# Patient Record
Sex: MALE | Race: WHITE | Employment: OTHER | URBAN - METROPOLITAN AREA
[De-identification: names, ages, dates, MRNs, and addresses within clinical notes are randomized per-mention and may not be internally consistent; named-entity substitution may affect disease eponyms.]

---

## 2017-04-04 ENCOUNTER — OFFICE VISIT (OUTPATIENT)
Dept: UROLOGY | Age: 67
End: 2017-04-04

## 2017-04-04 VITALS
WEIGHT: 295 LBS | HEIGHT: 71 IN | HEART RATE: 87 BPM | BODY MASS INDEX: 41.3 KG/M2 | SYSTOLIC BLOOD PRESSURE: 130 MMHG | DIASTOLIC BLOOD PRESSURE: 82 MMHG

## 2017-04-04 DIAGNOSIS — R35.0 FREQUENCY OF MICTURITION: ICD-10-CM

## 2017-04-04 DIAGNOSIS — R32 INCONTINENCE: Primary | ICD-10-CM

## 2017-04-04 LAB
BILIRUBIN, POC: NORMAL
BLOOD URINE, POC: NORMAL
CLARITY, POC: CLEAR
COLOR, POC: YELLOW
GLUCOSE URINE, POC: 1000
KETONES, POC: NORMAL
LEUKOCYTE EST, POC: NORMAL
NITRITE, POC: NORMAL
PH, POC: 6
PROTEIN, POC: 100
SPECIFIC GRAVITY, POC: 1.02
UROBILINOGEN, POC: 0.2

## 2017-04-04 PROCEDURE — 99214 OFFICE O/P EST MOD 30 MIN: CPT | Performed by: UROLOGY

## 2017-04-04 PROCEDURE — 81003 URINALYSIS AUTO W/O SCOPE: CPT | Performed by: UROLOGY

## 2017-04-04 ASSESSMENT — ENCOUNTER SYMPTOMS: SHORTNESS OF BREATH: 0

## 2017-04-17 ENCOUNTER — TELEPHONE (OUTPATIENT)
Dept: UROLOGY | Age: 67
End: 2017-04-17

## 2017-04-19 ENCOUNTER — TELEPHONE (OUTPATIENT)
Dept: UROLOGY | Age: 67
End: 2017-04-19

## 2017-04-19 DIAGNOSIS — N40.0 BENIGN NON-NODULAR PROSTATIC HYPERPLASIA WITHOUT LOWER URINARY TRACT SYMPTOMS: Primary | ICD-10-CM

## 2017-05-22 DIAGNOSIS — N40.0 BENIGN NON-NODULAR PROSTATIC HYPERPLASIA WITHOUT LOWER URINARY TRACT SYMPTOMS: ICD-10-CM

## 2017-05-22 LAB — PROSTATE SPECIFIC ANTIGEN: 3.01 NG/ML (ref 0–5.4)

## 2017-06-01 ENCOUNTER — OFFICE VISIT (OUTPATIENT)
Dept: UROLOGY | Age: 67
End: 2017-06-01

## 2017-06-01 VITALS
SYSTOLIC BLOOD PRESSURE: 134 MMHG | HEART RATE: 76 BPM | BODY MASS INDEX: 42 KG/M2 | WEIGHT: 300 LBS | HEIGHT: 71 IN | DIASTOLIC BLOOD PRESSURE: 82 MMHG

## 2017-06-01 DIAGNOSIS — N40.1 BPH WITH OBSTRUCTION/LOWER URINARY TRACT SYMPTOMS: Primary | ICD-10-CM

## 2017-06-01 DIAGNOSIS — N13.8 BPH WITH OBSTRUCTION/LOWER URINARY TRACT SYMPTOMS: Primary | ICD-10-CM

## 2017-06-01 PROCEDURE — 51798 US URINE CAPACITY MEASURE: CPT | Performed by: UROLOGY

## 2017-06-01 PROCEDURE — 99214 OFFICE O/P EST MOD 30 MIN: CPT | Performed by: UROLOGY

## 2017-06-01 RX ORDER — FINASTERIDE 5 MG/1
5 TABLET, FILM COATED ORAL DAILY
Qty: 90 TABLET | Refills: 3 | Status: SHIPPED | OUTPATIENT
Start: 2017-06-01

## 2017-06-01 ASSESSMENT — ENCOUNTER SYMPTOMS
ABDOMINAL PAIN: 0
SHORTNESS OF BREATH: 0

## 2019-08-12 ENCOUNTER — HOSPITAL ENCOUNTER (OUTPATIENT)
Dept: WOUND CARE | Age: 69
Discharge: HOME | End: 2019-08-12
Payer: MEDICARE

## 2019-08-12 VITALS
HEART RATE: 68 BPM | DIASTOLIC BLOOD PRESSURE: 72 MMHG | SYSTOLIC BLOOD PRESSURE: 148 MMHG | RESPIRATION RATE: 18 BRPM | TEMPERATURE: 98.3 F

## 2019-08-12 DIAGNOSIS — I83.019 VENOUS STASIS ULCER OF RIGHT LOWER LEG WITH EDEMA OF RIGHT LOWER LEG (HCC): Chronic | ICD-10-CM

## 2019-08-12 DIAGNOSIS — E66.01 MORBID OBESITY (HCC): Chronic | ICD-10-CM

## 2019-08-12 DIAGNOSIS — L97.919 VENOUS STASIS ULCER OF RIGHT LOWER LEG WITH EDEMA OF RIGHT LOWER LEG (HCC): Chronic | ICD-10-CM

## 2019-08-12 DIAGNOSIS — N18.30 STAGE 3 CHRONIC KIDNEY DISEASE (HCC): Chronic | ICD-10-CM

## 2019-08-12 DIAGNOSIS — R60.9 VENOUS STASIS ULCER OF RIGHT LOWER LEG WITH EDEMA OF RIGHT LOWER LEG (HCC): Chronic | ICD-10-CM

## 2019-08-12 DIAGNOSIS — I83.891 VENOUS STASIS ULCER OF RIGHT LOWER LEG WITH EDEMA OF RIGHT LOWER LEG (HCC): Chronic | ICD-10-CM

## 2019-08-12 PROBLEM — N18.9 CHRONIC KIDNEY DISEASE (CKD): Chronic | Status: ACTIVE | Noted: 2019-08-12

## 2019-08-12 PROBLEM — R60.0 VENOUS STASIS ULCER OF RIGHT LOWER LEG WITH EDEMA OF RIGHT LOWER LEG (HCC): Chronic | Status: ACTIVE | Noted: 2019-08-12

## 2019-08-12 PROCEDURE — 87205 SMEAR GRAM STAIN: CPT

## 2019-08-12 PROCEDURE — 87186 SC STD MICRODIL/AGAR DIL: CPT

## 2019-08-12 PROCEDURE — 87077 CULTURE AEROBIC IDENTIFY: CPT

## 2019-08-12 PROCEDURE — 87070 CULTURE OTHR SPECIMN AEROBIC: CPT

## 2019-08-12 PROCEDURE — 99213 OFFICE O/P EST LOW 20 MIN: CPT

## 2019-08-12 RX ORDER — CARVEDILOL 25 MG/1
25 TABLET ORAL 2 TIMES DAILY WITH MEALS
COMMUNITY
End: 2019-12-23

## 2019-08-12 RX ORDER — CHLORTHALIDONE 50 MG/1
50 TABLET ORAL DAILY
COMMUNITY

## 2019-08-15 LAB
GRAM STAIN RESULT: ABNORMAL
ORGANISM: ABNORMAL
ORGANISM: ABNORMAL
WOUND/ABSCESS: ABNORMAL
WOUND/ABSCESS: ABNORMAL

## 2019-08-15 RX ORDER — CLINDAMYCIN PHOSPHATE 10 MG/G
GEL TOPICAL
Qty: 30 G | Refills: 1 | Status: SHIPPED | OUTPATIENT
Start: 2019-08-15 | End: 2019-08-22

## 2019-08-19 ENCOUNTER — HOSPITAL ENCOUNTER (OUTPATIENT)
Dept: WOUND CARE | Age: 69
Discharge: HOME | End: 2019-08-19
Payer: MEDICARE

## 2019-08-19 VITALS
HEART RATE: 61 BPM | DIASTOLIC BLOOD PRESSURE: 59 MMHG | TEMPERATURE: 98.6 F | RESPIRATION RATE: 18 BRPM | SYSTOLIC BLOOD PRESSURE: 123 MMHG

## 2019-08-19 DIAGNOSIS — L97.212 NON-PRESSURE CHRONIC ULCER OF RIGHT CALF WITH FAT LAYER EXPOSED (HCC): ICD-10-CM

## 2019-08-19 PROCEDURE — 11042 DBRDMT SUBQ TIS 1ST 20SQCM/<: CPT

## 2019-09-05 ENCOUNTER — TELEPHONE (OUTPATIENT)
Dept: ADMISSION | Age: 69
End: 2019-09-05

## 2019-09-06 ENCOUNTER — TELEPHONE (OUTPATIENT)
Dept: WOUND CARE | Age: 69
End: 2019-09-06

## 2019-12-23 ENCOUNTER — HOSPITAL ENCOUNTER (OUTPATIENT)
Dept: WOUND CARE | Age: 69
Discharge: HOME | End: 2019-12-23
Payer: MEDICARE

## 2019-12-23 VITALS
TEMPERATURE: 98.8 F | SYSTOLIC BLOOD PRESSURE: 142 MMHG | DIASTOLIC BLOOD PRESSURE: 67 MMHG | RESPIRATION RATE: 18 BRPM | HEART RATE: 82 BPM

## 2019-12-23 PROCEDURE — 11042 DBRDMT SUBQ TIS 1ST 20SQCM/<: CPT

## 2019-12-23 PROCEDURE — 99213 OFFICE O/P EST LOW 20 MIN: CPT

## 2019-12-23 RX ORDER — OXYBUTYNIN CHLORIDE 15 MG/1
15 TABLET, EXTENDED RELEASE ORAL DAILY
COMMUNITY

## 2020-01-01 ENCOUNTER — HOSPITAL ENCOUNTER (OUTPATIENT)
Dept: ULTRASOUND IMAGING | Age: 70
Discharge: HOME | End: 2020-06-17
Payer: MEDICARE

## 2020-01-01 ENCOUNTER — HOSPITAL ENCOUNTER (OUTPATIENT)
Dept: CT IMAGING | Age: 70
Discharge: HOME | End: 2020-09-05
Payer: MEDICARE

## 2020-01-01 ENCOUNTER — OFFICE VISIT (OUTPATIENT)
Dept: CARDIOLOGY CLINIC | Age: 70
End: 2020-01-01
Payer: MEDICARE

## 2020-01-01 ENCOUNTER — INITIAL CONSULT (OUTPATIENT)
Dept: INTERVENTIONAL RADIOLOGY/VASCULAR | Age: 70
End: 2020-01-01
Payer: MEDICARE

## 2020-01-01 ENCOUNTER — OFFICE VISIT (OUTPATIENT)
Dept: INTERVENTIONAL RADIOLOGY/VASCULAR | Age: 70
End: 2020-01-01
Payer: MEDICARE

## 2020-01-01 ENCOUNTER — TELEPHONE (OUTPATIENT)
Dept: INTERVENTIONAL RADIOLOGY/VASCULAR | Age: 70
End: 2020-01-01

## 2020-01-01 ENCOUNTER — HOSPITAL ENCOUNTER (OUTPATIENT)
Dept: ULTRASOUND IMAGING | Age: 70
Discharge: HOME | End: 2020-11-05
Payer: MEDICARE

## 2020-01-01 VITALS
SYSTOLIC BLOOD PRESSURE: 172 MMHG | HEART RATE: 77 BPM | WEIGHT: 315 LBS | DIASTOLIC BLOOD PRESSURE: 87 MMHG | BODY MASS INDEX: 45.75 KG/M2

## 2020-01-01 VITALS
WEIGHT: 315 LBS | OXYGEN SATURATION: 96 % | SYSTOLIC BLOOD PRESSURE: 152 MMHG | BODY MASS INDEX: 44.1 KG/M2 | HEART RATE: 71 BPM | DIASTOLIC BLOOD PRESSURE: 77 MMHG | HEIGHT: 71 IN | RESPIRATION RATE: 18 BRPM

## 2020-01-01 DIAGNOSIS — E78.5 DYSLIPIDEMIA: ICD-10-CM

## 2020-01-01 DIAGNOSIS — L97.902: ICD-10-CM

## 2020-01-01 DIAGNOSIS — I10 ESSENTIAL HYPERTENSION: ICD-10-CM

## 2020-01-01 LAB
ALBUMIN SERPL-MCNC: 3.7 G/DL (ref 3.5–4.6)
ALP BLD-CCNC: 68 U/L (ref 35–104)
ALT SERPL-CCNC: 22 U/L (ref 0–41)
ANION GAP SERPL CALCULATED.3IONS-SCNC: 9 MEQ/L (ref 9–15)
AST SERPL-CCNC: 21 U/L (ref 0–40)
BILIRUB SERPL-MCNC: 0.3 MG/DL (ref 0.2–0.7)
BUN BLDV-MCNC: 25 MG/DL (ref 8–23)
CALCIUM SERPL-MCNC: 9.1 MG/DL (ref 8.5–9.9)
CHLORIDE BLD-SCNC: 99 MEQ/L (ref 95–107)
CHOLESTEROL, FASTING: 117 MG/DL (ref 0–199)
CO2: 32 MEQ/L (ref 20–31)
CREAT SERPL-MCNC: 1.32 MG/DL (ref 0.7–1.2)
GFR AFRICAN AMERICAN: >60
GFR NON-AFRICAN AMERICAN: 53.3
GLOBULIN: 2.8 G/DL (ref 2.3–3.5)
GLUCOSE BLD-MCNC: 140 MG/DL (ref 70–99)
GRAM STAIN RESULT: ABNORMAL
GRAM STAIN RESULT: ABNORMAL
HCT VFR BLD CALC: 42.1 % (ref 42–52)
HDLC SERPL-MCNC: 27 MG/DL (ref 40–59)
HEMOGLOBIN: 13.4 G/DL (ref 14–18)
LDL CHOLESTEROL CALCULATED: 55 MG/DL (ref 0–129)
MAGNESIUM: 1.7 MG/DL (ref 1.7–2.4)
MCH RBC QN AUTO: 26.7 PG (ref 27–31.3)
MCHC RBC AUTO-ENTMCNC: 31.8 % (ref 33–37)
MCV RBC AUTO: 84 FL (ref 80–100)
ORGANISM: ABNORMAL
PDW BLD-RTO: 16.3 % (ref 11.5–14.5)
PLATELET # BLD: 291 K/UL (ref 130–400)
POTASSIUM SERPL-SCNC: 4.1 MEQ/L (ref 3.4–4.9)
RBC # BLD: 5.02 M/UL (ref 4.7–6.1)
SODIUM BLD-SCNC: 140 MEQ/L (ref 135–144)
TOTAL PROTEIN: 6.5 G/DL (ref 6.3–8)
TRIGLYCERIDE, FASTING: 177 MG/DL (ref 0–150)
TSH SERPL DL<=0.05 MIU/L-ACNC: 3.42 UIU/ML (ref 0.44–3.86)
WBC # BLD: 10.1 K/UL (ref 4.8–10.8)
WOUND/ABSCESS: ABNORMAL

## 2020-01-01 PROCEDURE — 99215 OFFICE O/P EST HI 40 MIN: CPT | Performed by: NURSE PRACTITIONER

## 2020-01-01 PROCEDURE — 76642 ULTRASOUND BREAST LIMITED: CPT

## 2020-01-01 PROCEDURE — 93000 ELECTROCARDIOGRAM COMPLETE: CPT | Performed by: INTERNAL MEDICINE

## 2020-01-01 PROCEDURE — 71250 CT THORAX DX C-: CPT

## 2020-01-01 PROCEDURE — 99205 OFFICE O/P NEW HI 60 MIN: CPT | Performed by: NURSE PRACTITIONER

## 2020-01-01 PROCEDURE — 93971 EXTREMITY STUDY: CPT

## 2020-01-01 PROCEDURE — 99205 OFFICE O/P NEW HI 60 MIN: CPT | Performed by: INTERNAL MEDICINE

## 2020-01-01 PROCEDURE — 74176 CT ABD & PELVIS W/O CONTRAST: CPT

## 2020-01-01 PROCEDURE — 93971 EXTREMITY STUDY: CPT | Performed by: RADIOLOGY

## 2020-01-01 RX ORDER — ROSUVASTATIN CALCIUM 10 MG/1
10 TABLET, COATED ORAL DAILY
COMMUNITY

## 2020-01-01 RX ORDER — VALSARTAN 320 MG/1
320 TABLET ORAL DAILY
COMMUNITY

## 2020-01-01 RX ORDER — CARVEDILOL 25 MG/1
25 TABLET ORAL 2 TIMES DAILY WITH MEALS
COMMUNITY

## 2020-01-01 RX ORDER — AMLODIPINE BESYLATE 5 MG/1
5 TABLET ORAL DAILY
COMMUNITY

## 2020-01-01 RX ORDER — CEPHALEXIN 500 MG/1
500 CAPSULE ORAL 4 TIMES DAILY
Qty: 40 CAPSULE | Refills: 0 | Status: SHIPPED | OUTPATIENT
Start: 2020-01-01 | End: 2020-01-01

## 2020-01-01 ASSESSMENT — ENCOUNTER SYMPTOMS
WHEEZING: 0
CONSTIPATION: 0
EYE DISCHARGE: 0
NAUSEA: 0
COUGH: 1
EYE ITCHING: 0
RESPIRATORY NEGATIVE: 1
EYE REDNESS: 0
SINUS PRESSURE: 0
CONSTIPATION: 0
TROUBLE SWALLOWING: 0
SINUS PAIN: 0
ABDOMINAL DISTENTION: 0
SORE THROAT: 0
VISUAL CHANGE: 0
COUGH: 0
ABDOMINAL PAIN: 0
STRIDOR: 0
ABDOMINAL DISTENTION: 0
WHEEZING: 0
NAUSEA: 0
EYE DISCHARGE: 0
SHORTNESS OF BREATH: 1
BACK PAIN: 0
EYE ITCHING: 0
EYE PAIN: 0
DIARRHEA: 0
EYE PAIN: 0
BACK PAIN: 0
EYE REDNESS: 0
SORE THROAT: 0
SINUS PAIN: 0
CHEST TIGHTNESS: 0
ABDOMINAL PAIN: 0
COLOR CHANGE: 1
TROUBLE SWALLOWING: 0
BLOOD IN STOOL: 0
GASTROINTESTINAL NEGATIVE: 1
WHEEZING: 0
COLOR CHANGE: 1
SHORTNESS OF BREATH: 1
NAUSEA: 0
VOMITING: 0
SHORTNESS OF BREATH: 1
SINUS PRESSURE: 0
COUGH: 1
DIARRHEA: 0
EYES NEGATIVE: 1
VOMITING: 0
COLOR CHANGE: 1

## 2020-05-27 ENCOUNTER — TELEPHONE (OUTPATIENT)
Dept: WOUND CARE | Age: 70
End: 2020-05-27

## 2020-06-11 ENCOUNTER — OFFICE VISIT (OUTPATIENT)
Dept: ENDOCRINOLOGY | Age: 70
End: 2020-06-11
Payer: MEDICARE

## 2020-06-11 VITALS
BODY MASS INDEX: 44.1 KG/M2 | SYSTOLIC BLOOD PRESSURE: 149 MMHG | DIASTOLIC BLOOD PRESSURE: 86 MMHG | HEIGHT: 71 IN | OXYGEN SATURATION: 94 % | HEART RATE: 74 BPM | WEIGHT: 315 LBS

## 2020-06-11 PROCEDURE — 99203 OFFICE O/P NEW LOW 30 MIN: CPT | Performed by: INTERNAL MEDICINE

## 2020-06-11 RX ORDER — FLASH GLUCOSE SENSOR
KIT MISCELLANEOUS
Qty: 2 EACH | Refills: 6 | Status: SHIPPED | OUTPATIENT
Start: 2020-06-11

## 2020-06-11 RX ORDER — INSULIN GLARGINE 100 [IU]/ML
INJECTION, SOLUTION SUBCUTANEOUS
Qty: 5 PEN | Refills: 3
Start: 2020-06-11

## 2020-06-11 RX ORDER — FLASH GLUCOSE SCANNING READER
EACH MISCELLANEOUS
Qty: 1 DEVICE | Refills: 0 | Status: SHIPPED | OUTPATIENT
Start: 2020-06-11

## 2020-06-11 RX ORDER — INSULIN ASPART 100 [IU]/ML
INJECTION, SOLUTION INTRAVENOUS; SUBCUTANEOUS
Qty: 15 PEN | Refills: 3
Start: 2020-06-11

## 2020-06-11 NOTE — PROGRESS NOTES
Subjective:      Patient ID: Blanquita Starr is a 68 y.o. male. Patient   self referred here for type 2 diabetes currently is on metformin Lantus and NovoLog blood sugars have been in the 9-704 range complications diabetes include chronic kidney disease patient has been mostly being followed up at the [facility] medical facility was to take her insulin pens most current hbaic 8.9 has ckd stage 3   Diabetes   He presents for his initial diabetic visit. He has type 2 diabetes mellitus. There are no hypoglycemic associated symptoms. Associated symptoms include fatigue. Pertinent negatives for diabetes include no chest pain, no polydipsia, no polyuria, no visual change and no weight loss. Symptoms are worsening. Diabetic complications include nephropathy. Current diabetic treatment includes insulin injections. He is currently taking insulin pre-breakfast, pre-lunch, pre-dinner and at bedtime. His overall blood glucose range is >200 mg/dl.  (Glucose in 200 plus range )     Patient Active Problem List   Diagnosis    Venous stasis ulcer of right lower leg with edema of right lower leg ([address]    Uncontrolled diabetes mellitus with complication, without long-term current use of insulin (HCC)    Morbid obesity (HCC)    Chronic kidney disease (CKD)    Non-pressure 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 layer exposed ([address]     Social History     Socioeconomic History    Marital status:      Spouse name: Not on file    Number of children: Not on file    Years of education: Not on file    Highest education level: Not on file   Occupational History    Not on file   Social Needs    Financial resource strain: Not on file    Food insecurity     Worry: Not on file     Inability: Not on file    Transportation needs     Medical: Not on file     Non-medical: Not on file   Tobacco Use    Smoking status: Former Smoker    Smokeless tobacco: Never Used   Substance and Sexual Activity    Alcohol use: Not Currently    Drug use: 100 UNIT/ML SUSANNAH Morales MD

## 2020-11-03 PROBLEM — Z91.148 NONCOMPLIANCE WITH MEDICATION REGIMEN: Status: ACTIVE | Noted: 2020-01-01

## 2020-11-03 PROBLEM — I89.0 LYMPHEDEMA OF BOTH LOWER EXTREMITIES: Status: ACTIVE | Noted: 2020-01-01

## 2020-11-03 PROBLEM — Z91.14 NONCOMPLIANCE WITH MEDICATION REGIMEN: Status: ACTIVE | Noted: 2020-01-01

## 2020-11-03 NOTE — PROGRESS NOTES
Freddy Zurita, a male of 68 y.o. came to the office 11/3/2020. Chief Complaint   Patient presents with    Leg Swelling     pt referred from dr Coco Rogers for bilateral lymphedema       11/3/2020 HPI: Freddy Zurita presents to clinic for consultation at the request of his endocrinologist Dr. Brianne Berrios for evaluation of bilateral LE Lymphedema. Patient was referred back in June 2020 however states he has not presented to clinic until today due to cyclic depression. States lower extremity symptoms have worsened since June. Patient presents with symptoms of: bilateral legs with chronic edema, H/O non healing bilateral lower leg ulcers, aching, pain, burning sensation, sharp stabbing pain in right lower leg, fatigue, heaviness. Symptoms are constant and become worse at times. Causes him to have adequate nights sleep. States all pain is in right lower leg. Denies Left leg pain. Considerable right lower leg edema and severe tenderness with palpation to right lower leg. States fell a few weeks ago and now has non healing right tibial non healing wound. Chronic non healing wound to right posterior distal calf x 1 month. Chronic ulcer to left anteriomedial distal calf scabbed over and slow to heal with occurrence x 1 month. H/O wound care to RLE ulcer. NSAIDS: Tolerates pain without medication. Leg elevation: does not elevate due to causing discomfort. Stocking use and dates: Has never worn compression stockings for management of edema. PAD risk factors: HTN, HLD, obesity, former smoker, IDDM. Intermittent claudication  Shortness with any exertion. Does not follow with cardiology. Is mostly sedentary lifestyle however does attempt to incorporate some type of daily activity. H/O chronic dry cough. Diabetic neuropathy  Uncontrolled diabetes as patient admits to noncompliance with diabetic diet.       Family History   Problem Relation Age of Onset    Prostate Cancer Father     Cancer Sister        Past Surgical History:   Procedure Laterality Date    CYSTOSCOPY  04/19/2017    FLEXIBLE W/COMPLEX CYSTOMETROGRAMEMG ANAL-COMPLEX UROFLOW-US PROSTATE    SHOULDER SURGERY      WRIST FUSION          Past Medical History:   Diagnosis Date    Hyperlipidemia     Type II or unspecified type diabetes mellitus without mention of complication, not stated as uncontrolled        Social History     Socioeconomic History    Marital status:      Spouse name: Not on file    Number of children: Not on file    Years of education: Not on file    Highest education level: Not on file   Occupational History    Not on file   Social Needs    Financial resource strain: Not on file    Food insecurity     Worry: Not on file     Inability: Not on file   [employer] needs     Medical: Not on file     Non-medical: Not on file   Tobacco Use    Smoking status: Former Smoker    Smokeless tobacco: Never Used   Substance and Sexual Activity    Alcohol use: Not Currently    Drug use: Never    Sexual activity: Not Currently   Lifestyle    Physical activity     Days per week: Not on file     Minutes per session: Not on file    Stress: Not on file   Relationships    Social connections     Talks on phone: Not on file     Gets together: Not on file     Attends Jainism service: Not on file     Active member of club or organization: Not on file     Attends meetings of clubs or organizations: Not on file     Relationship status: Not on file    Intimate partner violence     Fear of current or ex partner: Not on file     Emotionally abused: Not on file     Physically abused: Not on file     Forced sexual activity: Not on file   Other Topics Concern    Not on file   Social History Narrative    Not on file       Allergies   Allergen Reactions    Atorvastatin     Losartan     Pravastatin     Simvastatin        Current Outpatient Medications on File Prior to Visit   Medication Sig Dispense Refill    insulin glargine (LANTUS SOLOSTAR) 100 UNIT/ML injection pen 60 units twice a day 5 pen 3    insulin aspart (NOVOLOG FLEXPEN) 100 UNIT/ML injection pen 40 units am 35 units lunch and 50 units 15 pen 3    Continuous Blood Gluc  (FREESTYLE MARYANN 14 DAY READER) NIRU As directed 1 Device 0    Continuous Blood Gluc Sensor (FREESTYLE MARYANN 14 DAY SENSOR) [facility] Every 2 weeks 2 each 06    oxybutynin (DITROPAN XL) 15 MG extended release tablet Take 15 mg by mouth daily      chlorthalidone (HYGROTEN) 50 MG tablet Take 50 mg by mouth daily      finasteride (PROSCAR) 5 MG tablet Take 1 tablet by mouth daily 90 tablet 3    alfuzosin (UROXATRAL) 10 MG SR tablet Take 1 tablet by mouth daily. 90 tablet 3    finasteride (PROSCAR) 5 MG tablet Take 1 tablet by mouth daily. 90 tablet 3    omeprazole (PRILOSEC) 20 MG capsule Take 20 mg by mouth daily.  rosuvastatin (CRESTOR) 20 MG tablet Take 20 mg by mouth daily.  aspirin 81 MG tablet Take 81 mg by mouth daily.  citalopram (CELEXA) 20 MG tablet Take 20 mg by mouth daily.  Aliskiren-Valsartan (VALTURNA) 300-320 MG TABS Take  by mouth.  amlodipine-benazepril (LOTREL) 5-20 MG per capsule Take 1 capsule by mouth daily. No current facility-administered medications on file prior to visit. Review of Systems   Constitutional: Negative for appetite change, chills, fatigue and fever. HENT: Negative for congestion, sinus pressure, sinus pain, sore throat and trouble swallowing. Eyes: Negative for pain, discharge, redness and itching. Respiratory: Positive for cough (chronic dry cough) and shortness of breath (chronic with exertion. ). Negative for wheezing. Cardiovascular: Positive for leg swelling (chronic bilateral lower leg edema wiht RLE .> LLE). Negative for chest pain and palpitations. Intermittent lower extremity claudication   Gastrointestinal: Negative for abdominal distention, abdominal pain, constipation, diarrhea, nausea and vomiting. Endocrine: Negative. Genitourinary: Negative for dysuria, frequency, hematuria and urgency. Musculoskeletal: Negative for back pain, neck pain and neck stiffness. Skin: Positive for color change (skin darkening to bilateral lower legs. ) and wound (chronic lower legs with RT > Lt). Neurological: Positive for numbness (feet and lower legs). Negative for dizziness, light-headedness and headaches. Hematological: Negative. Psychiatric/Behavioral: Negative. OBJECTIVE:  BP (!) 172/87   Pulse 77   Wt (!) 328 lb (148.8 kg)   BMI 45.75 kg/m²     Physical Exam  Constitutional:       General: He is not in acute distress. Appearance: Normal appearance. He is morbidly obese. He is not ill-appearing. HENT:      Head: Normocephalic and atraumatic. Nose: Nose normal. No congestion. Neck:      Musculoskeletal: Normal range of motion. Cardiovascular:      Rate and Rhythm: Normal rate and regular rhythm. Pulses:           Dorsalis pedis pulses are detected w/ Doppler on the right side and detected w/ Doppler on the left side. Posterior tibial pulses are detected w/ Doppler on the right side and detected w/ Doppler on the left side. Heart sounds: Normal heart sounds. Comments: Faint Doppled pulses to right lower leg. Left leg Doppler pulses more audible than RLE  Pulmonary:      Effort: Pulmonary effort is normal.      Breath sounds: Examination of the right-lower field reveals decreased breath sounds. Examination of the left-lower field reveals decreased breath sounds. Decreased breath sounds present. Abdominal:      General: Bowel sounds are normal. There is no distension. Musculoskeletal:         General: Tenderness (Tenderness to right lower leg lesion areas) and signs of injury (Right lateral lower leg now with ulcer formation) present. No swelling. Right lower le+ Pitting Edema present. Left lower le+ Pitting Edema present.    Skin: cultures of RLE ulcers today in office and future vascular testing, and education of disease processes. Thank You Dr. Pipe Bryson for referral of your patient to our practice for care.      WENDY Murphy - CNP

## 2020-11-12 PROBLEM — R06.02 SHORTNESS OF BREATH: Status: ACTIVE | Noted: 2020-01-01

## 2020-11-12 PROBLEM — E78.5 DYSLIPIDEMIA: Status: ACTIVE | Noted: 2020-01-01

## 2020-11-12 PROBLEM — R09.89 BILATERAL CAROTID BRUITS: Status: ACTIVE | Noted: 2020-01-01

## 2020-11-12 PROBLEM — I70.213 ATHEROSCLEROSIS OF NATIVE ARTERY OF BOTH LOWER EXTREMITIES WITH INTERMITTENT CLAUDICATION (HCC): Status: ACTIVE | Noted: 2020-01-01

## 2020-11-12 PROBLEM — I87.2 EDEMA OF RIGHT LOWER LEG DUE TO PERIPHERAL VENOUS INSUFFICIENCY: Status: ACTIVE | Noted: 2020-01-01

## 2020-11-12 PROBLEM — R60.0 EDEMA OF RIGHT LOWER LEG DUE TO PERIPHERAL VENOUS INSUFFICIENCY: Status: ACTIVE | Noted: 2020-01-01

## 2020-11-12 PROBLEM — I70.213 ATHEROSCLEROSIS OF NATIVE ARTERY OF BOTH LOWER EXTREMITIES WITH INTERMITTENT CLAUDICATION (HCC): Status: RESOLVED | Noted: 2020-01-01 | Resolved: 2020-01-01

## 2020-11-12 NOTE — PROGRESS NOTES
Td Quiroz, a male of 68 y.o. came to the office 11/12/2020. No chief complaint on file. SUBJECTIVE:     11/12/2020: Td Quiroz returns for LE arterial US and to complete RLE venous scan as this was not completed previously due to open draining infected wounds. No change to LE symtpoms. Has 5 days left of 10 day ATB  RLE wounds improving and healing with less noted edema to lower leg and no pain. No erythema. He is going to [facility] wound center in [facility] with second follow up appt next week. States he also sees a podiatrist at [facility] he thinks for possibly same treatment. He wants to stay with [facility] care for insurance purposes however he is not always pleased with the care provided so he also goes for health care outside of [facility] on occasion. This causes a breach in his continuity of care. Had consultation with [facility] Dr. Margo Morales with cardiac diagnostic testing ordered. Follows with [facility] Dr. Josias Ballard for IDDM. Has no follow up appt. 11/3/2020 HPI: Td Quiroz presents to clinic for consultation at the request of his endocrinologist Dr. Josias Ballard for evaluation of bilateral LE Lymphedema. Patient was referred back in June 2020 however states he has not presented to clinic until today due to cyclic depression. States lower extremity symptoms have worsened since June. Patient presents with symptoms of: bilateral legs with chronic edema, H/O non healing bilateral lower leg ulcers, aching, pain, burning sensation, sharp stabbing pain in right lower leg, fatigue, heaviness. Symptoms are constant and become worse at times. Causes him to have adequate nights sleep. States all pain is in right lower leg. Denies Left leg pain. Considerable right lower leg edema and severe tenderness with palpation to right lower leg. States fell a few weeks ago and now has non healing right tibial non healing wound. Chronic non healing wound to right posterior distal calf x 1 month.  Chronic ulcer to left anteriomedial distal calf scabbed over and slow to heal with occurrence x 1 month. H/O wound care to RLE ulcer. NSAIDS: Tolerates pain without medication. Leg elevation: does not elevate due to causing discomfort. Stocking use and dates: Has never worn compression stockings for management of edema. PAD risk factors: HTN, HLD, obesity, former smoker, IDDM. Intermittent claudication  Shortness with any exertion. Does not follow with cardiology. Is mostly sedentary lifestyle however does attempt to incorporate some type of daily activity. H/O chronic dry cough. Diabetic neuropathy  Uncontrolled diabetes as patient admits to noncompliance with diabetic diet.       Family History   Problem Relation Age of Onset    Prostate Cancer Father     Cancer Sister        Past Surgical History:   Procedure Laterality Date    CYSTOSCOPY  04/19/2017    FLEXIBLE W/COMPLEX CYSTOMETROGRAMEMG ANAL-COMPLEX UROFLOW-US PROSTATE    SHOULDER SURGERY      WRIST FUSION          Past Medical History:   Diagnosis Date    Hyperlipidemia     Type II or unspecified type diabetes mellitus without mention of complication, not stated as uncontrolled        Social History     Socioeconomic History    Marital status:      Spouse name: Not on file    Number of children: Not on file    Years of education: Not on file    Highest education level: Not on file   Occupational History    Not on file   Social Needs    Financial resource strain: Not on file    Food insecurity     Worry: Not on file     Inability: Not on file   [employer] needs     Medical: Not on file     Non-medical: Not on file   Tobacco Use    Smoking status: Former Smoker    Smokeless tobacco: Never Used   Substance and Sexual Activity    Alcohol use: Not Currently    Drug use: Never    Sexual activity: Not Currently   Lifestyle    Physical activity     Days per week: Not on file     Minutes per session: Not on file    Stress: Not on file   Relationships    Social connections     Talks on phone: Not on file     Gets together: Not on file     Attends Amish service: Not on file     Active member of club or organization: Not on file     Attends meetings of clubs or organizations: Not on file     Relationship status: Not on file    Intimate partner violence     Fear of current or ex partner: Not on file     Emotionally abused: Not on file     Physically abused: Not on file     Forced sexual activity: Not on file   Other Topics Concern    Not on file   Social History Narrative    Not on file       Allergies   Allergen Reactions    Atorvastatin     Losartan     Pravastatin     Simvastatin        Current Outpatient Medications on File Prior to Visit   Medication Sig Dispense Refill    valsartan (DIOVAN) 320 MG tablet Take 320 mg by mouth daily      amLODIPine (NORVASC) 5 MG tablet Take 5 mg by mouth daily      rosuvastatin (CRESTOR) 10 MG tablet Take 10 mg by mouth daily      Saw Palmetto, Serenoa repens, (SAW PALMETTO PO) Take by mouth      carvedilol (COREG) 25 MG tablet Take 25 mg by mouth 2 times daily (with meals)      vitamin D 25 MCG (1000 UT) CAPS Take by mouth      cephALEXin (KEFLEX) 500 MG capsule Take 1 capsule by mouth 4 times daily for 10 days 40 capsule 0    insulin glargine (LANTUS SOLOSTAR) 100 UNIT/ML injection pen 60 units twice a day 5 pen 3    insulin aspart (NOVOLOG FLEXPEN) 100 UNIT/ML injection pen 40 units am 35 units lunch and 50 units (Patient taking differently: 50 units AM, 40 units lunch, 50 units dinner) 15 pen 3    Continuous Blood Gluc  (FREESTYLE MARYANN 14 DAY READER) NIRU As directed 1 Device 0    Continuous Blood Gluc Sensor (FREESTYLE MARYANN 14 DAY SENSOR) [facility] Every 2 weeks 2 each 06    oxybutynin (DITROPAN XL) 15 MG extended release tablet Take 15 mg by mouth daily      chlorthalidone (HYGROTEN) 50 MG tablet Take 50 mg by mouth daily      finasteride (PROSCAR) 5 MG tablet Take 1 tablet by mouth daily 90 tablet 3    finasteride (PROSCAR) 5 MG tablet Take 1 tablet by mouth daily. 90 tablet 3    omeprazole (PRILOSEC) 20 MG capsule Take 20 mg by mouth daily.  aspirin 81 MG tablet Take 81 mg by mouth daily. No current facility-administered medications on file prior to visit. Review of Systems   Constitutional: Negative for appetite change, chills, fatigue and fever. HENT: Negative for congestion, sinus pressure, sinus pain, sore throat and trouble swallowing. Eyes: Negative for pain, discharge, redness and itching. Respiratory: Positive for cough (chronic dry cough) and shortness of breath (chronic with exertion. ). Negative for wheezing. Cardiovascular: Positive for leg swelling (chronic bilateral lower leg edema wiht RLE .> LLE). Negative for chest pain and palpitations. Gastrointestinal: Negative for abdominal distention, abdominal pain, constipation, diarrhea, nausea and vomiting. Endocrine: Negative. Genitourinary: Negative for dysuria, frequency, hematuria and urgency. Musculoskeletal: Negative for back pain, neck pain and neck stiffness. Skin: Positive for color change (skin darkening to bilateral lower legs. ) and wound (chronic lower legs with RT > Lt). Neurological: Positive for numbness (feet and lower legs). Negative for dizziness, light-headedness and headaches. Hematological: Negative. Psychiatric/Behavioral: Negative. OBJECTIVE:  There were no vitals taken for this visit. Physical Exam  Constitutional:       General: He is not in acute distress. Appearance: Normal appearance. He is morbidly obese. He is not ill-appearing. HENT:      Head: Normocephalic and atraumatic. Nose: Nose normal. No congestion. Neck:      Musculoskeletal: Normal range of motion. Cardiovascular:      Rate and Rhythm: Normal rate and regular rhythm.       Pulses:           Dorsalis pedis pulses are detected w/ Doppler on the right side and detected w/ Doppler on the left side. Posterior tibial pulses are detected w/ Doppler on the right side and detected w/ Doppler on the left side. Heart sounds: Normal heart sounds. Comments: Faint Doppled pulses to right lower leg. Left leg Doppler pulses more audible than RLE  Pulmonary:      Effort: Pulmonary effort is normal.      Breath sounds: Examination of the right-lower field reveals decreased breath sounds. Examination of the left-lower field reveals decreased breath sounds. Decreased breath sounds present. Abdominal:      General: Bowel sounds are normal. There is no distension. Musculoskeletal:         General: No swelling, tenderness or signs of injury (Right lateral lower leg now with ulcer formation). Right lower le+ Pitting Edema present. Left lower le+ Pitting Edema present. Skin:     Capillary Refill: Capillary refill takes 2 to 3 seconds. Coloration: Skin is not jaundiced or pale. Findings: Lesion (Chronic nonhealing ulcer to right posterior distal calf and right lateral distal calf both draining scan amount of serous fluid.  ) present. No bruising, erythema or rash. Neurological:      Mental Status: He is alert and oriented to person, place, and time. Psychiatric:         Mood and Affect: Mood normal.         Behavior: Behavior normal. Behavior is cooperative. ASSESSMENT AND PLAN:    Assessment:  1. Chronic bilateral lower leg edema, aching, fatigue, heaviness, non healing ulcers. LE Venous US done in office today with insufficiency to right GSV. 2. Morbid obesity  3. Symptoms consistent with Intermittent claudication with PAD risk factors: Arterial US done in office today with trace to mild LE arterial disease to lower legs. 4. Bilateral LE Stage 3 Lymphedema  5. Calf pain. Duplex negative  6. Infected RLE ulcers: placed on ATB and improving/healing   7. Diabetic peripheral Neuropathy.   8. Short of breath with minimal exertion  9. Noncompliance with diabetic regime. Plan:   1. Elevate daily as needed  2.   daily activity as tolerated  3. Rx thigh high compression stockings 20-30 mmhg wear daily during day and off Qhs. Wear as tolerated. Educated in detail venous insufficiency and lymphedema and purpose of compression. 8-week trial of wearing compression to lower extremities for initial treatment of above. Return in 8 weeks to evaluate effectiveness. At that time will discuss further treatment for lower extremity lymphedema and possible venous procedures. Chart reviewed of pertinent information. See media section for lymphedema measurements. 4.  Continue follow[facility] with [facility] cardiology. 5.  [facility]      More than 50% of 40-minute face-to-face office visit done in counseling, coordination of care for a stat RLE duplex today and cultures of RLE ulcers today in office and future vascular testing, and education of disease processes.        Sanket Liang, APRN - CNP

## 2020-11-12 NOTE — PROGRESS NOTES
NEW PATIENT        Patient: Ender Escudero  YOB: 1950  MRN: [id number]    Chief Complaint: ALEXANDER Claudication. DM   Chief Complaint   Patient presents with    Establish Cardiologist     referral Pineda Nguyen CNP    Claudication     BLE    Shortness of Breath     with any exertion    Edema     BLE pain and swelling       CV Data:    Subjective/HPI couple years dealing with RLE posterior wound from an injury when he bumped into a table. Legs hurt with walking. ALEXANDER is getting worse. He has ÓSCAR but does not use CPAP    Long standing DM with A1C >8    Nonsmoker- stopped. No etoh  Retired - outside construction.       EKG: SR 79    Past Medical History:   Diagnosis Date    Hyperlipidemia     Type II or unspecified type diabetes mellitus without mention of complication, not stated as uncontrolled        Past Surgical History:   Procedure Laterality Date    CYSTOSCOPY  04/19/2017    FLEXIBLE W/COMPLEX CYSTOMETROGRAMEMG ANAL-COMPLEX UROFLOW-US PROSTATE    SHOULDER SURGERY      WRIST FUSION         Family History   Problem Relation Age of Onset    Prostate Cancer Father     Cancer Sister        Social History     Socioeconomic History    Marital status:      Spouse name: None    Number of children: None    Years of education: None    Highest education level: None   Occupational History    None   Social Needs    Financial resource strain: None    Food insecurity     Worry: None     Inability: None    Transportation needs     Medical: None     Non-medical: None   Tobacco Use    Smoking status: Former Smoker    Smokeless tobacco: Never Used   Substance and Sexual Activity    Alcohol use: Not Currently    Drug use: Never    Sexual activity: Not Currently   Lifestyle    Physical activity     Days per week: None     Minutes per session: None    Stress: None   Relationships    Social connections     Talks on phone: None     Gets together: None     Attends [employer] service: None Active member of club or organization: None     Attends meetings of clubs or organizations: None     Relationship status: None    Intimate partner violence     Fear of current or ex partner: None     Emotionally abused: None     Physically abused: None     Forced sexual activity: None   Other Topics Concern    None   Social History Narrative    None       Allergies   Allergen Reactions    Atorvastatin     Losartan     Pravastatin     Simvastatin        Current Outpatient Medications   Medication Sig Dispense Refill    valsartan (DIOVAN) 320 MG tablet Take 320 mg by mouth daily      amLODIPine (NORVASC) 5 MG tablet Take 5 mg by mouth daily      rosuvastatin (CRESTOR) 10 MG tablet Take 10 mg by mouth daily      Saw Palmetto, Serenoa repens, (SAW PALMETTO PO) Take by mouth      carvedilol (COREG) 25 MG tablet Take 25 mg by mouth 2 times daily (with meals)      vitamin D 25 MCG (1000 UT) CAPS Take by mouth      cephALEXin (KEFLEX) 500 MG capsule Take 1 capsule by mouth 4 times daily for 10 days 40 capsule 0    insulin glargine (LANTUS SOLOSTAR) 100 UNIT/ML injection pen 60 units twice a day 5 pen 3    insulin aspart (NOVOLOG FLEXPEN) 100 UNIT/ML injection pen 40 units am 35 units lunch and 50 units (Patient taking differently: 50 units AM, 40 units lunch, 50 units dinner) 15 pen 3    Continuous Blood Gluc  (FREESTYLE MARYANN 14 DAY READER) NIRU As directed 1 Device 0    Continuous Blood Gluc Sensor (FREESTYLE MRAYANN 14 DAY SENSOR) [facility] [place] Every 2 weeks 2 each 06    oxybutynin (DITROPAN XL) 15 MG extended release tablet Take 15 mg by mouth daily      chlorthalidone (HYGROTEN) 50 MG tablet Take 50 mg by mouth daily      finasteride (PROSCAR) 5 MG tablet Take 1 tablet by mouth daily 90 tablet 3    finasteride (PROSCAR) 5 MG tablet Take 1 tablet by mouth daily. 90 tablet 3    omeprazole (PRILOSEC) 20 MG capsule Take 20 mg by mouth daily.  aspirin 81 MG tablet Take 81 mg by mouth daily. MPV  Lipids:No results found for: CHOL  No results found for: TRIG  No results found for: HDL  No results found for: LDLCHOLESTEROL, LDLCALC  No results found for: LABVLDL, VLDL  No results found for: CHOLHDLRATIO  CMP:  No results found for: NA, K, CL, CO2, BUN, CREATININE, GFRAA, AGRATIO, LABGLOM, GLUCOSE, PROT, LABALBU, CALCIUM, BILITOT, ALKPHOS, AST, ALT  BMP:  No results found for: NA, K, CL, CO2, BUN, LABALBU, CREATININE, CALCIUM, GFRAA, LABGLOM, GLUCOSE  Magnesium:  No results found for: MG  TSH:No results found for: TSHFT4, TSH          Patient Active Problem List   Diagnosis    Venous stasis ulcer of right lower leg with edema of right lower leg (HCC)    Uncontrolled diabetes mellitus with complication, without long-term current use of insulin [facility]    Morbid obesity ([facility]    Chronic kidney disease (CKD)    Non-pressure 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 layer exposed ([facility]    Lymphedema of both lower extremities    Noncompliance with medication regimen    Atherosclerosis of native artery of both lower extremities with intermittent claudication (HCC)    Shortness of breath    Bilateral carotid bruits    Dyslipidemia       Medications Discontinued During This Encounter   Medication Reason    alfuzosin (UROXATRAL) 10 MG SR tablet LIST CLEANUP    Aliskiren-Valsartan (VALTURNA) 300-320 MG TABS DISCONTINUED BY ANOTHER CLINICIAN    amlodipine-benazepril (LOTREL) 5-20 MG per capsule DISCONTINUED BY ANOTHER CLINICIAN    rosuvastatin (CRESTOR) 20 MG tablet DOSE ADJUSTMENT    citalopram (CELEXA) 20 MG tablet LIST CLEANUP       Modified Medications    No medications on file       No orders of the defined types were placed in this encounter. Assessment/Plan:    1. Atherosclerosis of native artery of both lower extremities with intermittent claudication (HCC)     - VL Arterial PVR Lower w Exercise; Future    2.  Shortness of breath     - EKG 12 Lead  - NM MYOCARDIAL SPECT REST EXERCISE OR RX; Future  - ECHO Complete 2D W Doppler W Color; Future    3. Bilateral carotid bruits     - US CAROTID ARTERY BILATERAL; Future    4. Dyslipidemia     - Lipid, Fasting; Future    5. Essential hypertension     - CBC; Future  - Magnesium; Future  - TSH without Reflex; Future  - Comprehensive Metabolic Panel; Future     6. ÓSCAR- does not use CPAP. Counseling:  Heart Healthy Lifestyle, Improve BMI, Low Salt Diet, Take Precautions to Prevent Falls and Walk Daily    Return for AFTER TESTS.     Electronically signed by Carina Moy MD on 11/12/2020 at 1:19 PM

## 2021-01-01 ENCOUNTER — APPOINTMENT (OUTPATIENT)
Dept: INTERVENTIONAL RADIOLOGY/VASCULAR | Age: 71
DRG: 870 | End: 2021-01-01
Payer: OTHER GOVERNMENT

## 2021-01-01 ENCOUNTER — APPOINTMENT (OUTPATIENT)
Dept: GENERAL RADIOLOGY | Age: 71
DRG: 870 | End: 2021-01-01
Payer: OTHER GOVERNMENT

## 2021-01-01 ENCOUNTER — APPOINTMENT (OUTPATIENT)
Dept: ULTRASOUND IMAGING | Age: 71
DRG: 870 | End: 2021-01-01
Payer: OTHER GOVERNMENT

## 2021-01-01 ENCOUNTER — APPOINTMENT (OUTPATIENT)
Dept: CT IMAGING | Age: 71
DRG: 870 | End: 2021-01-01
Payer: OTHER GOVERNMENT

## 2021-01-01 ENCOUNTER — HOSPITAL ENCOUNTER (INPATIENT)
Age: 71
LOS: 12 days | DRG: 870 | End: 2021-06-14
Attending: EMERGENCY MEDICINE
Payer: OTHER GOVERNMENT

## 2021-01-01 VITALS
WEIGHT: 315 LBS | SYSTOLIC BLOOD PRESSURE: 115 MMHG | BODY MASS INDEX: 41.75 KG/M2 | HEART RATE: 65 BPM | OXYGEN SATURATION: 89 % | DIASTOLIC BLOOD PRESSURE: 56 MMHG | TEMPERATURE: 98.9 F | HEIGHT: 73 IN | RESPIRATION RATE: 18 BRPM

## 2021-01-01 DIAGNOSIS — Z86.69 HISTORY OF SLEEP APNEA: ICD-10-CM

## 2021-01-01 DIAGNOSIS — R06.00 DYSPNEA, UNSPECIFIED TYPE: ICD-10-CM

## 2021-01-01 DIAGNOSIS — J96.02 ACUTE RESPIRATORY FAILURE WITH HYPOXIA AND HYPERCAPNIA (HCC): Primary | ICD-10-CM

## 2021-01-01 DIAGNOSIS — E16.2 HYPOGLYCEMIA: ICD-10-CM

## 2021-01-01 DIAGNOSIS — E66.01 MORBID OBESITY (HCC): ICD-10-CM

## 2021-01-01 DIAGNOSIS — I44.7 LEFT BUNDLE BRANCH BLOCK: ICD-10-CM

## 2021-01-01 DIAGNOSIS — I51.7 CARDIOMEGALY: ICD-10-CM

## 2021-01-01 DIAGNOSIS — J18.9 PNEUMONIA DUE TO ORGANISM: ICD-10-CM

## 2021-01-01 DIAGNOSIS — J96.01 ACUTE RESPIRATORY FAILURE WITH HYPOXIA AND HYPERCAPNIA (HCC): Primary | ICD-10-CM

## 2021-01-01 LAB
ALBUMIN SERPL-MCNC: 4 G/DL (ref 3.5–4.6)
ALP BLD-CCNC: 77 U/L (ref 35–104)
ALT SERPL-CCNC: 27 U/L (ref 0–41)
ANION GAP SERPL CALCULATED.3IONS-SCNC: 11 MEQ/L (ref 9–15)
ANION GAP SERPL CALCULATED.3IONS-SCNC: 12 MEQ/L (ref 9–15)
ANION GAP SERPL CALCULATED.3IONS-SCNC: 12 MEQ/L (ref 9–15)
ANION GAP SERPL CALCULATED.3IONS-SCNC: 14 MEQ/L (ref 9–15)
ANION GAP SERPL CALCULATED.3IONS-SCNC: 15 MEQ/L (ref 9–15)
ANION GAP SERPL CALCULATED.3IONS-SCNC: 16 MEQ/L (ref 9–15)
ANION GAP SERPL CALCULATED.3IONS-SCNC: 17 MEQ/L (ref 9–15)
ANION GAP SERPL CALCULATED.3IONS-SCNC: 9 MEQ/L (ref 9–15)
ANISOCYTOSIS: ABNORMAL
ANTI-XA UNFRAC HEPARIN: 0.58 IU/ML
ANTI-XA UNFRAC HEPARIN: 0.63 IU/ML
ANTI-XA UNFRAC HEPARIN: 0.76 IU/ML
APTT: 30 SEC (ref 24.4–36.8)
AST SERPL-CCNC: 24 U/L (ref 0–40)
BACTERIA: ABNORMAL /HPF
BACTERIA: NEGATIVE /HPF
BANDED NEUTROPHILS RELATIVE PERCENT: 2 %
BASE EXCESS ARTERIAL: 10 (ref -3–3)
BASE EXCESS ARTERIAL: 11 (ref -3–3)
BASE EXCESS ARTERIAL: 12 (ref -3–3)
BASE EXCESS ARTERIAL: 13 (ref -3–3)
BASE EXCESS ARTERIAL: 13 (ref -3–3)
BASE EXCESS ARTERIAL: 14 (ref -3–3)
BASE EXCESS ARTERIAL: 9 (ref -3–3)
BASE EXCESS ARTERIAL: 9 (ref -3–3)
BASOPHILS ABSOLUTE: 0 K/UL (ref 0–0.2)
BASOPHILS ABSOLUTE: 0.1 K/UL (ref 0–0.2)
BASOPHILS ABSOLUTE: 0.2 K/UL (ref 0–0.2)
BASOPHILS RELATIVE PERCENT: 0.5 %
BASOPHILS RELATIVE PERCENT: 0.6 %
BASOPHILS RELATIVE PERCENT: 0.6 %
BASOPHILS RELATIVE PERCENT: 0.7 %
BASOPHILS RELATIVE PERCENT: 0.7 %
BASOPHILS RELATIVE PERCENT: 0.8 %
BASOPHILS RELATIVE PERCENT: 1 %
BASOPHILS RELATIVE PERCENT: 1 %
BILIRUB SERPL-MCNC: 0.3 MG/DL (ref 0.2–0.7)
BILIRUBIN URINE: NEGATIVE
BILIRUBIN URINE: NEGATIVE
BLOOD CULTURE, ROUTINE: NORMAL
BLOOD, URINE: ABNORMAL
BLOOD, URINE: ABNORMAL
BUN BLDV-MCNC: 105 MG/DL (ref 8–23)
BUN BLDV-MCNC: 116 MG/DL (ref 8–23)
BUN BLDV-MCNC: 138 MG/DL (ref 8–23)
BUN BLDV-MCNC: 159 MG/DL (ref 8–23)
BUN BLDV-MCNC: 169 MG/DL (ref 8–23)
BUN BLDV-MCNC: 43 MG/DL (ref 8–23)
BUN BLDV-MCNC: 49 MG/DL (ref 8–23)
BUN BLDV-MCNC: 53 MG/DL (ref 8–23)
BUN BLDV-MCNC: 61 MG/DL (ref 8–23)
BUN BLDV-MCNC: 62 MG/DL (ref 8–23)
BUN BLDV-MCNC: 69 MG/DL (ref 8–23)
BUN BLDV-MCNC: 75 MG/DL (ref 8–23)
BUN BLDV-MCNC: 91 MG/DL (ref 8–23)
CALCIUM IONIZED: 1.01 MMOL/L (ref 1.12–1.32)
CALCIUM IONIZED: 1.02 MMOL/L (ref 1.12–1.32)
CALCIUM IONIZED: 1.04 MMOL/L (ref 1.12–1.32)
CALCIUM IONIZED: 1.05 MMOL/L (ref 1.12–1.32)
CALCIUM IONIZED: 1.06 MMOL/L (ref 1.12–1.32)
CALCIUM IONIZED: 1.08 MMOL/L (ref 1.12–1.32)
CALCIUM IONIZED: 1.11 MMOL/L (ref 1.12–1.32)
CALCIUM IONIZED: 1.13 MMOL/L (ref 1.12–1.32)
CALCIUM IONIZED: 1.21 MMOL/L (ref 1.12–1.32)
CALCIUM SERPL-MCNC: 7.7 MG/DL (ref 8.5–9.9)
CALCIUM SERPL-MCNC: 8.1 MG/DL (ref 8.5–9.9)
CALCIUM SERPL-MCNC: 8.3 MG/DL (ref 8.5–9.9)
CALCIUM SERPL-MCNC: 8.5 MG/DL (ref 8.5–9.9)
CALCIUM SERPL-MCNC: 8.6 MG/DL (ref 8.5–9.9)
CALCIUM SERPL-MCNC: 8.7 MG/DL (ref 8.5–9.9)
CALCIUM SERPL-MCNC: 8.9 MG/DL (ref 8.5–9.9)
CALCIUM SERPL-MCNC: 9 MG/DL (ref 8.5–9.9)
CALCIUM SERPL-MCNC: 9 MG/DL (ref 8.5–9.9)
CALCIUM SERPL-MCNC: 9.2 MG/DL (ref 8.5–9.9)
CALCIUM SERPL-MCNC: 9.3 MG/DL (ref 8.5–9.9)
CALCIUM SERPL-MCNC: 9.5 MG/DL (ref 8.5–9.9)
CALCIUM SERPL-MCNC: 9.6 MG/DL (ref 8.5–9.9)
CHLORIDE BLD-SCNC: 100 MEQ/L (ref 95–107)
CHLORIDE BLD-SCNC: 79 MEQ/L (ref 95–107)
CHLORIDE BLD-SCNC: 79 MEQ/L (ref 95–107)
CHLORIDE BLD-SCNC: 83 MEQ/L (ref 95–107)
CHLORIDE BLD-SCNC: 84 MEQ/L (ref 95–107)
CHLORIDE BLD-SCNC: 84 MEQ/L (ref 95–107)
CHLORIDE BLD-SCNC: 86 MEQ/L (ref 95–107)
CHLORIDE BLD-SCNC: 86 MEQ/L (ref 95–107)
CHLORIDE BLD-SCNC: 89 MEQ/L (ref 95–107)
CHLORIDE BLD-SCNC: 90 MEQ/L (ref 95–107)
CHLORIDE BLD-SCNC: 95 MEQ/L (ref 95–107)
CHLORIDE BLD-SCNC: 96 MEQ/L (ref 95–107)
CHLORIDE BLD-SCNC: 98 MEQ/L (ref 95–107)
CHOLESTEROL, TOTAL: 74 MG/DL (ref 0–199)
CLARITY: ABNORMAL
CLARITY: ABNORMAL
CO2: 32 MEQ/L (ref 20–31)
CO2: 33 MEQ/L (ref 20–31)
CO2: 34 MEQ/L (ref 20–31)
CO2: 35 MEQ/L (ref 20–31)
CO2: 37 MEQ/L (ref 20–31)
CO2: 38 MEQ/L (ref 20–31)
COLOR: ABNORMAL
COLOR: ABNORMAL
CREAT SERPL-MCNC: 1.82 MG/DL (ref 0.7–1.2)
CREAT SERPL-MCNC: 2.24 MG/DL (ref 0.7–1.2)
CREAT SERPL-MCNC: 2.47 MG/DL (ref 0.7–1.2)
CREAT SERPL-MCNC: 3.27 MG/DL (ref 0.7–1.2)
CREAT SERPL-MCNC: 3.27 MG/DL (ref 0.7–1.2)
CREAT SERPL-MCNC: 3.28 MG/DL (ref 0.7–1.2)
CREAT SERPL-MCNC: 3.31 MG/DL (ref 0.7–1.2)
CREAT SERPL-MCNC: 3.33 MG/DL (ref 0.7–1.2)
CREAT SERPL-MCNC: 3.39 MG/DL (ref 0.7–1.2)
CREAT SERPL-MCNC: 3.4 MG/DL (ref 0.7–1.2)
CREAT SERPL-MCNC: 4.33 MG/DL (ref 0.7–1.2)
CREAT SERPL-MCNC: 4.84 MG/DL (ref 0.7–1.2)
CREAT SERPL-MCNC: 4.97 MG/DL (ref 0.7–1.2)
CREATININE URINE: 244.8 MG/DL
CULTURE, BLOOD 2: NORMAL
CULTURE, RESPIRATORY: ABNORMAL
CULTURE, RESPIRATORY: NORMAL
D DIMER: 2.29 MG/L FEU (ref 0–0.5)
EKG ATRIAL RATE: 46 BPM
EKG ATRIAL RATE: 78 BPM
EKG ATRIAL RATE: 78 BPM
EKG P AXIS: 49 DEGREES
EKG P AXIS: 51 DEGREES
EKG P AXIS: 58 DEGREES
EKG P-R INTERVAL: 190 MS
EKG P-R INTERVAL: 194 MS
EKG P-R INTERVAL: 204 MS
EKG Q-T INTERVAL: 414 MS
EKG Q-T INTERVAL: 474 MS
EKG Q-T INTERVAL: 538 MS
EKG QRS DURATION: 142 MS
EKG QRS DURATION: 88 MS
EKG QRS DURATION: 94 MS
EKG QTC CALCULATION (BAZETT): 414 MS
EKG QTC CALCULATION (BAZETT): 433 MS
EKG QTC CALCULATION (BAZETT): 471 MS
EKG R AXIS: -14 DEGREES
EKG R AXIS: -4 DEGREES
EKG R AXIS: 13 DEGREES
EKG T AXIS: 116 DEGREES
EKG T AXIS: 19 DEGREES
EKG T AXIS: 31 DEGREES
EKG VENTRICULAR RATE: 39 BPM
EKG VENTRICULAR RATE: 46 BPM
EKG VENTRICULAR RATE: 78 BPM
EOSINOPHILS ABSOLUTE: 0 K/UL (ref 0–0.7)
EOSINOPHILS ABSOLUTE: 0.1 K/UL (ref 0–0.7)
EOSINOPHILS ABSOLUTE: 0.1 K/UL (ref 0–0.7)
EOSINOPHILS ABSOLUTE: 0.2 K/UL (ref 0–0.7)
EOSINOPHILS ABSOLUTE: 0.2 K/UL (ref 0–0.7)
EOSINOPHILS ABSOLUTE: 0.3 K/UL (ref 0–0.7)
EOSINOPHILS ABSOLUTE: 0.4 K/UL (ref 0–0.7)
EOSINOPHILS RELATIVE PERCENT: 0.8 %
EOSINOPHILS RELATIVE PERCENT: 1.3 %
EOSINOPHILS RELATIVE PERCENT: 1.9 %
EOSINOPHILS RELATIVE PERCENT: 1.9 %
EOSINOPHILS RELATIVE PERCENT: 2.2 %
EOSINOPHILS RELATIVE PERCENT: 2.4 %
EOSINOPHILS RELATIVE PERCENT: 2.4 %
EOSINOPHILS RELATIVE PERCENT: 2.5 %
EOSINOPHILS RELATIVE PERCENT: 2.6 %
EOSINOPHILS RELATIVE PERCENT: 3.5 %
EOSINOPHILS RELATIVE PERCENT: 3.5 %
EOSINOPHILS RELATIVE PERCENT: 3.8 %
EOSINOPHILS RELATIVE PERCENT: 4 %
EPITHELIAL CELLS, UA: ABNORMAL /HPF (ref 0–5)
EPITHELIAL CELLS, UA: ABNORMAL /HPF (ref 0–5)
GFR AFRICAN AMERICAN: 14
GFR AFRICAN AMERICAN: 14.4
GFR AFRICAN AMERICAN: 15
GFR AFRICAN AMERICAN: 16.4
GFR AFRICAN AMERICAN: 17
GFR AFRICAN AMERICAN: 17
GFR AFRICAN AMERICAN: 19
GFR AFRICAN AMERICAN: 20
GFR AFRICAN AMERICAN: 21
GFR AFRICAN AMERICAN: 21.6
GFR AFRICAN AMERICAN: 21.7
GFR AFRICAN AMERICAN: 22
GFR AFRICAN AMERICAN: 22.1
GFR AFRICAN AMERICAN: 22.3
GFR AFRICAN AMERICAN: 22.5
GFR AFRICAN AMERICAN: 22.6
GFR AFRICAN AMERICAN: 22.6
GFR AFRICAN AMERICAN: 24
GFR AFRICAN AMERICAN: 31.3
GFR AFRICAN AMERICAN: 35
GFR AFRICAN AMERICAN: 38
GFR AFRICAN AMERICAN: 38
GFR AFRICAN AMERICAN: 44.5
GFR AFRICAN AMERICAN: 48
GFR NON-AFRICAN AMERICAN: 11.5
GFR NON-AFRICAN AMERICAN: 11.9
GFR NON-AFRICAN AMERICAN: 12
GFR NON-AFRICAN AMERICAN: 13.5
GFR NON-AFRICAN AMERICAN: 14
GFR NON-AFRICAN AMERICAN: 14
GFR NON-AFRICAN AMERICAN: 16
GFR NON-AFRICAN AMERICAN: 16
GFR NON-AFRICAN AMERICAN: 17
GFR NON-AFRICAN AMERICAN: 17.9
GFR NON-AFRICAN AMERICAN: 17.9
GFR NON-AFRICAN AMERICAN: 18
GFR NON-AFRICAN AMERICAN: 18.3
GFR NON-AFRICAN AMERICAN: 18.4
GFR NON-AFRICAN AMERICAN: 18.6
GFR NON-AFRICAN AMERICAN: 18.7
GFR NON-AFRICAN AMERICAN: 18.7
GFR NON-AFRICAN AMERICAN: 20
GFR NON-AFRICAN AMERICAN: 25.8
GFR NON-AFRICAN AMERICAN: 28.9
GFR NON-AFRICAN AMERICAN: 31
GFR NON-AFRICAN AMERICAN: 31
GFR NON-AFRICAN AMERICAN: 36.8
GFR NON-AFRICAN AMERICAN: 40
GLOBULIN: 3.6 G/DL (ref 2.3–3.5)
GLUCOSE BLD-MCNC: 108 MG/DL (ref 60–115)
GLUCOSE BLD-MCNC: 117 MG/DL (ref 60–115)
GLUCOSE BLD-MCNC: 128 MG/DL (ref 60–115)
GLUCOSE BLD-MCNC: 133 MG/DL (ref 60–115)
GLUCOSE BLD-MCNC: 136 MG/DL (ref 60–115)
GLUCOSE BLD-MCNC: 138 MG/DL (ref 60–115)
GLUCOSE BLD-MCNC: 156 MG/DL (ref 70–99)
GLUCOSE BLD-MCNC: 161 MG/DL (ref 60–115)
GLUCOSE BLD-MCNC: 164 MG/DL (ref 60–115)
GLUCOSE BLD-MCNC: 189 MG/DL (ref 60–115)
GLUCOSE BLD-MCNC: 194 MG/DL (ref 70–99)
GLUCOSE BLD-MCNC: 207 MG/DL (ref 60–115)
GLUCOSE BLD-MCNC: 209 MG/DL (ref 60–115)
GLUCOSE BLD-MCNC: 212 MG/DL (ref 60–115)
GLUCOSE BLD-MCNC: 222 MG/DL (ref 60–115)
GLUCOSE BLD-MCNC: 223 MG/DL (ref 70–99)
GLUCOSE BLD-MCNC: 228 MG/DL (ref 60–115)
GLUCOSE BLD-MCNC: 229 MG/DL (ref 70–99)
GLUCOSE BLD-MCNC: 231 MG/DL (ref 60–115)
GLUCOSE BLD-MCNC: 235 MG/DL (ref 60–115)
GLUCOSE BLD-MCNC: 237 MG/DL (ref 60–115)
GLUCOSE BLD-MCNC: 238 MG/DL (ref 70–99)
GLUCOSE BLD-MCNC: 239 MG/DL (ref 70–99)
GLUCOSE BLD-MCNC: 242 MG/DL (ref 60–115)
GLUCOSE BLD-MCNC: 243 MG/DL (ref 60–115)
GLUCOSE BLD-MCNC: 249 MG/DL (ref 60–115)
GLUCOSE BLD-MCNC: 252 MG/DL (ref 60–115)
GLUCOSE BLD-MCNC: 253 MG/DL (ref 60–115)
GLUCOSE BLD-MCNC: 254 MG/DL (ref 60–115)
GLUCOSE BLD-MCNC: 255 MG/DL (ref 60–115)
GLUCOSE BLD-MCNC: 258 MG/DL (ref 70–99)
GLUCOSE BLD-MCNC: 259 MG/DL (ref 60–115)
GLUCOSE BLD-MCNC: 26 MG/DL (ref 60–115)
GLUCOSE BLD-MCNC: 262 MG/DL (ref 60–115)
GLUCOSE BLD-MCNC: 268 MG/DL (ref 60–115)
GLUCOSE BLD-MCNC: 270 MG/DL (ref 60–115)
GLUCOSE BLD-MCNC: 270 MG/DL (ref 60–115)
GLUCOSE BLD-MCNC: 272 MG/DL (ref 60–115)
GLUCOSE BLD-MCNC: 273 MG/DL (ref 70–99)
GLUCOSE BLD-MCNC: 275 MG/DL (ref 60–115)
GLUCOSE BLD-MCNC: 278 MG/DL (ref 60–115)
GLUCOSE BLD-MCNC: 279 MG/DL (ref 60–115)
GLUCOSE BLD-MCNC: 280 MG/DL (ref 60–115)
GLUCOSE BLD-MCNC: 281 MG/DL (ref 60–115)
GLUCOSE BLD-MCNC: 282 MG/DL (ref 60–115)
GLUCOSE BLD-MCNC: 283 MG/DL (ref 60–115)
GLUCOSE BLD-MCNC: 285 MG/DL (ref 60–115)
GLUCOSE BLD-MCNC: 286 MG/DL (ref 60–115)
GLUCOSE BLD-MCNC: 286 MG/DL (ref 60–115)
GLUCOSE BLD-MCNC: 287 MG/DL (ref 70–99)
GLUCOSE BLD-MCNC: 289 MG/DL (ref 60–115)
GLUCOSE BLD-MCNC: 289 MG/DL (ref 60–115)
GLUCOSE BLD-MCNC: 290 MG/DL (ref 60–115)
GLUCOSE BLD-MCNC: 290 MG/DL (ref 60–115)
GLUCOSE BLD-MCNC: 291 MG/DL (ref 60–115)
GLUCOSE BLD-MCNC: 292 MG/DL (ref 60–115)
GLUCOSE BLD-MCNC: 294 MG/DL (ref 60–115)
GLUCOSE BLD-MCNC: 295 MG/DL (ref 60–115)
GLUCOSE BLD-MCNC: 295 MG/DL (ref 60–115)
GLUCOSE BLD-MCNC: 296 MG/DL (ref 60–115)
GLUCOSE BLD-MCNC: 300 MG/DL (ref 60–115)
GLUCOSE BLD-MCNC: 303 MG/DL (ref 60–115)
GLUCOSE BLD-MCNC: 307 MG/DL (ref 60–115)
GLUCOSE BLD-MCNC: 308 MG/DL (ref 70–99)
GLUCOSE BLD-MCNC: 309 MG/DL (ref 60–115)
GLUCOSE BLD-MCNC: 309 MG/DL (ref 60–115)
GLUCOSE BLD-MCNC: 311 MG/DL (ref 60–115)
GLUCOSE BLD-MCNC: 320 MG/DL (ref 60–115)
GLUCOSE BLD-MCNC: 320 MG/DL (ref 60–115)
GLUCOSE BLD-MCNC: 325 MG/DL (ref 60–115)
GLUCOSE BLD-MCNC: 326 MG/DL (ref 60–115)
GLUCOSE BLD-MCNC: 327 MG/DL (ref 60–115)
GLUCOSE BLD-MCNC: 336 MG/DL (ref 60–115)
GLUCOSE BLD-MCNC: 337 MG/DL (ref 60–115)
GLUCOSE BLD-MCNC: 352 MG/DL (ref 60–115)
GLUCOSE BLD-MCNC: 49 MG/DL (ref 60–115)
GLUCOSE BLD-MCNC: 62 MG/DL (ref 70–99)
GLUCOSE BLD-MCNC: 63 MG/DL (ref 70–99)
GLUCOSE BLD-MCNC: 69 MG/DL (ref 60–115)
GLUCOSE BLD-MCNC: 70 MG/DL (ref 60–115)
GLUCOSE BLD-MCNC: 75 MG/DL (ref 60–115)
GLUCOSE BLD-MCNC: 78 MG/DL (ref 60–115)
GLUCOSE BLD-MCNC: 78 MG/DL (ref 60–115)
GLUCOSE BLD-MCNC: 79 MG/DL (ref 60–115)
GLUCOSE BLD-MCNC: 81 MG/DL (ref 60–115)
GLUCOSE BLD-MCNC: 82 MG/DL (ref 60–115)
GLUCOSE BLD-MCNC: 85 MG/DL (ref 60–115)
GLUCOSE BLD-MCNC: 88 MG/DL (ref 60–115)
GLUCOSE BLD-MCNC: 89 MG/DL (ref 60–115)
GLUCOSE BLD-MCNC: 91 MG/DL (ref 60–115)
GLUCOSE BLD-MCNC: 99 MG/DL (ref 70–99)
GLUCOSE URINE: NEGATIVE MG/DL
GLUCOSE URINE: NEGATIVE MG/DL
GRAM STAIN RESULT: ABNORMAL
GRAM STAIN RESULT: NORMAL
HCO3 ARTERIAL: 33.4 MMOL/L (ref 21–29)
HCO3 ARTERIAL: 34.6 MMOL/L (ref 21–29)
HCO3 ARTERIAL: 34.8 MMOL/L (ref 21–29)
HCO3 ARTERIAL: 34.9 MMOL/L (ref 21–29)
HCO3 ARTERIAL: 35.2 MMOL/L (ref 21–29)
HCO3 ARTERIAL: 35.9 MMOL/L (ref 21–29)
HCO3 ARTERIAL: 36.2 MMOL/L (ref 21–29)
HCO3 ARTERIAL: 36.4 MMOL/L (ref 21–29)
HCO3 ARTERIAL: 36.9 MMOL/L (ref 21–29)
HCO3 ARTERIAL: 37.1 MMOL/L (ref 21–29)
HCO3 ARTERIAL: 37.6 MMOL/L (ref 21–29)
HCO3 ARTERIAL: 37.8 MMOL/L (ref 21–29)
HCO3 ARTERIAL: 38.5 MMOL/L (ref 21–29)
HCO3 ARTERIAL: 40 MMOL/L (ref 21–29)
HCT VFR BLD CALC: 28.9 % (ref 42–52)
HCT VFR BLD CALC: 29.6 % (ref 42–52)
HCT VFR BLD CALC: 30.2 % (ref 42–52)
HCT VFR BLD CALC: 31.6 % (ref 42–52)
HCT VFR BLD CALC: 32.8 % (ref 42–52)
HCT VFR BLD CALC: 33.3 % (ref 42–52)
HCT VFR BLD CALC: 33.5 % (ref 42–52)
HCT VFR BLD CALC: 34.3 % (ref 42–52)
HCT VFR BLD CALC: 34.4 % (ref 42–52)
HCT VFR BLD CALC: 34.5 % (ref 42–52)
HCT VFR BLD CALC: 34.7 % (ref 42–52)
HCT VFR BLD CALC: 35.4 % (ref 42–52)
HCT VFR BLD CALC: 38.4 % (ref 42–52)
HDLC SERPL-MCNC: 22 MG/DL (ref 40–59)
HEMOGLOBIN: 10.3 G/DL (ref 14–18)
HEMOGLOBIN: 10.3 G/DL (ref 14–18)
HEMOGLOBIN: 10.7 G/DL (ref 14–18)
HEMOGLOBIN: 10.7 GM/DL (ref 13.5–17.5)
HEMOGLOBIN: 10.8 G/DL (ref 14–18)
HEMOGLOBIN: 10.9 G/DL (ref 14–18)
HEMOGLOBIN: 11.4 GM/DL (ref 13.5–17.5)
HEMOGLOBIN: 11.5 GM/DL (ref 13.5–17.5)
HEMOGLOBIN: 11.7 GM/DL (ref 13.5–17.5)
HEMOGLOBIN: 11.8 G/DL (ref 14–18)
HEMOGLOBIN: 11.8 GM/DL (ref 13.5–17.5)
HEMOGLOBIN: 11.9 GM/DL (ref 13.5–17.5)
HEMOGLOBIN: 12 GM/DL (ref 13.5–17.5)
HEMOGLOBIN: 12.2 GM/DL (ref 13.5–17.5)
HEMOGLOBIN: 12.2 GM/DL (ref 13.5–17.5)
HEMOGLOBIN: 12.6 GM/DL (ref 13.5–17.5)
HEMOGLOBIN: 14.3 GM/DL (ref 13.5–17.5)
HEMOGLOBIN: 9.1 G/DL (ref 14–18)
HEMOGLOBIN: 9.3 G/DL (ref 14–18)
HEMOGLOBIN: 9.7 G/DL (ref 14–18)
HEMOGLOBIN: 9.8 G/DL (ref 14–18)
HYALINE CASTS: ABNORMAL /HPF (ref 0–5)
HYALINE CASTS: ABNORMAL /HPF (ref 0–5)
HYPOCHROMIA: ABNORMAL
HYPOCHROMIA: ABNORMAL
INR BLD: 1.1
INR BLD: 1.2
KETONES, URINE: NEGATIVE MG/DL
KETONES, URINE: NEGATIVE MG/DL
LACTATE: 0.53 MMOL/L (ref 0.4–2)
LACTATE: 0.56 MMOL/L (ref 0.4–2)
LACTATE: 0.57 MMOL/L (ref 0.4–2)
LACTATE: 0.57 MMOL/L (ref 0.4–2)
LACTATE: 0.58 MMOL/L (ref 0.4–2)
LACTATE: 0.59 MMOL/L (ref 0.4–2)
LACTATE: 0.6 MMOL/L (ref 0.4–2)
LACTATE: 0.61 MMOL/L (ref 0.4–2)
LACTATE: 0.62 MMOL/L (ref 0.4–2)
LACTATE: 0.63 MMOL/L (ref 0.4–2)
LACTATE: 0.68 MMOL/L (ref 0.4–2)
LACTATE: 0.68 MMOL/L (ref 0.4–2)
LACTATE: 0.71 MMOL/L (ref 0.4–2)
LACTATE: 0.74 MMOL/L (ref 0.4–2)
LACTIC ACID, SEPSIS: 0.9 MMOL/L (ref 0.5–1.9)
LACTIC ACID: 1.6 MMOL/L (ref 0.5–2.2)
LDL CHOLESTEROL CALCULATED: 28 MG/DL (ref 0–129)
LEUKOCYTE ESTERASE, URINE: ABNORMAL
LEUKOCYTE ESTERASE, URINE: ABNORMAL
LV EF: 55 %
LVEF MODALITY: NORMAL
LYMPHOCYTES ABSOLUTE: 0.9 K/UL (ref 1–4.8)
LYMPHOCYTES ABSOLUTE: 1 K/UL (ref 1–4.8)
LYMPHOCYTES ABSOLUTE: 1 K/UL (ref 1–4.8)
LYMPHOCYTES ABSOLUTE: 1.1 K/UL (ref 1–4.8)
LYMPHOCYTES ABSOLUTE: 1.3 K/UL (ref 1–4.8)
LYMPHOCYTES ABSOLUTE: 1.4 K/UL (ref 1–4.8)
LYMPHOCYTES ABSOLUTE: 1.5 K/UL (ref 1–4.8)
LYMPHOCYTES ABSOLUTE: 1.6 K/UL (ref 1–4.8)
LYMPHOCYTES ABSOLUTE: 1.6 K/UL (ref 1–4.8)
LYMPHOCYTES ABSOLUTE: 1.8 K/UL (ref 1–4.8)
LYMPHOCYTES ABSOLUTE: 1.9 K/UL (ref 1–4.8)
LYMPHOCYTES ABSOLUTE: 1.9 K/UL (ref 1–4.8)
LYMPHOCYTES ABSOLUTE: 2.3 K/UL (ref 1–4.8)
LYMPHOCYTES RELATIVE PERCENT: 10 %
LYMPHOCYTES RELATIVE PERCENT: 10.2 %
LYMPHOCYTES RELATIVE PERCENT: 10.8 %
LYMPHOCYTES RELATIVE PERCENT: 12.1 %
LYMPHOCYTES RELATIVE PERCENT: 14.3 %
LYMPHOCYTES RELATIVE PERCENT: 14.5 %
LYMPHOCYTES RELATIVE PERCENT: 15.3 %
LYMPHOCYTES RELATIVE PERCENT: 15.7 %
LYMPHOCYTES RELATIVE PERCENT: 18.6 %
LYMPHOCYTES RELATIVE PERCENT: 19.9 %
LYMPHOCYTES RELATIVE PERCENT: 7 %
LYMPHOCYTES RELATIVE PERCENT: 8.8 %
LYMPHOCYTES RELATIVE PERCENT: 9.4 %
MAGNESIUM: 2.1 MG/DL (ref 1.7–2.4)
MCH RBC QN AUTO: 23 PG (ref 27–31.3)
MCH RBC QN AUTO: 23.1 PG (ref 27–31.3)
MCH RBC QN AUTO: 23.3 PG (ref 27–31.3)
MCH RBC QN AUTO: 23.3 PG (ref 27–31.3)
MCH RBC QN AUTO: 23.4 PG (ref 27–31.3)
MCH RBC QN AUTO: 23.5 PG (ref 27–31.3)
MCH RBC QN AUTO: 23.6 PG (ref 27–31.3)
MCH RBC QN AUTO: 23.9 PG (ref 27–31.3)
MCH RBC QN AUTO: 24 PG (ref 27–31.3)
MCHC RBC AUTO-ENTMCNC: 30.7 % (ref 33–37)
MCHC RBC AUTO-ENTMCNC: 30.8 % (ref 33–37)
MCHC RBC AUTO-ENTMCNC: 30.9 % (ref 33–37)
MCHC RBC AUTO-ENTMCNC: 31 % (ref 33–37)
MCHC RBC AUTO-ENTMCNC: 31.3 % (ref 33–37)
MCHC RBC AUTO-ENTMCNC: 31.3 % (ref 33–37)
MCHC RBC AUTO-ENTMCNC: 31.4 % (ref 33–37)
MCHC RBC AUTO-ENTMCNC: 31.5 % (ref 33–37)
MCHC RBC AUTO-ENTMCNC: 31.6 % (ref 33–37)
MCHC RBC AUTO-ENTMCNC: 32 % (ref 33–37)
MCHC RBC AUTO-ENTMCNC: 32.1 % (ref 33–37)
MCV RBC AUTO: 73.4 FL (ref 80–100)
MCV RBC AUTO: 74.4 FL (ref 80–100)
MCV RBC AUTO: 74.5 FL (ref 80–100)
MCV RBC AUTO: 74.6 FL (ref 80–100)
MCV RBC AUTO: 74.6 FL (ref 80–100)
MCV RBC AUTO: 74.7 FL (ref 80–100)
MCV RBC AUTO: 75.1 FL (ref 80–100)
MCV RBC AUTO: 76 FL (ref 80–100)
MCV RBC AUTO: 76.2 FL (ref 80–100)
MCV RBC AUTO: 76.4 FL (ref 80–100)
MCV RBC AUTO: 76.7 FL (ref 80–100)
METAMYELOCYTES RELATIVE PERCENT: 1 %
MICROALBUMIN UR-MCNC: 81.2 MG/DL
MICROALBUMIN/CREAT UR-RTO: 331.7 MG/G (ref 0–30)
MICROCYTES: ABNORMAL
MONOCYTES ABSOLUTE: 0.3 K/UL (ref 0.2–0.8)
MONOCYTES ABSOLUTE: 0.8 K/UL (ref 0.2–0.8)
MONOCYTES ABSOLUTE: 0.9 K/UL (ref 0.2–0.8)
MONOCYTES ABSOLUTE: 1 K/UL (ref 0.2–0.8)
MONOCYTES ABSOLUTE: 1.1 K/UL (ref 0.2–0.8)
MONOCYTES ABSOLUTE: 1.1 K/UL (ref 0.2–0.8)
MONOCYTES ABSOLUTE: 1.2 K/UL (ref 0.2–0.8)
MONOCYTES ABSOLUTE: 1.2 K/UL (ref 0.2–0.8)
MONOCYTES ABSOLUTE: 1.3 K/UL (ref 0.2–0.8)
MONOCYTES ABSOLUTE: 1.4 K/UL (ref 0.2–0.8)
MONOCYTES RELATIVE PERCENT: 10.6 %
MONOCYTES RELATIVE PERCENT: 10.8 %
MONOCYTES RELATIVE PERCENT: 11.6 %
MONOCYTES RELATIVE PERCENT: 12.7 %
MONOCYTES RELATIVE PERCENT: 3 %
MONOCYTES RELATIVE PERCENT: 5.7 %
MONOCYTES RELATIVE PERCENT: 7.3 %
MONOCYTES RELATIVE PERCENT: 7.5 %
MONOCYTES RELATIVE PERCENT: 7.9 %
MONOCYTES RELATIVE PERCENT: 8.2 %
MONOCYTES RELATIVE PERCENT: 8.2 %
MONOCYTES RELATIVE PERCENT: 8.8 %
MONOCYTES RELATIVE PERCENT: 9.5 %
NEUTROPHILS ABSOLUTE: 10.1 K/UL (ref 1.4–6.5)
NEUTROPHILS ABSOLUTE: 10.2 K/UL (ref 1.4–6.5)
NEUTROPHILS ABSOLUTE: 10.3 K/UL (ref 1.4–6.5)
NEUTROPHILS ABSOLUTE: 10.8 K/UL (ref 1.4–6.5)
NEUTROPHILS ABSOLUTE: 11.3 K/UL (ref 1.4–6.5)
NEUTROPHILS ABSOLUTE: 6.6 K/UL (ref 1.4–6.5)
NEUTROPHILS ABSOLUTE: 7 K/UL (ref 1.4–6.5)
NEUTROPHILS ABSOLUTE: 7.1 K/UL (ref 1.4–6.5)
NEUTROPHILS ABSOLUTE: 7.3 K/UL (ref 1.4–6.5)
NEUTROPHILS ABSOLUTE: 7.4 K/UL (ref 1.4–6.5)
NEUTROPHILS ABSOLUTE: 8.8 K/UL (ref 1.4–6.5)
NEUTROPHILS ABSOLUTE: 9 K/UL (ref 1.4–6.5)
NEUTROPHILS ABSOLUTE: 9.2 K/UL (ref 1.4–6.5)
NEUTROPHILS RELATIVE PERCENT: 67.6 %
NEUTROPHILS RELATIVE PERCENT: 68.8 %
NEUTROPHILS RELATIVE PERCENT: 70.8 %
NEUTROPHILS RELATIVE PERCENT: 72 %
NEUTROPHILS RELATIVE PERCENT: 73.1 %
NEUTROPHILS RELATIVE PERCENT: 73.9 %
NEUTROPHILS RELATIVE PERCENT: 76.6 %
NEUTROPHILS RELATIVE PERCENT: 76.9 %
NEUTROPHILS RELATIVE PERCENT: 77.2 %
NEUTROPHILS RELATIVE PERCENT: 77.5 %
NEUTROPHILS RELATIVE PERCENT: 81.2 %
NEUTROPHILS RELATIVE PERCENT: 82 %
NEUTROPHILS RELATIVE PERCENT: 83 %
NITRITE, URINE: NEGATIVE
NITRITE, URINE: NEGATIVE
O2 SAT, ARTERIAL: 75 % (ref 93–100)
O2 SAT, ARTERIAL: 81 % (ref 93–100)
O2 SAT, ARTERIAL: 83 % (ref 93–100)
O2 SAT, ARTERIAL: 88 % (ref 93–100)
O2 SAT, ARTERIAL: 89 % (ref 93–100)
O2 SAT, ARTERIAL: 90 % (ref 93–100)
O2 SAT, ARTERIAL: 91 % (ref 93–100)
O2 SAT, ARTERIAL: 91 % (ref 93–100)
O2 SAT, ARTERIAL: 92 % (ref 93–100)
O2 SAT, ARTERIAL: 94 % (ref 93–100)
O2 SAT, ARTERIAL: 95 % (ref 93–100)
O2 SAT, ARTERIAL: 98 % (ref 93–100)
O2 SAT, ARTERIAL: 98 % (ref 93–100)
O2 SAT, ARTERIAL: 99 % (ref 93–100)
ORGANISM: ABNORMAL
PCO2 ARTERIAL: 53 MM HG (ref 35–45)
PCO2 ARTERIAL: 53 MM HG (ref 35–45)
PCO2 ARTERIAL: 54 MM HG (ref 35–45)
PCO2 ARTERIAL: 55 MM HG (ref 35–45)
PCO2 ARTERIAL: 56 MM HG (ref 35–45)
PCO2 ARTERIAL: 57 MM HG (ref 35–45)
PCO2 ARTERIAL: 63 MM HG (ref 35–45)
PCO2 ARTERIAL: 64 MM HG (ref 35–45)
PCO2 ARTERIAL: 65 MM HG (ref 35–45)
PCO2 ARTERIAL: 68 MM HG (ref 35–45)
PCO2 ARTERIAL: 69 MM HG (ref 35–45)
PCO2 ARTERIAL: 72 MM HG (ref 35–45)
PCO2 ARTERIAL: 73 MM HG (ref 35–45)
PCO2 ARTERIAL: 80 MM HG (ref 35–45)
PDW BLD-RTO: 17 % (ref 11.5–14.5)
PDW BLD-RTO: 17.1 % (ref 11.5–14.5)
PDW BLD-RTO: 17.4 % (ref 11.5–14.5)
PDW BLD-RTO: 17.4 % (ref 11.5–14.5)
PDW BLD-RTO: 17.5 % (ref 11.5–14.5)
PDW BLD-RTO: 17.6 % (ref 11.5–14.5)
PDW BLD-RTO: 17.6 % (ref 11.5–14.5)
PDW BLD-RTO: 17.7 % (ref 11.5–14.5)
PDW BLD-RTO: 17.8 % (ref 11.5–14.5)
PDW BLD-RTO: 17.9 % (ref 11.5–14.5)
PDW BLD-RTO: 18 % (ref 11.5–14.5)
PDW BLD-RTO: 18 % (ref 11.5–14.5)
PDW BLD-RTO: 18.1 % (ref 11.5–14.5)
PERFORMED ON: ABNORMAL
PERFORMED ON: NORMAL
PH ARTERIAL: 7.28 (ref 7.35–7.45)
PH ARTERIAL: 7.33 (ref 7.35–7.45)
PH ARTERIAL: 7.34 (ref 7.35–7.45)
PH ARTERIAL: 7.36 (ref 7.35–7.45)
PH ARTERIAL: 7.36 (ref 7.35–7.45)
PH ARTERIAL: 7.37 (ref 7.35–7.45)
PH ARTERIAL: 7.4 (ref 7.35–7.45)
PH ARTERIAL: 7.4 (ref 7.35–7.45)
PH ARTERIAL: 7.42 (ref 7.35–7.45)
PH ARTERIAL: 7.42 (ref 7.35–7.45)
PH ARTERIAL: 7.43 (ref 7.35–7.45)
PH ARTERIAL: 7.44 (ref 7.35–7.45)
PH UA: 5 (ref 5–9)
PH UA: 5 (ref 5–9)
PLATELET # BLD: 259 K/UL (ref 130–400)
PLATELET # BLD: 262 K/UL (ref 130–400)
PLATELET # BLD: 265 K/UL (ref 130–400)
PLATELET # BLD: 267 K/UL (ref 130–400)
PLATELET # BLD: 270 K/UL (ref 130–400)
PLATELET # BLD: 287 K/UL (ref 130–400)
PLATELET # BLD: 299 K/UL (ref 130–400)
PLATELET # BLD: 327 K/UL (ref 130–400)
PLATELET # BLD: 339 K/UL (ref 130–400)
PLATELET # BLD: 355 K/UL (ref 130–400)
PLATELET # BLD: 376 K/UL (ref 130–400)
PLATELET # BLD: 472 K/UL (ref 130–400)
PLATELET # BLD: 500 K/UL (ref 130–400)
PLATELET SLIDE REVIEW: ABNORMAL
PLATELET SLIDE REVIEW: NORMAL
PLATELET SLIDE REVIEW: NORMAL
PO2 ARTERIAL: 111 MM HG (ref 75–108)
PO2 ARTERIAL: 123 MM HG (ref 75–108)
PO2 ARTERIAL: 129 MM HG (ref 75–108)
PO2 ARTERIAL: 40 MM HG (ref 75–108)
PO2 ARTERIAL: 48 MM HG (ref 75–108)
PO2 ARTERIAL: 54 MM HG (ref 75–108)
PO2 ARTERIAL: 60 MM HG (ref 75–108)
PO2 ARTERIAL: 60 MM HG (ref 75–108)
PO2 ARTERIAL: 61 MM HG (ref 75–108)
PO2 ARTERIAL: 61 MM HG (ref 75–108)
PO2 ARTERIAL: 63 MM HG (ref 75–108)
PO2 ARTERIAL: 70 MM HG (ref 75–108)
PO2 ARTERIAL: 72 MM HG (ref 75–108)
PO2 ARTERIAL: 83 MM HG (ref 75–108)
POC CHLORIDE: 85 MEQ/L (ref 99–110)
POC CHLORIDE: 86 MEQ/L (ref 99–110)
POC CHLORIDE: 87 MEQ/L (ref 99–110)
POC CHLORIDE: 87 MEQ/L (ref 99–110)
POC CHLORIDE: 94 MEQ/L (ref 99–110)
POC CHLORIDE: 95 MEQ/L (ref 99–110)
POC CHLORIDE: 97 MEQ/L (ref 99–110)
POC CHLORIDE: 97 MEQ/L (ref 99–110)
POC CREATININE: 1.7 MG/DL (ref 0.8–1.3)
POC CREATININE: 2.1 MG/DL (ref 0.8–1.3)
POC CREATININE: 2.1 MG/DL (ref 0.8–1.3)
POC CREATININE: 3.1 MG/DL (ref 0.8–1.3)
POC CREATININE: 3.3 MG/DL (ref 0.8–1.3)
POC CREATININE: 3.5 MG/DL (ref 0.8–1.3)
POC CREATININE: 3.7 MG/DL (ref 0.8–1.3)
POC CREATININE: 3.8 MG/DL (ref 0.8–1.3)
POC CREATININE: 4.1 MG/DL (ref 0.8–1.3)
POC CREATININE: 4.2 MG/DL (ref 0.8–1.3)
POC CREATININE: 4.7 MG/DL (ref 0.8–1.3)
POC FIO2: 100
POC FIO2: 60
POC FIO2: 70
POC FIO2: 80
POC HEMATOCRIT: 32 % (ref 41–53)
POC HEMATOCRIT: 34 % (ref 41–53)
POC HEMATOCRIT: 35 % (ref 41–53)
POC HEMATOCRIT: 36 % (ref 41–53)
POC HEMATOCRIT: 36 % (ref 41–53)
POC HEMATOCRIT: 37 % (ref 41–53)
POC HEMATOCRIT: 42 % (ref 41–53)
POC POTASSIUM: 3.5 MEQ/L (ref 3.5–5.1)
POC POTASSIUM: 3.6 MEQ/L (ref 3.5–5.1)
POC POTASSIUM: 3.9 MEQ/L (ref 3.5–5.1)
POC POTASSIUM: 4 MEQ/L (ref 3.5–5.1)
POC POTASSIUM: 4.4 MEQ/L (ref 3.5–5.1)
POC POTASSIUM: 4.7 MEQ/L (ref 3.5–5.1)
POC SAMPLE TYPE: ABNORMAL
POC SODIUM: 126 MEQ/L (ref 136–145)
POC SODIUM: 129 MEQ/L (ref 136–145)
POC SODIUM: 129 MEQ/L (ref 136–145)
POC SODIUM: 130 MEQ/L (ref 136–145)
POC SODIUM: 130 MEQ/L (ref 136–145)
POC SODIUM: 132 MEQ/L (ref 136–145)
POC SODIUM: 133 MEQ/L (ref 136–145)
POC SODIUM: 135 MEQ/L (ref 136–145)
POC SODIUM: 137 MEQ/L (ref 136–145)
POC SODIUM: 140 MEQ/L (ref 136–145)
POC SODIUM: 141 MEQ/L (ref 136–145)
POIKILOCYTES: ABNORMAL
POTASSIUM REFLEX MAGNESIUM: 3.3 MEQ/L (ref 3.4–4.9)
POTASSIUM REFLEX MAGNESIUM: 3.6 MEQ/L (ref 3.4–4.9)
POTASSIUM REFLEX MAGNESIUM: 3.8 MEQ/L (ref 3.4–4.9)
POTASSIUM REFLEX MAGNESIUM: 4 MEQ/L (ref 3.4–4.9)
POTASSIUM REFLEX MAGNESIUM: 4.1 MEQ/L (ref 3.4–4.9)
POTASSIUM REFLEX MAGNESIUM: 4.2 MEQ/L (ref 3.4–4.9)
POTASSIUM REFLEX MAGNESIUM: 4.2 MEQ/L (ref 3.4–4.9)
POTASSIUM REFLEX MAGNESIUM: 4.3 MEQ/L (ref 3.4–4.9)
POTASSIUM REFLEX MAGNESIUM: 4.5 MEQ/L (ref 3.4–4.9)
POTASSIUM REFLEX MAGNESIUM: 4.6 MEQ/L (ref 3.4–4.9)
POTASSIUM REFLEX MAGNESIUM: 4.8 MEQ/L (ref 3.4–4.9)
POTASSIUM REFLEX MAGNESIUM: 4.8 MEQ/L (ref 3.4–4.9)
POTASSIUM SERPL-SCNC: 4 MEQ/L (ref 3.4–4.9)
POTASSIUM SERPL-SCNC: 4 MEQ/L (ref 3.4–4.9)
PRO-BNP: 1156 PG/ML
PROCALCITONIN: 0.19 NG/ML (ref 0–0.15)
PROCALCITONIN: 0.57 NG/ML (ref 0–0.15)
PROTEIN UA: 30 MG/DL
PROTEIN UA: 30 MG/DL
PROTHROMBIN TIME: 14 SEC (ref 12.3–14.9)
PROTHROMBIN TIME: 14.8 SEC (ref 12.3–14.9)
RBC # BLD: 3.87 M/UL (ref 4.7–6.1)
RBC # BLD: 3.96 M/UL (ref 4.7–6.1)
RBC # BLD: 4.12 M/UL (ref 4.7–6.1)
RBC # BLD: 4.24 M/UL (ref 4.7–6.1)
RBC # BLD: 4.37 M/UL (ref 4.7–6.1)
RBC # BLD: 4.39 M/UL (ref 4.7–6.1)
RBC # BLD: 4.48 M/UL (ref 4.7–6.1)
RBC # BLD: 4.52 M/UL (ref 4.7–6.1)
RBC # BLD: 4.58 M/UL (ref 4.7–6.1)
RBC # BLD: 4.61 M/UL (ref 4.7–6.1)
RBC # BLD: 4.63 M/UL (ref 4.7–6.1)
RBC # BLD: 4.65 M/UL (ref 4.7–6.1)
RBC # BLD: 5.01 M/UL (ref 4.7–6.1)
RBC UA: >100 /HPF (ref 0–5)
RBC UA: ABNORMAL /HPF (ref 0–2)
SARS-COV-2, NAAT: NOT DETECTED
SODIUM BLD-SCNC: 128 MEQ/L (ref 135–144)
SODIUM BLD-SCNC: 129 MEQ/L (ref 135–144)
SODIUM BLD-SCNC: 130 MEQ/L (ref 135–144)
SODIUM BLD-SCNC: 132 MEQ/L (ref 135–144)
SODIUM BLD-SCNC: 133 MEQ/L (ref 135–144)
SODIUM BLD-SCNC: 134 MEQ/L (ref 135–144)
SODIUM BLD-SCNC: 135 MEQ/L (ref 135–144)
SODIUM BLD-SCNC: 136 MEQ/L (ref 135–144)
SODIUM BLD-SCNC: 138 MEQ/L (ref 135–144)
SODIUM BLD-SCNC: 139 MEQ/L (ref 135–144)
SODIUM BLD-SCNC: 140 MEQ/L (ref 135–144)
SODIUM BLD-SCNC: 141 MEQ/L (ref 135–144)
SODIUM BLD-SCNC: 143 MEQ/L (ref 135–144)
SPECIFIC GRAVITY UA: 1.01 (ref 1–1.03)
SPECIFIC GRAVITY UA: 1.02 (ref 1–1.03)
TCO2 ARTERIAL: 35 (ref 22–29)
TCO2 ARTERIAL: 36 (ref 22–29)
TCO2 ARTERIAL: 37 (ref 22–29)
TCO2 ARTERIAL: 38 (ref 22–29)
TCO2 ARTERIAL: 39 (ref 22–29)
TCO2 ARTERIAL: 39 (ref 22–29)
TCO2 ARTERIAL: 40 (ref 22–29)
TCO2 ARTERIAL: 40 (ref 22–29)
TCO2 ARTERIAL: 41 (ref 22–29)
TCO2 ARTERIAL: 42 (ref 22–29)
TOTAL PROTEIN: 7.6 G/DL (ref 6.3–8)
TRIGL SERPL-MCNC: 120 MG/DL (ref 0–150)
TROPONIN: 0.03 NG/ML (ref 0–0.01)
TROPONIN: 0.04 NG/ML (ref 0–0.01)
URINE REFLEX TO CULTURE: ABNORMAL
UROBILINOGEN, URINE: 1 E.U./DL
UROBILINOGEN, URINE: 1 E.U./DL
VANCOMYCIN TROUGH: 10.4 UG/ML (ref 10–20)
WBC # BLD: 10.1 K/UL (ref 4.8–10.8)
WBC # BLD: 10.2 K/UL (ref 4.8–10.8)
WBC # BLD: 10.2 K/UL (ref 4.8–10.8)
WBC # BLD: 10.7 K/UL (ref 4.8–10.8)
WBC # BLD: 11 K/UL (ref 4.8–10.8)
WBC # BLD: 12.6 K/UL (ref 4.8–10.8)
WBC # BLD: 12.8 K/UL (ref 4.8–10.8)
WBC # BLD: 13 K/UL (ref 4.8–10.8)
WBC # BLD: 13.3 K/UL (ref 4.8–10.8)
WBC # BLD: 13.4 K/UL (ref 4.8–10.8)
WBC # BLD: 14.7 K/UL (ref 4.8–10.8)
WBC # BLD: 9.7 K/UL (ref 4.8–10.8)
WBC # BLD: 9.9 K/UL (ref 4.8–10.8)
WBC UA: ABNORMAL /HPF (ref 0–5)
WBC UA: ABNORMAL /HPF (ref 0–5)

## 2021-01-01 PROCEDURE — 2580000003 HC RX 258: Performed by: INTERNAL MEDICINE

## 2021-01-01 PROCEDURE — 6360000002 HC RX W HCPCS: Performed by: INTERNAL MEDICINE

## 2021-01-01 PROCEDURE — 93005 ELECTROCARDIOGRAM TRACING: CPT | Performed by: EMERGENCY MEDICINE

## 2021-01-01 PROCEDURE — 2000000000 HC ICU R&B

## 2021-01-01 PROCEDURE — 82330 ASSAY OF CALCIUM: CPT

## 2021-01-01 PROCEDURE — 85014 HEMATOCRIT: CPT

## 2021-01-01 PROCEDURE — 94003 VENT MGMT INPAT SUBQ DAY: CPT

## 2021-01-01 PROCEDURE — 85025 COMPLETE CBC W/AUTO DIFF WBC: CPT

## 2021-01-01 PROCEDURE — 82435 ASSAY OF BLOOD CHLORIDE: CPT

## 2021-01-01 PROCEDURE — 6360000004 HC RX CONTRAST MEDICATION: Performed by: EMERGENCY MEDICINE

## 2021-01-01 PROCEDURE — 82565 ASSAY OF CREATININE: CPT

## 2021-01-01 PROCEDURE — 6370000000 HC RX 637 (ALT 250 FOR IP): Performed by: INTERNAL MEDICINE

## 2021-01-01 PROCEDURE — 84132 ASSAY OF SERUM POTASSIUM: CPT

## 2021-01-01 PROCEDURE — 85520 HEPARIN ASSAY: CPT

## 2021-01-01 PROCEDURE — 99291 CRITICAL CARE FIRST HOUR: CPT | Performed by: INTERNAL MEDICINE

## 2021-01-01 PROCEDURE — 36600 WITHDRAWAL OF ARTERIAL BLOOD: CPT

## 2021-01-01 PROCEDURE — 80048 BASIC METABOLIC PNL TOTAL CA: CPT

## 2021-01-01 PROCEDURE — 80202 ASSAY OF VANCOMYCIN: CPT

## 2021-01-01 PROCEDURE — 82948 REAGENT STRIP/BLOOD GLUCOSE: CPT

## 2021-01-01 PROCEDURE — 87205 SMEAR GRAM STAIN: CPT

## 2021-01-01 PROCEDURE — 99233 SBSQ HOSP IP/OBS HIGH 50: CPT | Performed by: INTERNAL MEDICINE

## 2021-01-01 PROCEDURE — 84295 ASSAY OF SERUM SODIUM: CPT

## 2021-01-01 PROCEDURE — 94761 N-INVAS EAR/PLS OXIMETRY MLT: CPT

## 2021-01-01 PROCEDURE — 2500000003 HC RX 250 WO HCPCS: Performed by: INTERNAL MEDICINE

## 2021-01-01 PROCEDURE — 2700000000 HC OXYGEN THERAPY PER DAY

## 2021-01-01 PROCEDURE — 76775 US EXAM ABDO BACK WALL LIM: CPT

## 2021-01-01 PROCEDURE — 82803 BLOOD GASES ANY COMBINATION: CPT

## 2021-01-01 PROCEDURE — 93010 ELECTROCARDIOGRAM REPORT: CPT | Performed by: INTERNAL MEDICINE

## 2021-01-01 PROCEDURE — 6370000000 HC RX 637 (ALT 250 FOR IP): Performed by: NURSE PRACTITIONER

## 2021-01-01 PROCEDURE — 31500 INSERT EMERGENCY AIRWAY: CPT

## 2021-01-01 PROCEDURE — 83605 ASSAY OF LACTIC ACID: CPT

## 2021-01-01 PROCEDURE — 84145 PROCALCITONIN (PCT): CPT

## 2021-01-01 PROCEDURE — 81001 URINALYSIS AUTO W/SCOPE: CPT

## 2021-01-01 PROCEDURE — 2500000003 HC RX 250 WO HCPCS: Performed by: EMERGENCY MEDICINE

## 2021-01-01 PROCEDURE — 93306 TTE W/DOPPLER COMPLETE: CPT

## 2021-01-01 PROCEDURE — 36592 COLLECT BLOOD FROM PICC: CPT

## 2021-01-01 PROCEDURE — 96360 HYDRATION IV INFUSION INIT: CPT

## 2021-01-01 PROCEDURE — 85379 FIBRIN DEGRADATION QUANT: CPT

## 2021-01-01 PROCEDURE — 99232 SBSQ HOSP IP/OBS MODERATE 35: CPT | Performed by: INTERNAL MEDICINE

## 2021-01-01 PROCEDURE — 94002 VENT MGMT INPAT INIT DAY: CPT

## 2021-01-01 PROCEDURE — 87186 SC STD MICRODIL/AGAR DIL: CPT

## 2021-01-01 PROCEDURE — 87070 CULTURE OTHR SPECIMN AEROBIC: CPT

## 2021-01-01 PROCEDURE — 6360000002 HC RX W HCPCS: Performed by: EMERGENCY MEDICINE

## 2021-01-01 PROCEDURE — 99285 EMERGENCY DEPT VISIT HI MDM: CPT

## 2021-01-01 PROCEDURE — 96361 HYDRATE IV INFUSION ADD-ON: CPT

## 2021-01-01 PROCEDURE — 85610 PROTHROMBIN TIME: CPT

## 2021-01-01 PROCEDURE — 5A1955Z RESPIRATORY VENTILATION, GREATER THAN 96 CONSECUTIVE HOURS: ICD-10-PCS | Performed by: EMERGENCY MEDICINE

## 2021-01-01 PROCEDURE — 36415 COLL VENOUS BLD VENIPUNCTURE: CPT

## 2021-01-01 PROCEDURE — 93005 ELECTROCARDIOGRAM TRACING: CPT | Performed by: INTERNAL MEDICINE

## 2021-01-01 PROCEDURE — 87635 SARS-COV-2 COVID-19 AMP PRB: CPT

## 2021-01-01 PROCEDURE — 87040 BLOOD CULTURE FOR BACTERIA: CPT

## 2021-01-01 PROCEDURE — 71045 X-RAY EXAM CHEST 1 VIEW: CPT

## 2021-01-01 PROCEDURE — 84484 ASSAY OF TROPONIN QUANT: CPT

## 2021-01-01 PROCEDURE — 82570 ASSAY OF URINE CREATININE: CPT

## 2021-01-01 PROCEDURE — 89220 SPUTUM SPECIMEN COLLECTION: CPT

## 2021-01-01 PROCEDURE — 83735 ASSAY OF MAGNESIUM: CPT

## 2021-01-01 PROCEDURE — 36573 INSJ PICC RS&I 5 YR+: CPT

## 2021-01-01 PROCEDURE — 2580000003 HC RX 258: Performed by: EMERGENCY MEDICINE

## 2021-01-01 PROCEDURE — 51702 INSERT TEMP BLADDER CATH: CPT

## 2021-01-01 PROCEDURE — 36591 DRAW BLOOD OFF VENOUS DEVICE: CPT

## 2021-01-01 PROCEDURE — 2500000003 HC RX 250 WO HCPCS

## 2021-01-01 PROCEDURE — 80053 COMPREHEN METABOLIC PANEL: CPT

## 2021-01-01 PROCEDURE — 99223 1ST HOSP IP/OBS HIGH 75: CPT | Performed by: INTERNAL MEDICINE

## 2021-01-01 PROCEDURE — 0BH17EZ INSERTION OF ENDOTRACHEAL AIRWAY INTO TRACHEA, VIA NATURAL OR ARTIFICIAL OPENING: ICD-10-PCS | Performed by: EMERGENCY MEDICINE

## 2021-01-01 PROCEDURE — 82043 UR ALBUMIN QUANTITATIVE: CPT

## 2021-01-01 PROCEDURE — 71275 CT ANGIOGRAPHY CHEST: CPT

## 2021-01-01 PROCEDURE — 80061 LIPID PANEL: CPT

## 2021-01-01 PROCEDURE — 02HV33Z INSERTION OF INFUSION DEVICE INTO SUPERIOR VENA CAVA, PERCUTANEOUS APPROACH: ICD-10-PCS | Performed by: RADIOLOGY

## 2021-01-01 PROCEDURE — 87077 CULTURE AEROBIC IDENTIFY: CPT

## 2021-01-01 PROCEDURE — 2709999900 IR PICC WO SQ PORT/PUMP > 5 YEARS

## 2021-01-01 PROCEDURE — 83880 ASSAY OF NATRIURETIC PEPTIDE: CPT

## 2021-01-01 PROCEDURE — 85730 THROMBOPLASTIN TIME PARTIAL: CPT

## 2021-01-01 RX ORDER — INSULIN GLARGINE 100 [IU]/ML
35 INJECTION, SOLUTION SUBCUTANEOUS DAILY
Status: DISCONTINUED | OUTPATIENT
Start: 2021-01-01 | End: 2021-01-01

## 2021-01-01 RX ORDER — ATORVASTATIN CALCIUM 40 MG/1
40 TABLET, FILM COATED ORAL NIGHTLY
Status: DISCONTINUED | OUTPATIENT
Start: 2021-01-01 | End: 2021-01-01 | Stop reason: ALTCHOICE

## 2021-01-01 RX ORDER — METOLAZONE 2.5 MG/1
10 TABLET ORAL DAILY
Status: DISCONTINUED | OUTPATIENT
Start: 2021-01-01 | End: 2021-01-01

## 2021-01-01 RX ORDER — GLYCOPYRROLATE 1 MG/5 ML
0.2 SYRINGE (ML) INTRAVENOUS EVERY 4 HOURS PRN
Status: DISCONTINUED | OUTPATIENT
Start: 2021-01-01 | End: 2021-01-01 | Stop reason: HOSPADM

## 2021-01-01 RX ORDER — METOLAZONE 2.5 MG/1
5 TABLET ORAL DAILY
Status: DISCONTINUED | OUTPATIENT
Start: 2021-01-01 | End: 2021-01-01

## 2021-01-01 RX ORDER — MORPHINE SULFATE 2 MG/ML
2 INJECTION, SOLUTION INTRAMUSCULAR; INTRAVENOUS
Status: DISCONTINUED | OUTPATIENT
Start: 2021-01-01 | End: 2021-01-01 | Stop reason: HOSPADM

## 2021-01-01 RX ORDER — ACETAMINOPHEN 325 MG/1
650 TABLET ORAL EVERY 6 HOURS PRN
Status: DISCONTINUED | OUTPATIENT
Start: 2021-01-01 | End: 2021-01-01 | Stop reason: HOSPADM

## 2021-01-01 RX ORDER — MAGNESIUM SULFATE IN WATER 40 MG/ML
2000 INJECTION, SOLUTION INTRAVENOUS PRN
Status: DISCONTINUED | OUTPATIENT
Start: 2021-01-01 | End: 2021-01-01 | Stop reason: HOSPADM

## 2021-01-01 RX ORDER — SODIUM CHLORIDE 9 MG/ML
25 INJECTION, SOLUTION INTRAVENOUS PRN
Status: DISCONTINUED | OUTPATIENT
Start: 2021-01-01 | End: 2021-01-01 | Stop reason: HOSPADM

## 2021-01-01 RX ORDER — DEXTROSE, SODIUM CHLORIDE, SODIUM LACTATE, POTASSIUM CHLORIDE, AND CALCIUM CHLORIDE 5; .6; .31; .03; .02 G/100ML; G/100ML; G/100ML; G/100ML; G/100ML
INJECTION, SOLUTION INTRAVENOUS CONTINUOUS
Status: DISCONTINUED | OUTPATIENT
Start: 2021-01-01 | End: 2021-01-01

## 2021-01-01 RX ORDER — HEPARIN SODIUM 1000 [USP'U]/ML
5000 INJECTION, SOLUTION INTRAVENOUS; SUBCUTANEOUS PRN
Status: DISCONTINUED | OUTPATIENT
Start: 2021-01-01 | End: 2021-01-01 | Stop reason: HOSPADM

## 2021-01-01 RX ORDER — DEXTROSE MONOHYDRATE 25 G/50ML
12.5 INJECTION, SOLUTION INTRAVENOUS PRN
Status: DISCONTINUED | OUTPATIENT
Start: 2021-01-01 | End: 2021-01-01 | Stop reason: HOSPADM

## 2021-01-01 RX ORDER — SODIUM CHLORIDE 0.9 % (FLUSH) 0.9 %
10 SYRINGE (ML) INJECTION PRN
Status: DISCONTINUED | OUTPATIENT
Start: 2021-01-01 | End: 2021-01-01

## 2021-01-01 RX ORDER — PROPOFOL 10 MG/ML
5-50 INJECTION, EMULSION INTRAVENOUS
Status: DISCONTINUED | OUTPATIENT
Start: 2021-01-01 | End: 2021-01-01 | Stop reason: DRUGHIGH

## 2021-01-01 RX ORDER — SODIUM CHLORIDE 0.9 % (FLUSH) 0.9 %
10 SYRINGE (ML) INJECTION EVERY 12 HOURS SCHEDULED
Status: DISCONTINUED | OUTPATIENT
Start: 2021-01-01 | End: 2021-01-01

## 2021-01-01 RX ORDER — INSULIN GLARGINE 100 [IU]/ML
10 INJECTION, SOLUTION SUBCUTANEOUS ONCE
Status: COMPLETED | OUTPATIENT
Start: 2021-01-01 | End: 2021-01-01

## 2021-01-01 RX ORDER — POLYETHYLENE GLYCOL 3350 17 G/17G
17 POWDER, FOR SOLUTION ORAL DAILY PRN
Status: DISCONTINUED | OUTPATIENT
Start: 2021-01-01 | End: 2021-01-01

## 2021-01-01 RX ORDER — NITROGLYCERIN 0.4 MG/1
0.4 TABLET SUBLINGUAL EVERY 5 MIN PRN
Status: DISCONTINUED | OUTPATIENT
Start: 2021-01-01 | End: 2021-01-01 | Stop reason: HOSPADM

## 2021-01-01 RX ORDER — PROPOFOL 10 MG/ML
INJECTION, EMULSION INTRAVENOUS
Status: DISPENSED
Start: 2021-01-01 | End: 2021-01-01

## 2021-01-01 RX ORDER — INSULIN GLARGINE 100 [IU]/ML
50 INJECTION, SOLUTION SUBCUTANEOUS 2 TIMES DAILY
Status: DISCONTINUED | OUTPATIENT
Start: 2021-01-01 | End: 2021-01-01 | Stop reason: HOSPADM

## 2021-01-01 RX ORDER — DEXTROSE MONOHYDRATE 25 G/50ML
12.5 INJECTION, SOLUTION INTRAVENOUS ONCE
Status: COMPLETED | OUTPATIENT
Start: 2021-01-01 | End: 2021-01-01

## 2021-01-01 RX ORDER — ETOMIDATE 2 MG/ML
30 INJECTION INTRAVENOUS ONCE
Status: COMPLETED | OUTPATIENT
Start: 2021-01-01 | End: 2021-01-01

## 2021-01-01 RX ORDER — 0.9 % SODIUM CHLORIDE 0.9 %
500 INTRAVENOUS SOLUTION INTRAVENOUS ONCE
Status: COMPLETED | OUTPATIENT
Start: 2021-01-01 | End: 2021-01-01

## 2021-01-01 RX ORDER — METOPROLOL TARTRATE 5 MG/5ML
5 INJECTION INTRAVENOUS EVERY 6 HOURS PRN
Status: DISCONTINUED | OUTPATIENT
Start: 2021-01-01 | End: 2021-01-01

## 2021-01-01 RX ORDER — DEXTROSE AND SODIUM CHLORIDE 5; .45 G/100ML; G/100ML
INJECTION, SOLUTION INTRAVENOUS CONTINUOUS
Status: DISCONTINUED | OUTPATIENT
Start: 2021-01-01 | End: 2021-01-01

## 2021-01-01 RX ORDER — HYDRALAZINE HYDROCHLORIDE 50 MG/1
50 TABLET, FILM COATED ORAL EVERY 12 HOURS SCHEDULED
Status: DISCONTINUED | OUTPATIENT
Start: 2021-01-01 | End: 2021-01-01

## 2021-01-01 RX ORDER — VECURONIUM BROMIDE 1 MG/ML
10 INJECTION, POWDER, LYOPHILIZED, FOR SOLUTION INTRAVENOUS ONCE
Status: COMPLETED | OUTPATIENT
Start: 2021-01-01 | End: 2021-01-01

## 2021-01-01 RX ORDER — ETOMIDATE 2 MG/ML
INJECTION INTRAVENOUS
Status: COMPLETED
Start: 2021-01-01 | End: 2021-01-01

## 2021-01-01 RX ORDER — SODIUM CHLORIDE 0.9 % (FLUSH) 0.9 %
5-40 SYRINGE (ML) INJECTION EVERY 12 HOURS SCHEDULED
Status: DISCONTINUED | OUTPATIENT
Start: 2021-01-01 | End: 2021-01-01 | Stop reason: HOSPADM

## 2021-01-01 RX ORDER — LIDOCAINE HYDROCHLORIDE 20 MG/ML
5 INJECTION, SOLUTION INFILTRATION; PERINEURAL ONCE
Status: COMPLETED | OUTPATIENT
Start: 2021-01-01 | End: 2021-01-01

## 2021-01-01 RX ORDER — NICOTINE POLACRILEX 4 MG
15 LOZENGE BUCCAL PRN
Status: DISCONTINUED | OUTPATIENT
Start: 2021-01-01 | End: 2021-01-01 | Stop reason: HOSPADM

## 2021-01-01 RX ORDER — 0.9 % SODIUM CHLORIDE 0.9 %
500 INTRAVENOUS SOLUTION INTRAVENOUS ONCE
Status: DISCONTINUED | OUTPATIENT
Start: 2021-01-01 | End: 2021-01-01 | Stop reason: HOSPADM

## 2021-01-01 RX ORDER — ISOSORBIDE MONONITRATE 30 MG/1
60 TABLET, EXTENDED RELEASE ORAL DAILY
Status: DISCONTINUED | OUTPATIENT
Start: 2021-01-01 | End: 2021-01-01

## 2021-01-01 RX ORDER — LACTULOSE 10 G/15ML
20 SOLUTION ORAL 3 TIMES DAILY
Status: COMPLETED | OUTPATIENT
Start: 2021-01-01 | End: 2021-01-01

## 2021-01-01 RX ORDER — SODIUM CHLORIDE 9 MG/ML
INJECTION, SOLUTION INTRAVENOUS
Status: DISCONTINUED
Start: 2021-01-01 | End: 2021-01-01

## 2021-01-01 RX ORDER — ASPIRIN 81 MG/1
324 TABLET, CHEWABLE ORAL DAILY
Status: DISCONTINUED | OUTPATIENT
Start: 2021-01-01 | End: 2021-01-01 | Stop reason: HOSPADM

## 2021-01-01 RX ORDER — ISOSORBIDE DINITRATE 10 MG/1
20 TABLET ORAL 3 TIMES DAILY
Status: DISCONTINUED | OUTPATIENT
Start: 2021-01-01 | End: 2021-01-01 | Stop reason: HOSPADM

## 2021-01-01 RX ORDER — POTASSIUM CHLORIDE 20 MEQ/1
40 TABLET, EXTENDED RELEASE ORAL PRN
Status: DISCONTINUED | OUTPATIENT
Start: 2021-01-01 | End: 2021-01-01 | Stop reason: HOSPADM

## 2021-01-01 RX ORDER — VECURONIUM BROMIDE 1 MG/ML
10 INJECTION, POWDER, LYOPHILIZED, FOR SOLUTION INTRAVENOUS ONCE
Status: DISCONTINUED | OUTPATIENT
Start: 2021-01-01 | End: 2021-01-01 | Stop reason: HOSPADM

## 2021-01-01 RX ORDER — INSULIN GLARGINE 100 [IU]/ML
35 INJECTION, SOLUTION SUBCUTANEOUS 2 TIMES DAILY
Status: DISCONTINUED | OUTPATIENT
Start: 2021-01-01 | End: 2021-01-01

## 2021-01-01 RX ORDER — CHLORHEXIDINE GLUCONATE 0.12 MG/ML
15 RINSE ORAL 2 TIMES DAILY
Status: DISCONTINUED | OUTPATIENT
Start: 2021-01-01 | End: 2021-01-01

## 2021-01-01 RX ORDER — POLYETHYLENE GLYCOL 3350 17 G/17G
17 POWDER, FOR SOLUTION ORAL DAILY
Status: DISCONTINUED | OUTPATIENT
Start: 2021-01-01 | End: 2021-01-01 | Stop reason: HOSPADM

## 2021-01-01 RX ORDER — PROPOFOL 10 MG/ML
5-50 INJECTION, EMULSION INTRAVENOUS CONTINUOUS
Status: DISCONTINUED | OUTPATIENT
Start: 2021-01-01 | End: 2021-01-01 | Stop reason: SDUPTHER

## 2021-01-01 RX ORDER — LORAZEPAM 2 MG/ML
0.5 INJECTION INTRAMUSCULAR
Status: DISCONTINUED | OUTPATIENT
Start: 2021-01-01 | End: 2021-01-01 | Stop reason: HOSPADM

## 2021-01-01 RX ORDER — INSULIN GLARGINE 100 [IU]/ML
20 INJECTION, SOLUTION SUBCUTANEOUS DAILY
Status: DISCONTINUED | OUTPATIENT
Start: 2021-01-01 | End: 2021-01-01

## 2021-01-01 RX ORDER — HYDRALAZINE HYDROCHLORIDE 25 MG/1
25 TABLET, FILM COATED ORAL EVERY 12 HOURS SCHEDULED
Status: DISCONTINUED | OUTPATIENT
Start: 2021-01-01 | End: 2021-01-01

## 2021-01-01 RX ORDER — DEXTROSE MONOHYDRATE 50 MG/ML
100 INJECTION, SOLUTION INTRAVENOUS PRN
Status: DISCONTINUED | OUTPATIENT
Start: 2021-01-01 | End: 2021-01-01 | Stop reason: HOSPADM

## 2021-01-01 RX ORDER — HEPARIN SODIUM 10000 [USP'U]/100ML
5-30 INJECTION, SOLUTION INTRAVENOUS CONTINUOUS
Status: DISCONTINUED | OUTPATIENT
Start: 2021-01-01 | End: 2021-01-01 | Stop reason: HOSPADM

## 2021-01-01 RX ORDER — ATROPINE SULFATE 1 MG/ML
0.5 INJECTION, SOLUTION INTRAMUSCULAR; INTRAVENOUS; SUBCUTANEOUS ONCE
Status: COMPLETED | OUTPATIENT
Start: 2021-01-01 | End: 2021-01-01

## 2021-01-01 RX ORDER — SODIUM CHLORIDE 0.9 % (FLUSH) 0.9 %
5-40 SYRINGE (ML) INJECTION PRN
Status: DISCONTINUED | OUTPATIENT
Start: 2021-01-01 | End: 2021-01-01 | Stop reason: HOSPADM

## 2021-01-01 RX ORDER — POTASSIUM CHLORIDE 7.45 MG/ML
10 INJECTION INTRAVENOUS PRN
Status: DISCONTINUED | OUTPATIENT
Start: 2021-01-01 | End: 2021-01-01 | Stop reason: HOSPADM

## 2021-01-01 RX ORDER — IPRATROPIUM BROMIDE AND ALBUTEROL SULFATE 2.5; .5 MG/3ML; MG/3ML
1 SOLUTION RESPIRATORY (INHALATION)
Status: DISCONTINUED | OUTPATIENT
Start: 2021-01-01 | End: 2021-01-01

## 2021-01-01 RX ORDER — IPRATROPIUM BROMIDE AND ALBUTEROL SULFATE 2.5; .5 MG/3ML; MG/3ML
1 SOLUTION RESPIRATORY (INHALATION) EVERY 4 HOURS PRN
Status: DISCONTINUED | OUTPATIENT
Start: 2021-01-01 | End: 2021-01-01

## 2021-01-01 RX ORDER — ROCURONIUM BROMIDE 10 MG/ML
INJECTION, SOLUTION INTRAVENOUS
Status: COMPLETED
Start: 2021-01-01 | End: 2021-01-01

## 2021-01-01 RX ORDER — PROPOFOL 10 MG/ML
5-50 INJECTION, EMULSION INTRAVENOUS
Status: DISCONTINUED | OUTPATIENT
Start: 2021-01-01 | End: 2021-01-01 | Stop reason: HOSPADM

## 2021-01-01 RX ORDER — SODIUM CHLORIDE 9 MG/ML
250 INJECTION, SOLUTION INTRAVENOUS ONCE
Status: COMPLETED | OUTPATIENT
Start: 2021-01-01 | End: 2021-01-01

## 2021-01-01 RX ORDER — LORAZEPAM 2 MG/ML
1 INJECTION INTRAMUSCULAR
Status: DISCONTINUED | OUTPATIENT
Start: 2021-01-01 | End: 2021-01-01 | Stop reason: HOSPADM

## 2021-01-01 RX ORDER — HYDRALAZINE HYDROCHLORIDE 20 MG/ML
10 INJECTION INTRAMUSCULAR; INTRAVENOUS EVERY 6 HOURS PRN
Status: DISCONTINUED | OUTPATIENT
Start: 2021-01-01 | End: 2021-01-01 | Stop reason: HOSPADM

## 2021-01-01 RX ORDER — MORPHINE SULFATE 4 MG/ML
4 INJECTION, SOLUTION INTRAMUSCULAR; INTRAVENOUS
Status: DISCONTINUED | OUTPATIENT
Start: 2021-01-01 | End: 2021-01-01 | Stop reason: HOSPADM

## 2021-01-01 RX ORDER — ROSUVASTATIN CALCIUM 10 MG/1
10 TABLET, COATED ORAL NIGHTLY
Status: DISCONTINUED | OUTPATIENT
Start: 2021-01-01 | End: 2021-01-01 | Stop reason: HOSPADM

## 2021-01-01 RX ORDER — ACETAMINOPHEN 650 MG/1
650 SUPPOSITORY RECTAL EVERY 6 HOURS PRN
Status: DISCONTINUED | OUTPATIENT
Start: 2021-01-01 | End: 2021-01-01 | Stop reason: HOSPADM

## 2021-01-01 RX ORDER — ONDANSETRON 2 MG/ML
4 INJECTION INTRAMUSCULAR; INTRAVENOUS EVERY 6 HOURS PRN
Status: DISCONTINUED | OUTPATIENT
Start: 2021-01-01 | End: 2021-01-01 | Stop reason: HOSPADM

## 2021-01-01 RX ORDER — HEPARIN SODIUM 5000 [USP'U]/ML
5000 INJECTION, SOLUTION INTRAVENOUS; SUBCUTANEOUS EVERY 8 HOURS SCHEDULED
Status: DISCONTINUED | OUTPATIENT
Start: 2021-01-01 | End: 2021-01-01

## 2021-01-01 RX ORDER — POTASSIUM CHLORIDE 20 MEQ/1
20 TABLET, EXTENDED RELEASE ORAL 2 TIMES DAILY WITH MEALS
Status: DISCONTINUED | OUTPATIENT
Start: 2021-01-01 | End: 2021-01-01

## 2021-01-01 RX ORDER — INSULIN GLARGINE 100 [IU]/ML
25 INJECTION, SOLUTION SUBCUTANEOUS 2 TIMES DAILY
Status: DISCONTINUED | OUTPATIENT
Start: 2021-01-01 | End: 2021-01-01

## 2021-01-01 RX ORDER — ONDANSETRON 4 MG/1
4 TABLET, ORALLY DISINTEGRATING ORAL EVERY 8 HOURS PRN
Status: DISCONTINUED | OUTPATIENT
Start: 2021-01-01 | End: 2021-01-01 | Stop reason: HOSPADM

## 2021-01-01 RX ORDER — ASPIRIN 300 MG/1
300 SUPPOSITORY RECTAL DAILY
Status: DISCONTINUED | OUTPATIENT
Start: 2021-01-01 | End: 2021-01-01

## 2021-01-01 RX ORDER — HEPARIN SODIUM 1000 [USP'U]/ML
10000 INJECTION, SOLUTION INTRAVENOUS; SUBCUTANEOUS PRN
Status: DISCONTINUED | OUTPATIENT
Start: 2021-01-01 | End: 2021-01-01 | Stop reason: HOSPADM

## 2021-01-01 RX ADMIN — FENTANYL CITRATE 175 MCG/HR: 50 INJECTION, SOLUTION INTRAMUSCULAR; INTRAVENOUS at 19:30

## 2021-01-01 RX ADMIN — INSULIN LISPRO 9 UNITS: 100 INJECTION, SOLUTION INTRAVENOUS; SUBCUTANEOUS at 14:40

## 2021-01-01 RX ADMIN — ROSUVASTATIN CALCIUM 10 MG: 10 TABLET, FILM COATED ORAL at 21:40

## 2021-01-01 RX ADMIN — FENTANYL CITRATE: 50 INJECTION, SOLUTION INTRAMUSCULAR; INTRAVENOUS at 00:05

## 2021-01-01 RX ADMIN — SODIUM CHLORIDE, PRESERVATIVE FREE 10 ML: 5 INJECTION INTRAVENOUS at 09:05

## 2021-01-01 RX ADMIN — FAMOTIDINE 20 MG: 10 INJECTION, SOLUTION INTRAVENOUS at 08:25

## 2021-01-01 RX ADMIN — SODIUM CHLORIDE 500 ML: 9 INJECTION, SOLUTION INTRAVENOUS at 13:37

## 2021-01-01 RX ADMIN — INSULIN LISPRO 9 UNITS: 100 INJECTION, SOLUTION INTRAVENOUS; SUBCUTANEOUS at 21:30

## 2021-01-01 RX ADMIN — SODIUM CHLORIDE, PRESERVATIVE FREE 10 ML: 5 INJECTION INTRAVENOUS at 20:37

## 2021-01-01 RX ADMIN — PROPOFOL 30 MCG/KG/MIN: 10 INJECTION, EMULSION INTRAVENOUS at 18:07

## 2021-01-01 RX ADMIN — SODIUM CHLORIDE, PRESERVATIVE FREE 10 ML: 5 INJECTION INTRAVENOUS at 21:10

## 2021-01-01 RX ADMIN — PROPOFOL 20 MCG/KG/MIN: 10 INJECTION, EMULSION INTRAVENOUS at 19:22

## 2021-01-01 RX ADMIN — POLYETHYLENE GLYCOL 3350 17 G: 17 POWDER, FOR SOLUTION ORAL at 11:06

## 2021-01-01 RX ADMIN — PROPOFOL 25 MCG/KG/MIN: 10 INJECTION, EMULSION INTRAVENOUS at 18:15

## 2021-01-01 RX ADMIN — DOCUSATE SODIUM 100 MG: 50 LIQUID ORAL at 08:44

## 2021-01-01 RX ADMIN — POLYETHYLENE GLYCOL 3350 17 G: 17 POWDER, FOR SOLUTION ORAL at 07:32

## 2021-01-01 RX ADMIN — PROPOFOL 50 MCG/KG/MIN: 10 INJECTION, EMULSION INTRAVENOUS at 02:46

## 2021-01-01 RX ADMIN — FAMOTIDINE 20 MG: 10 INJECTION, SOLUTION INTRAVENOUS at 08:11

## 2021-01-01 RX ADMIN — PROPOFOL 35 MCG/KG/MIN: 10 INJECTION, EMULSION INTRAVENOUS at 04:21

## 2021-01-01 RX ADMIN — DOCUSATE SODIUM 100 MG: 50 LIQUID ORAL at 11:06

## 2021-01-01 RX ADMIN — INSULIN LISPRO 9 UNITS: 100 INJECTION, SOLUTION INTRAVENOUS; SUBCUTANEOUS at 08:25

## 2021-01-01 RX ADMIN — FENTANYL CITRATE 175 MCG/HR: 50 INJECTION, SOLUTION INTRAMUSCULAR; INTRAVENOUS at 21:43

## 2021-01-01 RX ADMIN — FUROSEMIDE 10 MG/HR: 10 INJECTION, SOLUTION INTRAMUSCULAR; INTRAVENOUS at 05:20

## 2021-01-01 RX ADMIN — POTASSIUM BICARBONATE 20 MEQ: 782 TABLET, EFFERVESCENT ORAL at 21:26

## 2021-01-01 RX ADMIN — PROPOFOL 50 MCG/KG/MIN: 10 INJECTION, EMULSION INTRAVENOUS at 17:08

## 2021-01-01 RX ADMIN — CHLORHEXIDINE GLUCONATE 15 ML: 1.2 RINSE ORAL at 08:38

## 2021-01-01 RX ADMIN — PROPOFOL 20 MCG/KG/MIN: 10 INJECTION, EMULSION INTRAVENOUS at 16:57

## 2021-01-01 RX ADMIN — DOCUSATE SODIUM 100 MG: 50 LIQUID ORAL at 21:09

## 2021-01-01 RX ADMIN — PROPOFOL 50 MCG/KG/MIN: 10 INJECTION, EMULSION INTRAVENOUS at 12:36

## 2021-01-01 RX ADMIN — ETOMIDATE INJECTION 20 MG: 2 SOLUTION INTRAVENOUS at 12:05

## 2021-01-01 RX ADMIN — HYDRALAZINE HYDROCHLORIDE 50 MG: 50 TABLET, FILM COATED ORAL at 21:09

## 2021-01-01 RX ADMIN — PROPOFOL 20 MCG/KG/MIN: 10 INJECTION, EMULSION INTRAVENOUS at 09:41

## 2021-01-01 RX ADMIN — INSULIN LISPRO 9 UNITS: 100 INJECTION, SOLUTION INTRAVENOUS; SUBCUTANEOUS at 06:13

## 2021-01-01 RX ADMIN — FAMOTIDINE 20 MG: 10 INJECTION, SOLUTION INTRAVENOUS at 07:44

## 2021-01-01 RX ADMIN — ROSUVASTATIN CALCIUM 10 MG: 10 TABLET, FILM COATED ORAL at 20:46

## 2021-01-01 RX ADMIN — SODIUM CHLORIDE 250 ML: 9 INJECTION, SOLUTION INTRAVENOUS at 10:52

## 2021-01-01 RX ADMIN — PROPOFOL 40 MCG/KG/MIN: 10 INJECTION, EMULSION INTRAVENOUS at 22:04

## 2021-01-01 RX ADMIN — HYDRALAZINE HYDROCHLORIDE 25 MG: 25 TABLET, FILM COATED ORAL at 21:25

## 2021-01-01 RX ADMIN — ISOSORBIDE DINITRATE 20 MG: 10 TABLET ORAL at 09:10

## 2021-01-01 RX ADMIN — PROPOFOL 50 MCG/KG/MIN: 10 INJECTION, EMULSION INTRAVENOUS at 16:31

## 2021-01-01 RX ADMIN — PIPERACILLIN AND TAZOBACTAM 3375 MG: 3; .375 INJECTION, POWDER, LYOPHILIZED, FOR SOLUTION INTRAVENOUS at 00:30

## 2021-01-01 RX ADMIN — DEXTROSE MONOHYDRATE 25 G: 25 INJECTION, SOLUTION INTRAVENOUS at 19:30

## 2021-01-01 RX ADMIN — FAMOTIDINE 20 MG: 10 INJECTION, SOLUTION INTRAVENOUS at 20:09

## 2021-01-01 RX ADMIN — HEPARIN SODIUM 5000 UNITS: 5000 INJECTION INTRAVENOUS; SUBCUTANEOUS at 13:56

## 2021-01-01 RX ADMIN — CHLOROTHIAZIDE SODIUM 500 MG: 500 INJECTION, POWDER, LYOPHILIZED, FOR SOLUTION INTRAVENOUS at 14:36

## 2021-01-01 RX ADMIN — POLYETHYLENE GLYCOL 3350 17 G: 17 POWDER, FOR SOLUTION ORAL at 07:55

## 2021-01-01 RX ADMIN — CHLORHEXIDINE GLUCONATE 15 ML: 1.2 RINSE ORAL at 21:09

## 2021-01-01 RX ADMIN — PIPERACILLIN AND TAZOBACTAM 3375 MG: 3; .375 INJECTION, POWDER, LYOPHILIZED, FOR SOLUTION INTRAVENOUS at 15:30

## 2021-01-01 RX ADMIN — DEXTROSE MONOHYDRATE 12.5 G: 25 INJECTION, SOLUTION INTRAVENOUS at 06:53

## 2021-01-01 RX ADMIN — INSULIN LISPRO 9 UNITS: 100 INJECTION, SOLUTION INTRAVENOUS; SUBCUTANEOUS at 12:38

## 2021-01-01 RX ADMIN — PIPERACILLIN AND TAZOBACTAM 3375 MG: 3; .375 INJECTION, POWDER, LYOPHILIZED, FOR SOLUTION INTRAVENOUS at 06:30

## 2021-01-01 RX ADMIN — HEPARIN SODIUM 5000 UNITS: 5000 INJECTION INTRAVENOUS; SUBCUTANEOUS at 06:13

## 2021-01-01 RX ADMIN — HEPARIN SODIUM 5000 UNITS: 5000 INJECTION INTRAVENOUS; SUBCUTANEOUS at 23:30

## 2021-01-01 RX ADMIN — PIPERACILLIN AND TAZOBACTAM 3375 MG: 3; .375 INJECTION, POWDER, LYOPHILIZED, FOR SOLUTION INTRAVENOUS at 23:15

## 2021-01-01 RX ADMIN — CHLOROTHIAZIDE SODIUM 500 MG: 500 INJECTION, POWDER, LYOPHILIZED, FOR SOLUTION INTRAVENOUS at 20:36

## 2021-01-01 RX ADMIN — FUROSEMIDE 15 MG/HR: 10 INJECTION, SOLUTION INTRAVENOUS at 12:01

## 2021-01-01 RX ADMIN — ACETAMINOPHEN 650 MG: 325 TABLET ORAL at 07:42

## 2021-01-01 RX ADMIN — HEPARIN SODIUM 5000 UNITS: 5000 INJECTION INTRAVENOUS; SUBCUTANEOUS at 06:00

## 2021-01-01 RX ADMIN — INSULIN LISPRO 9 UNITS: 100 INJECTION, SOLUTION INTRAVENOUS; SUBCUTANEOUS at 11:44

## 2021-01-01 RX ADMIN — ISOSORBIDE DINITRATE 20 MG: 10 TABLET ORAL at 20:54

## 2021-01-01 RX ADMIN — PROPOFOL 25 MCG/KG/MIN: 10 INJECTION, EMULSION INTRAVENOUS at 15:47

## 2021-01-01 RX ADMIN — INSULIN LISPRO 9 UNITS: 100 INJECTION, SOLUTION INTRAVENOUS; SUBCUTANEOUS at 04:15

## 2021-01-01 RX ADMIN — PROPOFOL 25 MCG/KG/MIN: 10 INJECTION, EMULSION INTRAVENOUS at 07:14

## 2021-01-01 RX ADMIN — PROPOFOL 40 MCG/KG/MIN: 10 INJECTION, EMULSION INTRAVENOUS at 01:30

## 2021-01-01 RX ADMIN — METOLAZONE 5 MG: 2.5 TABLET ORAL at 11:28

## 2021-01-01 RX ADMIN — FUROSEMIDE 10 MG/HR: 10 INJECTION, SOLUTION INTRAMUSCULAR; INTRAVENOUS at 09:06

## 2021-01-01 RX ADMIN — FAMOTIDINE 20 MG: 10 INJECTION, SOLUTION INTRAVENOUS at 20:53

## 2021-01-01 RX ADMIN — FAMOTIDINE 20 MG: 10 INJECTION, SOLUTION INTRAVENOUS at 21:37

## 2021-01-01 RX ADMIN — FUROSEMIDE 15 MG/HR: 10 INJECTION, SOLUTION INTRAMUSCULAR; INTRAVENOUS at 15:37

## 2021-01-01 RX ADMIN — FUROSEMIDE 15 MG/HR: 10 INJECTION, SOLUTION INTRAMUSCULAR; INTRAVENOUS at 10:56

## 2021-01-01 RX ADMIN — INSULIN LISPRO 6 UNITS: 100 INJECTION, SOLUTION INTRAVENOUS; SUBCUTANEOUS at 08:46

## 2021-01-01 RX ADMIN — INSULIN GLARGINE 25 UNITS: 100 INJECTION, SOLUTION SUBCUTANEOUS at 21:31

## 2021-01-01 RX ADMIN — FENTANYL CITRATE 100 MCG/HR: 50 INJECTION, SOLUTION INTRAMUSCULAR; INTRAVENOUS at 16:40

## 2021-01-01 RX ADMIN — PROPOFOL 20 MCG/KG/MIN: 10 INJECTION, EMULSION INTRAVENOUS at 14:25

## 2021-01-01 RX ADMIN — INSULIN LISPRO 12 UNITS: 100 INJECTION, SOLUTION INTRAVENOUS; SUBCUTANEOUS at 00:36

## 2021-01-01 RX ADMIN — POTASSIUM BICARBONATE 40 MEQ: 782 TABLET, EFFERVESCENT ORAL at 08:33

## 2021-01-01 RX ADMIN — PIPERACILLIN AND TAZOBACTAM 3375 MG: 3; .375 INJECTION, POWDER, FOR SOLUTION INTRAVENOUS at 15:11

## 2021-01-01 RX ADMIN — FUROSEMIDE 15 MG/HR: 10 INJECTION, SOLUTION INTRAVENOUS at 23:52

## 2021-01-01 RX ADMIN — FENTANYL CITRATE 175 MCG/HR: 50 INJECTION, SOLUTION INTRAMUSCULAR; INTRAVENOUS at 06:20

## 2021-01-01 RX ADMIN — PROPOFOL 35 MCG/KG/MIN: 10 INJECTION, EMULSION INTRAVENOUS at 21:28

## 2021-01-01 RX ADMIN — INSULIN LISPRO 9 UNITS: 100 INJECTION, SOLUTION INTRAVENOUS; SUBCUTANEOUS at 16:51

## 2021-01-01 RX ADMIN — ROSUVASTATIN CALCIUM 10 MG: 10 TABLET, FILM COATED ORAL at 19:45

## 2021-01-01 RX ADMIN — PROPOFOL 50 MCG/KG/MIN: 10 INJECTION, EMULSION INTRAVENOUS at 03:50

## 2021-01-01 RX ADMIN — DOCUSATE SODIUM 100 MG: 50 LIQUID ORAL at 07:55

## 2021-01-01 RX ADMIN — PIPERACILLIN AND TAZOBACTAM 3375 MG: 3; .375 INJECTION, POWDER, LYOPHILIZED, FOR SOLUTION INTRAVENOUS at 06:53

## 2021-01-01 RX ADMIN — SODIUM CHLORIDE, PRESERVATIVE FREE 10 ML: 5 INJECTION INTRAVENOUS at 20:53

## 2021-01-01 RX ADMIN — PROPOFOL 25 MCG/KG/MIN: 10 INJECTION, EMULSION INTRAVENOUS at 14:40

## 2021-01-01 RX ADMIN — PROPOFOL 30 MCG/KG/MIN: 10 INJECTION, EMULSION INTRAVENOUS at 16:19

## 2021-01-01 RX ADMIN — FAMOTIDINE 20 MG: 10 INJECTION, SOLUTION INTRAVENOUS at 10:47

## 2021-01-01 RX ADMIN — FENTANYL CITRATE 175 MCG/HR: 50 INJECTION, SOLUTION INTRAMUSCULAR; INTRAVENOUS at 01:40

## 2021-01-01 RX ADMIN — FENTANYL CITRATE: 50 INJECTION, SOLUTION INTRAMUSCULAR; INTRAVENOUS at 18:48

## 2021-01-01 RX ADMIN — POTASSIUM BICARBONATE 20 MEQ: 782 TABLET, EFFERVESCENT ORAL at 09:09

## 2021-01-01 RX ADMIN — PROPOFOL 15 MCG/KG/MIN: 10 INJECTION, EMULSION INTRAVENOUS at 21:14

## 2021-01-01 RX ADMIN — PROPOFOL 30 MCG/KG/MIN: 10 INJECTION, EMULSION INTRAVENOUS at 18:46

## 2021-01-01 RX ADMIN — FENTANYL CITRATE 175 MCG/HR: 50 INJECTION, SOLUTION INTRAMUSCULAR; INTRAVENOUS at 20:53

## 2021-01-01 RX ADMIN — ASPIRIN 324 MG: 81 TABLET, CHEWABLE ORAL at 08:11

## 2021-01-01 RX ADMIN — FENTANYL CITRATE 175 MCG/HR: 50 INJECTION, SOLUTION INTRAMUSCULAR; INTRAVENOUS at 04:12

## 2021-01-01 RX ADMIN — PIPERACILLIN AND TAZOBACTAM 3375 MG: 3; .375 INJECTION, POWDER, FOR SOLUTION INTRAVENOUS at 20:07

## 2021-01-01 RX ADMIN — INSULIN LISPRO 9 UNITS: 100 INJECTION, SOLUTION INTRAVENOUS; SUBCUTANEOUS at 12:35

## 2021-01-01 RX ADMIN — INSULIN GLARGINE 35 UNITS: 100 INJECTION, SOLUTION SUBCUTANEOUS at 20:36

## 2021-01-01 RX ADMIN — HYDRALAZINE HYDROCHLORIDE 10 MG: 20 INJECTION INTRAMUSCULAR; INTRAVENOUS at 06:02

## 2021-01-01 RX ADMIN — FAMOTIDINE 20 MG: 10 INJECTION, SOLUTION INTRAVENOUS at 21:09

## 2021-01-01 RX ADMIN — CHLORHEXIDINE GLUCONATE 15 ML: 1.2 RINSE ORAL at 07:44

## 2021-01-01 RX ADMIN — INSULIN LISPRO 9 UNITS: 100 INJECTION, SOLUTION INTRAVENOUS; SUBCUTANEOUS at 11:45

## 2021-01-01 RX ADMIN — FENTANYL CITRATE 175 MCG/HR: 50 INJECTION, SOLUTION INTRAMUSCULAR; INTRAVENOUS at 01:31

## 2021-01-01 RX ADMIN — FUROSEMIDE 15 MG/HR: 10 INJECTION, SOLUTION INTRAVENOUS at 12:32

## 2021-01-01 RX ADMIN — PROPOFOL 25 MCG/KG/MIN: 10 INJECTION, EMULSION INTRAVENOUS at 22:56

## 2021-01-01 RX ADMIN — PROPOFOL 20 MCG/KG/MIN: 10 INJECTION, EMULSION INTRAVENOUS at 20:00

## 2021-01-01 RX ADMIN — ASPIRIN 300 MG: 300 SUPPOSITORY RECTAL at 08:32

## 2021-01-01 RX ADMIN — SODIUM CHLORIDE, PRESERVATIVE FREE 10 ML: 5 INJECTION INTRAVENOUS at 08:12

## 2021-01-01 RX ADMIN — HEPARIN SODIUM AND DEXTROSE 18 UNITS/KG/HR: 10000; 5 INJECTION INTRAVENOUS at 04:43

## 2021-01-01 RX ADMIN — FENTANYL CITRATE 175 MCG/HR: 50 INJECTION, SOLUTION INTRAMUSCULAR; INTRAVENOUS at 07:08

## 2021-01-01 RX ADMIN — INSULIN LISPRO 12 UNITS: 100 INJECTION, SOLUTION INTRAVENOUS; SUBCUTANEOUS at 12:22

## 2021-01-01 RX ADMIN — CHLORHEXIDINE GLUCONATE 15 ML: 1.2 RINSE ORAL at 21:25

## 2021-01-01 RX ADMIN — PROPOFOL 25 MCG/KG/MIN: 10 INJECTION, EMULSION INTRAVENOUS at 20:46

## 2021-01-01 RX ADMIN — ISOSORBIDE DINITRATE 20 MG: 10 TABLET ORAL at 08:38

## 2021-01-01 RX ADMIN — PROPOFOL 50 MCG/KG/MIN: 10 INJECTION, EMULSION INTRAVENOUS at 10:41

## 2021-01-01 RX ADMIN — PROPOFOL 45 MCG/KG/MIN: 10 INJECTION, EMULSION INTRAVENOUS at 19:47

## 2021-01-01 RX ADMIN — PROPOFOL 50 MCG/KG/MIN: 10 INJECTION, EMULSION INTRAVENOUS at 22:34

## 2021-01-01 RX ADMIN — PROPOFOL 40 MCG/KG/MIN: 10 INJECTION, EMULSION INTRAVENOUS at 03:34

## 2021-01-01 RX ADMIN — PIPERACILLIN AND TAZOBACTAM 3375 MG: 3; .375 INJECTION, POWDER, FOR SOLUTION INTRAVENOUS at 11:59

## 2021-01-01 RX ADMIN — INSULIN GLARGINE 35 UNITS: 100 INJECTION, SOLUTION SUBCUTANEOUS at 08:23

## 2021-01-01 RX ADMIN — SODIUM CHLORIDE, PRESERVATIVE FREE 10 ML: 5 INJECTION INTRAVENOUS at 08:45

## 2021-01-01 RX ADMIN — SODIUM CHLORIDE, PRESERVATIVE FREE 10 ML: 5 INJECTION INTRAVENOUS at 20:25

## 2021-01-01 RX ADMIN — CHLORHEXIDINE GLUCONATE 15 ML: 1.2 RINSE ORAL at 20:36

## 2021-01-01 RX ADMIN — PROPOFOL 30 MCG/KG/MIN: 10 INJECTION, EMULSION INTRAVENOUS at 23:01

## 2021-01-01 RX ADMIN — CHLORHEXIDINE GLUCONATE 15 ML: 1.2 RINSE ORAL at 21:38

## 2021-01-01 RX ADMIN — PIPERACILLIN AND TAZOBACTAM 3375 MG: 3; .375 INJECTION, POWDER, FOR SOLUTION INTRAVENOUS at 09:13

## 2021-01-01 RX ADMIN — ISOSORBIDE DINITRATE 20 MG: 10 TABLET ORAL at 14:35

## 2021-01-01 RX ADMIN — PROPOFOL 40 MCG/KG/MIN: 10 INJECTION, EMULSION INTRAVENOUS at 11:11

## 2021-01-01 RX ADMIN — INSULIN LISPRO 9 UNITS: 100 INJECTION, SOLUTION INTRAVENOUS; SUBCUTANEOUS at 04:32

## 2021-01-01 RX ADMIN — Medication 4 MG: at 13:54

## 2021-01-01 RX ADMIN — PIPERACILLIN AND TAZOBACTAM 3375 MG: 3; .375 INJECTION, POWDER, FOR SOLUTION INTRAVENOUS at 18:48

## 2021-01-01 RX ADMIN — HYDRALAZINE HYDROCHLORIDE 25 MG: 25 TABLET, FILM COATED ORAL at 20:25

## 2021-01-01 RX ADMIN — ASPIRIN 300 MG: 300 SUPPOSITORY RECTAL at 08:45

## 2021-01-01 RX ADMIN — PROPOFOL 25 MCG/KG/MIN: 10 INJECTION, EMULSION INTRAVENOUS at 14:37

## 2021-01-01 RX ADMIN — SODIUM CHLORIDE, PRESERVATIVE FREE 10 ML: 5 INJECTION INTRAVENOUS at 08:26

## 2021-01-01 RX ADMIN — CHLORHEXIDINE GLUCONATE 15 ML: 1.2 RINSE ORAL at 08:25

## 2021-01-01 RX ADMIN — CHLORHEXIDINE GLUCONATE 15 ML: 1.2 RINSE ORAL at 08:35

## 2021-01-01 RX ADMIN — CHLORHEXIDINE GLUCONATE 15 ML: 1.2 RINSE ORAL at 08:33

## 2021-01-01 RX ADMIN — PROPOFOL 50 MCG/KG/MIN: 10 INJECTION, EMULSION INTRAVENOUS at 00:37

## 2021-01-01 RX ADMIN — FAMOTIDINE 20 MG: 10 INJECTION, SOLUTION INTRAVENOUS at 07:32

## 2021-01-01 RX ADMIN — HEPARIN SODIUM 5000 UNITS: 5000 INJECTION INTRAVENOUS; SUBCUTANEOUS at 00:20

## 2021-01-01 RX ADMIN — INSULIN LISPRO 12 UNITS: 100 INJECTION, SOLUTION INTRAVENOUS; SUBCUTANEOUS at 11:27

## 2021-01-01 RX ADMIN — PROPOFOL 40 MCG/KG/MIN: 10 INJECTION, EMULSION INTRAVENOUS at 00:21

## 2021-01-01 RX ADMIN — PIPERACILLIN AND TAZOBACTAM 3375 MG: 3; .375 INJECTION, POWDER, LYOPHILIZED, FOR SOLUTION INTRAVENOUS at 15:19

## 2021-01-01 RX ADMIN — CHLOROTHIAZIDE SODIUM 500 MG: 500 INJECTION, POWDER, LYOPHILIZED, FOR SOLUTION INTRAVENOUS at 12:29

## 2021-01-01 RX ADMIN — LACTULOSE 20 G: 20 SOLUTION ORAL at 14:00

## 2021-01-01 RX ADMIN — FENTANYL CITRATE 175 MCG/HR: 50 INJECTION, SOLUTION INTRAMUSCULAR; INTRAVENOUS at 14:36

## 2021-01-01 RX ADMIN — HEPARIN SODIUM 5000 UNITS: 5000 INJECTION INTRAVENOUS; SUBCUTANEOUS at 08:39

## 2021-01-01 RX ADMIN — CHLOROTHIAZIDE SODIUM 500 MG: 500 INJECTION, POWDER, LYOPHILIZED, FOR SOLUTION INTRAVENOUS at 00:11

## 2021-01-01 RX ADMIN — INSULIN LISPRO 9 UNITS: 100 INJECTION, SOLUTION INTRAVENOUS; SUBCUTANEOUS at 23:46

## 2021-01-01 RX ADMIN — FUROSEMIDE 10 MG/HR: 10 INJECTION, SOLUTION INTRAMUSCULAR; INTRAVENOUS at 22:44

## 2021-01-01 RX ADMIN — FAMOTIDINE 20 MG: 10 INJECTION, SOLUTION INTRAVENOUS at 09:09

## 2021-01-01 RX ADMIN — INSULIN LISPRO 9 UNITS: 100 INJECTION, SOLUTION INTRAVENOUS; SUBCUTANEOUS at 00:11

## 2021-01-01 RX ADMIN — HEPARIN SODIUM AND DEXTROSE 18 UNITS/KG/HR: 10000; 5 INJECTION INTRAVENOUS at 19:58

## 2021-01-01 RX ADMIN — FUROSEMIDE 10 MG/HR: 10 INJECTION, SOLUTION INTRAMUSCULAR; INTRAVENOUS at 18:56

## 2021-01-01 RX ADMIN — FENTANYL CITRATE 175 MCG/HR: 50 INJECTION, SOLUTION INTRAMUSCULAR; INTRAVENOUS at 09:24

## 2021-01-01 RX ADMIN — INSULIN LISPRO 6 UNITS: 100 INJECTION, SOLUTION INTRAVENOUS; SUBCUTANEOUS at 16:22

## 2021-01-01 RX ADMIN — POTASSIUM BICARBONATE 40 MEQ: 782 TABLET, EFFERVESCENT ORAL at 11:30

## 2021-01-01 RX ADMIN — SODIUM CHLORIDE, SODIUM LACTATE, POTASSIUM CHLORIDE, CALCIUM CHLORIDE AND DEXTROSE MONOHYDRATE: 5; 600; 310; 30; 20 INJECTION, SOLUTION INTRAVENOUS at 00:08

## 2021-01-01 RX ADMIN — PROPOFOL 25 MCG/KG/MIN: 10 INJECTION, EMULSION INTRAVENOUS at 01:32

## 2021-01-01 RX ADMIN — HEPARIN SODIUM AND DEXTROSE 18 UNITS/KG/HR: 10000; 5 INJECTION INTRAVENOUS at 15:48

## 2021-01-01 RX ADMIN — FUROSEMIDE 15 MG/HR: 10 INJECTION, SOLUTION INTRAMUSCULAR; INTRAVENOUS at 21:43

## 2021-01-01 RX ADMIN — INSULIN LISPRO 12 UNITS: 100 INJECTION, SOLUTION INTRAVENOUS; SUBCUTANEOUS at 22:15

## 2021-01-01 RX ADMIN — HEPARIN SODIUM 5000 UNITS: 5000 INJECTION INTRAVENOUS; SUBCUTANEOUS at 14:25

## 2021-01-01 RX ADMIN — INSULIN GLARGINE 35 UNITS: 100 INJECTION, SOLUTION SUBCUTANEOUS at 10:06

## 2021-01-01 RX ADMIN — FENTANYL CITRATE 175 MCG/HR: 50 INJECTION, SOLUTION INTRAMUSCULAR; INTRAVENOUS at 08:36

## 2021-01-01 RX ADMIN — ASPIRIN 324 MG: 81 TABLET, CHEWABLE ORAL at 08:38

## 2021-01-01 RX ADMIN — HEPARIN SODIUM 5000 UNITS: 5000 INJECTION INTRAVENOUS; SUBCUTANEOUS at 21:30

## 2021-01-01 RX ADMIN — ENOXAPARIN SODIUM 150 MG: 150 INJECTION SUBCUTANEOUS at 08:33

## 2021-01-01 RX ADMIN — PROPOFOL 40 MCG/KG/MIN: 10 INJECTION, EMULSION INTRAVENOUS at 10:34

## 2021-01-01 RX ADMIN — INSULIN GLARGINE 25 UNITS: 100 INJECTION, SOLUTION SUBCUTANEOUS at 20:01

## 2021-01-01 RX ADMIN — SODIUM CHLORIDE, SODIUM LACTATE, POTASSIUM CHLORIDE, CALCIUM CHLORIDE AND DEXTROSE MONOHYDRATE: 5; 600; 310; 30; 20 INJECTION, SOLUTION INTRAVENOUS at 10:02

## 2021-01-01 RX ADMIN — PROPOFOL 30 MCG/KG/MIN: 10 INJECTION, EMULSION INTRAVENOUS at 15:54

## 2021-01-01 RX ADMIN — ASPIRIN 300 MG: 300 SUPPOSITORY RECTAL at 20:10

## 2021-01-01 RX ADMIN — HEPARIN SODIUM 5000 UNITS: 5000 INJECTION INTRAVENOUS; SUBCUTANEOUS at 09:10

## 2021-01-01 RX ADMIN — HYDRALAZINE HYDROCHLORIDE 50 MG: 50 TABLET, FILM COATED ORAL at 22:20

## 2021-01-01 RX ADMIN — CHLOROTHIAZIDE SODIUM 500 MG: 500 INJECTION, POWDER, LYOPHILIZED, FOR SOLUTION INTRAVENOUS at 01:26

## 2021-01-01 RX ADMIN — HYDRALAZINE HYDROCHLORIDE 50 MG: 50 TABLET, FILM COATED ORAL at 10:35

## 2021-01-01 RX ADMIN — ATROPINE SULFATE 0.5 MG: 1 INJECTION, SOLUTION INTRAMUSCULAR; INTRAVENOUS; SUBCUTANEOUS at 12:48

## 2021-01-01 RX ADMIN — ISOSORBIDE MONONITRATE 60 MG: 30 TABLET, EXTENDED RELEASE ORAL at 10:35

## 2021-01-01 RX ADMIN — ROSUVASTATIN CALCIUM 10 MG: 10 TABLET, FILM COATED ORAL at 20:36

## 2021-01-01 RX ADMIN — HEPARIN SODIUM 5000 UNITS: 5000 INJECTION INTRAVENOUS; SUBCUTANEOUS at 00:12

## 2021-01-01 RX ADMIN — SODIUM CHLORIDE, PRESERVATIVE FREE 10 ML: 5 INJECTION INTRAVENOUS at 08:04

## 2021-01-01 RX ADMIN — POLYETHYLENE GLYCOL 3350 17 G: 17 POWDER, FOR SOLUTION ORAL at 09:09

## 2021-01-01 RX ADMIN — PROPOFOL 40 MCG/KG/MIN: 10 INJECTION, EMULSION INTRAVENOUS at 08:35

## 2021-01-01 RX ADMIN — ASPIRIN 300 MG: 300 SUPPOSITORY RECTAL at 08:25

## 2021-01-01 RX ADMIN — PROPOFOL 30 MCG/KG/MIN: 10 INJECTION, EMULSION INTRAVENOUS at 06:29

## 2021-01-01 RX ADMIN — PROPOFOL 50 MCG/KG/MIN: 10 INJECTION, EMULSION INTRAVENOUS at 00:11

## 2021-01-01 RX ADMIN — MEROPENEM 500 MG: 500 INJECTION, POWDER, FOR SOLUTION INTRAVENOUS at 12:35

## 2021-01-01 RX ADMIN — PROPOFOL 40 MCG/KG/MIN: 10 INJECTION, EMULSION INTRAVENOUS at 06:13

## 2021-01-01 RX ADMIN — PROPOFOL 30 MCG/KG/MIN: 10 INJECTION, EMULSION INTRAVENOUS at 21:35

## 2021-01-01 RX ADMIN — ROSUVASTATIN CALCIUM 10 MG: 10 TABLET, FILM COATED ORAL at 21:25

## 2021-01-01 RX ADMIN — POLYETHYLENE GLYCOL 3350 17 G: 17 POWDER, FOR SOLUTION ORAL at 07:42

## 2021-01-01 RX ADMIN — DOCUSATE SODIUM 100 MG: 50 LIQUID ORAL at 22:19

## 2021-01-01 RX ADMIN — LACTULOSE 20 G: 20 SOLUTION ORAL at 09:14

## 2021-01-01 RX ADMIN — FAMOTIDINE 20 MG: 10 INJECTION, SOLUTION INTRAVENOUS at 08:35

## 2021-01-01 RX ADMIN — PROPOFOL 40 MCG/KG/MIN: 10 INJECTION, EMULSION INTRAVENOUS at 15:25

## 2021-01-01 RX ADMIN — SODIUM CHLORIDE, PRESERVATIVE FREE 10 ML: 5 INJECTION INTRAVENOUS at 12:11

## 2021-01-01 RX ADMIN — POTASSIUM BICARBONATE 20 MEQ: 782 TABLET, EFFERVESCENT ORAL at 09:02

## 2021-01-01 RX ADMIN — ISOSORBIDE DINITRATE 20 MG: 10 TABLET ORAL at 15:07

## 2021-01-01 RX ADMIN — INSULIN LISPRO 6 UNITS: 100 INJECTION, SOLUTION INTRAVENOUS; SUBCUTANEOUS at 15:29

## 2021-01-01 RX ADMIN — FAMOTIDINE 20 MG: 10 INJECTION, SOLUTION INTRAVENOUS at 07:55

## 2021-01-01 RX ADMIN — IOPAMIDOL 100 ML: 755 INJECTION, SOLUTION INTRAVENOUS at 14:19

## 2021-01-01 RX ADMIN — ISOSORBIDE DINITRATE 20 MG: 10 TABLET ORAL at 22:20

## 2021-01-01 RX ADMIN — SODIUM CHLORIDE, PRESERVATIVE FREE 10 ML: 5 INJECTION INTRAVENOUS at 20:04

## 2021-01-01 RX ADMIN — FUROSEMIDE 10 MG/HR: 10 INJECTION, SOLUTION INTRAMUSCULAR; INTRAVENOUS at 07:22

## 2021-01-01 RX ADMIN — FENTANYL CITRATE: 50 INJECTION, SOLUTION INTRAMUSCULAR; INTRAVENOUS at 11:46

## 2021-01-01 RX ADMIN — ROSUVASTATIN CALCIUM 10 MG: 10 TABLET, FILM COATED ORAL at 20:52

## 2021-01-01 RX ADMIN — SODIUM CHLORIDE, PRESERVATIVE FREE 10 ML: 5 INJECTION INTRAVENOUS at 09:13

## 2021-01-01 RX ADMIN — PROPOFOL 40 MCG/KG/MIN: 10 INJECTION, EMULSION INTRAVENOUS at 20:30

## 2021-01-01 RX ADMIN — CHLORHEXIDINE GLUCONATE 15 ML: 1.2 RINSE ORAL at 09:35

## 2021-01-01 RX ADMIN — FUROSEMIDE 10 MG/HR: 10 INJECTION, SOLUTION INTRAMUSCULAR; INTRAVENOUS at 14:03

## 2021-01-01 RX ADMIN — HYDRALAZINE HYDROCHLORIDE 50 MG: 50 TABLET, FILM COATED ORAL at 10:46

## 2021-01-01 RX ADMIN — ISOSORBIDE DINITRATE 20 MG: 10 TABLET ORAL at 21:49

## 2021-01-01 RX ADMIN — PROPOFOL 40 MCG/KG/MIN: 10 INJECTION, EMULSION INTRAVENOUS at 04:19

## 2021-01-01 RX ADMIN — PROPOFOL 40 MCG/KG/MIN: 10 INJECTION, EMULSION INTRAVENOUS at 03:50

## 2021-01-01 RX ADMIN — PROPOFOL 25 MCG/KG/MIN: 10 INJECTION, EMULSION INTRAVENOUS at 17:32

## 2021-01-01 RX ADMIN — PROPOFOL 40 MCG/KG/MIN: 10 INJECTION, EMULSION INTRAVENOUS at 17:39

## 2021-01-01 RX ADMIN — PROPOFOL 30 MCG/KG/MIN: 10 INJECTION, EMULSION INTRAVENOUS at 00:08

## 2021-01-01 RX ADMIN — PROPOFOL 30 MCG/KG/MIN: 10 INJECTION, EMULSION INTRAVENOUS at 04:46

## 2021-01-01 RX ADMIN — CHLORHEXIDINE GLUCONATE 15 ML: 1.2 RINSE ORAL at 20:46

## 2021-01-01 RX ADMIN — PROPOFOL 40 MCG/KG/MIN: 10 INJECTION, EMULSION INTRAVENOUS at 21:09

## 2021-01-01 RX ADMIN — ASPIRIN 324 MG: 81 TABLET, CHEWABLE ORAL at 07:43

## 2021-01-01 RX ADMIN — PROPOFOL 30 MCG/KG/MIN: 10 INJECTION, EMULSION INTRAVENOUS at 01:40

## 2021-01-01 RX ADMIN — PROPOFOL 18 MCG/KG/MIN: 10 INJECTION, EMULSION INTRAVENOUS at 09:10

## 2021-01-01 RX ADMIN — HEPARIN SODIUM 5000 UNITS: 5000 INJECTION INTRAVENOUS; SUBCUTANEOUS at 00:44

## 2021-01-01 RX ADMIN — CHLOROTHIAZIDE SODIUM 500 MG: 500 INJECTION, POWDER, LYOPHILIZED, FOR SOLUTION INTRAVENOUS at 11:37

## 2021-01-01 RX ADMIN — INSULIN LISPRO 12 UNITS: 100 INJECTION, SOLUTION INTRAVENOUS; SUBCUTANEOUS at 01:00

## 2021-01-01 RX ADMIN — HYDRALAZINE HYDROCHLORIDE 10 MG: 20 INJECTION INTRAMUSCULAR; INTRAVENOUS at 04:31

## 2021-01-01 RX ADMIN — LACTULOSE 20 G: 20 SOLUTION ORAL at 20:52

## 2021-01-01 RX ADMIN — HYDRALAZINE HYDROCHLORIDE 25 MG: 25 TABLET, FILM COATED ORAL at 09:10

## 2021-01-01 RX ADMIN — ROSUVASTATIN CALCIUM 10 MG: 10 TABLET, FILM COATED ORAL at 21:10

## 2021-01-01 RX ADMIN — HEPARIN SODIUM 5000 UNITS: 5000 INJECTION INTRAVENOUS; SUBCUTANEOUS at 14:49

## 2021-01-01 RX ADMIN — DEXTROSE MONOHYDRATE 12.5 G: 25 INJECTION, SOLUTION INTRAVENOUS at 12:44

## 2021-01-01 RX ADMIN — FENTANYL CITRATE 125 MCG/HR: 50 INJECTION, SOLUTION INTRAMUSCULAR; INTRAVENOUS at 17:19

## 2021-01-01 RX ADMIN — ISOSORBIDE DINITRATE 10 MG: 10 TABLET ORAL at 20:23

## 2021-01-01 RX ADMIN — FENTANYL CITRATE 175 MCG/HR: 50 INJECTION, SOLUTION INTRAMUSCULAR; INTRAVENOUS at 15:06

## 2021-01-01 RX ADMIN — VECURONIUM BROMIDE 10 MG: 10 INJECTION, POWDER, LYOPHILIZED, FOR SOLUTION INTRAVENOUS at 13:10

## 2021-01-01 RX ADMIN — ASPIRIN 324 MG: 81 TABLET, CHEWABLE ORAL at 08:35

## 2021-01-01 RX ADMIN — METOLAZONE 5 MG: 2.5 TABLET ORAL at 08:34

## 2021-01-01 RX ADMIN — INSULIN GLARGINE 35 UNITS: 100 INJECTION, SOLUTION SUBCUTANEOUS at 14:40

## 2021-01-01 RX ADMIN — SODIUM CHLORIDE, PRESERVATIVE FREE 10 ML: 5 INJECTION INTRAVENOUS at 21:44

## 2021-01-01 RX ADMIN — PROPOFOL 50 MCG/KG/MIN: 10 INJECTION, EMULSION INTRAVENOUS at 20:52

## 2021-01-01 RX ADMIN — FAMOTIDINE 20 MG: 10 INJECTION, SOLUTION INTRAVENOUS at 21:40

## 2021-01-01 RX ADMIN — FENTANYL CITRATE 175 MCG/HR: 50 INJECTION, SOLUTION INTRAMUSCULAR; INTRAVENOUS at 10:04

## 2021-01-01 RX ADMIN — PIPERACILLIN AND TAZOBACTAM 3375 MG: 3; .375 INJECTION, POWDER, FOR SOLUTION INTRAVENOUS at 03:16

## 2021-01-01 RX ADMIN — INSULIN LISPRO 12 UNITS: 100 INJECTION, SOLUTION INTRAVENOUS; SUBCUTANEOUS at 08:23

## 2021-01-01 RX ADMIN — PIPERACILLIN AND TAZOBACTAM 3375 MG: 3; .375 INJECTION, POWDER, LYOPHILIZED, FOR SOLUTION INTRAVENOUS at 23:50

## 2021-01-01 RX ADMIN — FAMOTIDINE 20 MG: 10 INJECTION, SOLUTION INTRAVENOUS at 21:10

## 2021-01-01 RX ADMIN — HYDRALAZINE HYDROCHLORIDE 10 MG: 20 INJECTION INTRAMUSCULAR; INTRAVENOUS at 08:44

## 2021-01-01 RX ADMIN — INSULIN LISPRO 12 UNITS: 100 INJECTION, SOLUTION INTRAVENOUS; SUBCUTANEOUS at 16:52

## 2021-01-01 RX ADMIN — ROSUVASTATIN CALCIUM 10 MG: 10 TABLET, FILM COATED ORAL at 21:37

## 2021-01-01 RX ADMIN — HYDRALAZINE HYDROCHLORIDE 25 MG: 25 TABLET, FILM COATED ORAL at 07:43

## 2021-01-01 RX ADMIN — ENOXAPARIN SODIUM 150 MG: 150 INJECTION SUBCUTANEOUS at 21:30

## 2021-01-01 RX ADMIN — INSULIN LISPRO 9 UNITS: 100 INJECTION, SOLUTION INTRAVENOUS; SUBCUTANEOUS at 08:36

## 2021-01-01 RX ADMIN — CHLORHEXIDINE GLUCONATE 15 ML: 1.2 RINSE ORAL at 20:09

## 2021-01-01 RX ADMIN — ISOSORBIDE DINITRATE 20 MG: 10 TABLET ORAL at 16:33

## 2021-01-01 RX ADMIN — CHLORHEXIDINE GLUCONATE 15 ML: 1.2 RINSE ORAL at 20:25

## 2021-01-01 RX ADMIN — CHLORHEXIDINE GLUCONATE 15 ML: 1.2 RINSE ORAL at 00:27

## 2021-01-01 RX ADMIN — DOCUSATE SODIUM 100 MG: 50 LIQUID ORAL at 20:52

## 2021-01-01 RX ADMIN — POLYETHYLENE GLYCOL 3350 17 G: 17 POWDER, FOR SOLUTION ORAL at 08:38

## 2021-01-01 RX ADMIN — ISOSORBIDE DINITRATE 20 MG: 10 TABLET ORAL at 14:36

## 2021-01-01 RX ADMIN — INSULIN GLARGINE 10 UNITS: 100 INJECTION, SOLUTION SUBCUTANEOUS at 09:01

## 2021-01-01 RX ADMIN — ASPIRIN 300 MG: 300 SUPPOSITORY RECTAL at 07:55

## 2021-01-01 RX ADMIN — PROPOFOL 50 MCG/KG/MIN: 10 INJECTION, EMULSION INTRAVENOUS at 04:00

## 2021-01-01 RX ADMIN — ASPIRIN 324 MG: 81 TABLET, CHEWABLE ORAL at 10:46

## 2021-01-01 RX ADMIN — FENTANYL CITRATE 125 MCG/HR: 50 INJECTION, SOLUTION INTRAMUSCULAR; INTRAVENOUS at 00:06

## 2021-01-01 RX ADMIN — FAMOTIDINE 20 MG: 10 INJECTION, SOLUTION INTRAVENOUS at 22:19

## 2021-01-01 RX ADMIN — PROPOFOL 40 MCG/KG/MIN: 10 INJECTION, EMULSION INTRAVENOUS at 06:21

## 2021-01-01 RX ADMIN — ISOSORBIDE DINITRATE 20 MG: 10 TABLET ORAL at 11:28

## 2021-01-01 RX ADMIN — FAMOTIDINE 20 MG: 10 INJECTION, SOLUTION INTRAVENOUS at 08:32

## 2021-01-01 RX ADMIN — CHLORHEXIDINE GLUCONATE 15 ML: 1.2 RINSE ORAL at 19:44

## 2021-01-01 RX ADMIN — PIPERACILLIN AND TAZOBACTAM 3375 MG: 3; .375 INJECTION, POWDER, LYOPHILIZED, FOR SOLUTION INTRAVENOUS at 00:21

## 2021-01-01 RX ADMIN — CHLORHEXIDINE GLUCONATE 15 ML: 1.2 RINSE ORAL at 08:44

## 2021-01-01 RX ADMIN — ENOXAPARIN SODIUM 150 MG: 150 INJECTION SUBCUTANEOUS at 20:10

## 2021-01-01 RX ADMIN — CHLORHEXIDINE GLUCONATE 15 ML: 1.2 RINSE ORAL at 07:32

## 2021-01-01 RX ADMIN — SODIUM CHLORIDE, PRESERVATIVE FREE 10 ML: 5 INJECTION INTRAVENOUS at 22:20

## 2021-01-01 RX ADMIN — FENTANYL CITRATE 175 MCG/HR: 50 INJECTION, SOLUTION INTRAMUSCULAR; INTRAVENOUS at 13:58

## 2021-01-01 RX ADMIN — CHLORHEXIDINE GLUCONATE 15 ML: 1.2 RINSE ORAL at 21:40

## 2021-01-01 RX ADMIN — METOLAZONE 10 MG: 2.5 TABLET ORAL at 08:18

## 2021-01-01 RX ADMIN — PROPOFOL 50 MCG/KG/MIN: 10 INJECTION, EMULSION INTRAVENOUS at 18:54

## 2021-01-01 RX ADMIN — POTASSIUM BICARBONATE 40 MEQ: 782 TABLET, EFFERVESCENT ORAL at 11:29

## 2021-01-01 RX ADMIN — PIPERACILLIN AND TAZOBACTAM 3375 MG: 3; .375 INJECTION, POWDER, LYOPHILIZED, FOR SOLUTION INTRAVENOUS at 07:10

## 2021-01-01 RX ADMIN — PROPOFOL 50 MCG/KG/MIN: 10 INJECTION, EMULSION INTRAVENOUS at 06:46

## 2021-01-01 RX ADMIN — PROPOFOL 50 MCG/KG/MIN: 10 INJECTION, EMULSION INTRAVENOUS at 14:45

## 2021-01-01 RX ADMIN — POTASSIUM BICARBONATE 20 MEQ: 782 TABLET, EFFERVESCENT ORAL at 20:46

## 2021-01-01 RX ADMIN — PROPOFOL 40 MCG/KG/MIN: 10 INJECTION, EMULSION INTRAVENOUS at 12:47

## 2021-01-01 RX ADMIN — PROPOFOL 20 MCG/KG/MIN: 10 INJECTION, EMULSION INTRAVENOUS at 23:42

## 2021-01-01 RX ADMIN — PIPERACILLIN AND TAZOBACTAM 3375 MG: 3; .375 INJECTION, POWDER, FOR SOLUTION INTRAVENOUS at 18:57

## 2021-01-01 RX ADMIN — PROPOFOL 20 MCG/KG/MIN: 10 INJECTION, EMULSION INTRAVENOUS at 04:15

## 2021-01-01 RX ADMIN — POLYETHYLENE GLYCOL 3350 17 G: 17 POWDER, FOR SOLUTION ORAL at 08:44

## 2021-01-01 RX ADMIN — FENTANYL CITRATE 175 MCG/HR: 50 INJECTION, SOLUTION INTRAMUSCULAR; INTRAVENOUS at 03:07

## 2021-01-01 RX ADMIN — FENTANYL CITRATE 175 MCG/HR: 50 INJECTION, SOLUTION INTRAMUSCULAR; INTRAVENOUS at 11:43

## 2021-01-01 RX ADMIN — CHLORHEXIDINE GLUCONATE 15 ML: 1.2 RINSE ORAL at 10:47

## 2021-01-01 RX ADMIN — PROPOFOL 25 MCG/KG/MIN: 10 INJECTION, EMULSION INTRAVENOUS at 10:53

## 2021-01-01 RX ADMIN — PROPOFOL 40 MCG/KG/MIN: 10 INJECTION, EMULSION INTRAVENOUS at 13:06

## 2021-01-01 RX ADMIN — HEPARIN SODIUM 5000 UNITS: 5000 INJECTION INTRAVENOUS; SUBCUTANEOUS at 22:33

## 2021-01-01 RX ADMIN — INSULIN GLARGINE 50 UNITS: 100 INJECTION, SOLUTION SUBCUTANEOUS at 19:56

## 2021-01-01 RX ADMIN — ROSUVASTATIN CALCIUM 10 MG: 10 TABLET, FILM COATED ORAL at 22:19

## 2021-01-01 RX ADMIN — DEXTROSE MONOHYDRATE 12.5 G: 25 INJECTION, SOLUTION INTRAVENOUS at 13:17

## 2021-01-01 RX ADMIN — PROPOFOL 50 MCG/KG/MIN: 10 INJECTION, EMULSION INTRAVENOUS at 09:10

## 2021-01-01 RX ADMIN — PROPOFOL 20 MCG/KG/MIN: 10 INJECTION, EMULSION INTRAVENOUS at 02:00

## 2021-01-01 RX ADMIN — INSULIN GLARGINE 25 UNITS: 100 INJECTION, SOLUTION SUBCUTANEOUS at 09:12

## 2021-01-01 RX ADMIN — ROSUVASTATIN CALCIUM 10 MG: 10 TABLET, FILM COATED ORAL at 20:25

## 2021-01-01 RX ADMIN — PIPERACILLIN AND TAZOBACTAM 3375 MG: 3; .375 INJECTION, POWDER, LYOPHILIZED, FOR SOLUTION INTRAVENOUS at 14:56

## 2021-01-01 RX ADMIN — HEPARIN SODIUM 5000 UNITS: 5000 INJECTION INTRAVENOUS; SUBCUTANEOUS at 05:12

## 2021-01-01 RX ADMIN — SODIUM CHLORIDE, PRESERVATIVE FREE 10 ML: 5 INJECTION INTRAVENOUS at 07:33

## 2021-01-01 RX ADMIN — INSULIN LISPRO 9 UNITS: 100 INJECTION, SOLUTION INTRAVENOUS; SUBCUTANEOUS at 17:33

## 2021-01-01 RX ADMIN — CHLORHEXIDINE GLUCONATE 15 ML: 1.2 RINSE ORAL at 08:11

## 2021-01-01 RX ADMIN — PROPOFOL 40 MCG/KG/MIN: 10 INJECTION, EMULSION INTRAVENOUS at 22:44

## 2021-01-01 RX ADMIN — INSULIN LISPRO 12 UNITS: 100 INJECTION, SOLUTION INTRAVENOUS; SUBCUTANEOUS at 16:32

## 2021-01-01 RX ADMIN — PROPOFOL 40 MCG/KG/MIN: 10 INJECTION, EMULSION INTRAVENOUS at 07:54

## 2021-01-01 RX ADMIN — SODIUM CHLORIDE, PRESERVATIVE FREE 10 ML: 5 INJECTION INTRAVENOUS at 19:46

## 2021-01-01 RX ADMIN — HEPARIN SODIUM 5000 UNITS: 5000 INJECTION INTRAVENOUS; SUBCUTANEOUS at 22:30

## 2021-01-01 RX ADMIN — HEPARIN SODIUM 5000 UNITS: 5000 INJECTION INTRAVENOUS; SUBCUTANEOUS at 13:58

## 2021-01-01 RX ADMIN — DOCUSATE SODIUM 100 MG: 50 LIQUID ORAL at 07:32

## 2021-01-01 RX ADMIN — CHLORHEXIDINE GLUCONATE 15 ML: 1.2 RINSE ORAL at 21:10

## 2021-01-01 RX ADMIN — PROPOFOL 30 MCG/KG/MIN: 10 INJECTION, EMULSION INTRAVENOUS at 09:30

## 2021-01-01 RX ADMIN — HYDRALAZINE HYDROCHLORIDE 25 MG: 25 TABLET, FILM COATED ORAL at 20:46

## 2021-01-01 RX ADMIN — FUROSEMIDE 10 MG/HR: 10 INJECTION, SOLUTION INTRAMUSCULAR; INTRAVENOUS at 06:20

## 2021-01-01 RX ADMIN — FAMOTIDINE 20 MG: 10 INJECTION, SOLUTION INTRAVENOUS at 08:44

## 2021-01-01 RX ADMIN — ASPIRIN 300 MG: 300 SUPPOSITORY RECTAL at 07:32

## 2021-01-01 RX ADMIN — PIPERACILLIN AND TAZOBACTAM 3375 MG: 3; .375 INJECTION, POWDER, LYOPHILIZED, FOR SOLUTION INTRAVENOUS at 22:53

## 2021-01-01 RX ADMIN — HEPARIN SODIUM 5000 UNITS: 5000 INJECTION INTRAVENOUS; SUBCUTANEOUS at 21:45

## 2021-01-01 RX ADMIN — PROPOFOL 50 MCG/KG/MIN: 10 INJECTION, EMULSION INTRAVENOUS at 01:48

## 2021-01-01 RX ADMIN — MEROPENEM 500 MG: 500 INJECTION, POWDER, FOR SOLUTION INTRAVENOUS at 23:59

## 2021-01-01 RX ADMIN — INSULIN LISPRO 6 UNITS: 100 INJECTION, SOLUTION INTRAVENOUS; SUBCUTANEOUS at 19:36

## 2021-01-01 RX ADMIN — FUROSEMIDE 10 MG/HR: 10 INJECTION, SOLUTION INTRAMUSCULAR; INTRAVENOUS at 22:53

## 2021-01-01 RX ADMIN — INSULIN LISPRO 12 UNITS: 100 INJECTION, SOLUTION INTRAVENOUS; SUBCUTANEOUS at 16:46

## 2021-01-01 RX ADMIN — VANCOMYCIN HYDROCHLORIDE 1750 MG: 1 INJECTION, POWDER, LYOPHILIZED, FOR SOLUTION INTRAVENOUS at 12:00

## 2021-01-01 RX ADMIN — PROPOFOL 25 MCG/KG/MIN: 10 INJECTION, EMULSION INTRAVENOUS at 05:11

## 2021-01-01 RX ADMIN — FAMOTIDINE 20 MG: 10 INJECTION, SOLUTION INTRAVENOUS at 08:38

## 2021-01-01 RX ADMIN — ROSUVASTATIN CALCIUM 10 MG: 10 TABLET, FILM COATED ORAL at 20:10

## 2021-01-01 RX ADMIN — POTASSIUM BICARBONATE 20 MEQ: 782 TABLET, EFFERVESCENT ORAL at 20:24

## 2021-01-01 RX ADMIN — HEPARIN SODIUM AND DEXTROSE 18 UNITS/KG/HR: 10000; 5 INJECTION INTRAVENOUS at 13:12

## 2021-01-01 RX ADMIN — HEPARIN SODIUM 5000 UNITS: 5000 INJECTION INTRAVENOUS; SUBCUTANEOUS at 15:26

## 2021-01-01 RX ADMIN — PROPOFOL 50 MCG/KG/MIN: 10 INJECTION, EMULSION INTRAVENOUS at 09:22

## 2021-01-01 RX ADMIN — PROPOFOL 30 MCG/KG/MIN: 10 INJECTION, EMULSION INTRAVENOUS at 17:38

## 2021-01-01 RX ADMIN — HYDRALAZINE HYDROCHLORIDE 25 MG: 25 TABLET, FILM COATED ORAL at 08:34

## 2021-01-01 RX ADMIN — INSULIN LISPRO 9 UNITS: 100 INJECTION, SOLUTION INTRAVENOUS; SUBCUTANEOUS at 19:57

## 2021-01-01 RX ADMIN — FUROSEMIDE 15 MG/HR: 10 INJECTION, SOLUTION INTRAMUSCULAR; INTRAVENOUS at 04:18

## 2021-01-01 RX ADMIN — HEPARIN SODIUM 5000 UNITS: 5000 INJECTION INTRAVENOUS; SUBCUTANEOUS at 16:33

## 2021-01-01 RX ADMIN — POTASSIUM CHLORIDE 20 MEQ: 1500 TABLET, EXTENDED RELEASE ORAL at 22:27

## 2021-01-01 RX ADMIN — FENTANYL CITRATE 125 MCG/HR: 50 INJECTION, SOLUTION INTRAMUSCULAR; INTRAVENOUS at 08:45

## 2021-01-01 RX ADMIN — INSULIN LISPRO 6 UNITS: 100 INJECTION, SOLUTION INTRAVENOUS; SUBCUTANEOUS at 04:00

## 2021-01-01 RX ADMIN — INSULIN LISPRO 9 UNITS: 100 INJECTION, SOLUTION INTRAVENOUS; SUBCUTANEOUS at 09:39

## 2021-01-01 RX ADMIN — PIPERACILLIN AND TAZOBACTAM 3375 MG: 3; .375 INJECTION, POWDER, FOR SOLUTION INTRAVENOUS at 03:18

## 2021-01-01 RX ADMIN — INSULIN LISPRO 9 UNITS: 100 INJECTION, SOLUTION INTRAVENOUS; SUBCUTANEOUS at 00:44

## 2021-01-01 RX ADMIN — FUROSEMIDE 10 MG/HR: 10 INJECTION, SOLUTION INTRAMUSCULAR; INTRAVENOUS at 13:58

## 2021-01-01 RX ADMIN — ISOSORBIDE DINITRATE 20 MG: 10 TABLET ORAL at 07:43

## 2021-01-01 RX ADMIN — PROPOFOL 35.73 MCG/KG/MIN: 10 INJECTION, EMULSION INTRAVENOUS at 08:45

## 2021-01-01 RX ADMIN — INSULIN LISPRO 9 UNITS: 100 INJECTION, SOLUTION INTRAVENOUS; SUBCUTANEOUS at 00:04

## 2021-01-01 RX ADMIN — FENTANYL CITRATE 175 MCG/HR: 50 INJECTION, SOLUTION INTRAMUSCULAR; INTRAVENOUS at 08:10

## 2021-01-01 RX ADMIN — SODIUM CHLORIDE, PRESERVATIVE FREE 10 ML: 5 INJECTION INTRAVENOUS at 21:37

## 2021-01-01 RX ADMIN — FENTANYL CITRATE 175 MCG/HR: 50 INJECTION, SOLUTION INTRAMUSCULAR; INTRAVENOUS at 00:11

## 2021-01-01 RX ADMIN — FENTANYL CITRATE 175 MCG/HR: 50 INJECTION, SOLUTION INTRAMUSCULAR; INTRAVENOUS at 00:07

## 2021-01-01 RX ADMIN — PROPOFOL 20 MCG/KG/MIN: 10 INJECTION, EMULSION INTRAVENOUS at 12:32

## 2021-01-01 RX ADMIN — PIPERACILLIN AND TAZOBACTAM 3375 MG: 3; .375 INJECTION, POWDER, LYOPHILIZED, FOR SOLUTION INTRAVENOUS at 07:02

## 2021-01-01 RX ADMIN — DEXTROSE AND SODIUM CHLORIDE: 5; 450 INJECTION, SOLUTION INTRAVENOUS at 20:30

## 2021-01-01 RX ADMIN — HEPARIN SODIUM 5000 UNITS: 5000 INJECTION INTRAVENOUS; SUBCUTANEOUS at 07:44

## 2021-01-01 RX ADMIN — PROPOFOL 30 MCG/KG/MIN: 10 INJECTION, EMULSION INTRAVENOUS at 13:00

## 2021-01-01 RX ADMIN — DOCUSATE SODIUM 100 MG: 50 LIQUID ORAL at 09:09

## 2021-01-01 RX ADMIN — ETOMIDATE 20 MG: 2 INJECTION INTRAVENOUS at 12:05

## 2021-01-01 RX ADMIN — HYDRALAZINE HYDROCHLORIDE 25 MG: 25 TABLET, FILM COATED ORAL at 08:38

## 2021-01-01 RX ADMIN — FENTANYL CITRATE 175 MCG/HR: 50 INJECTION, SOLUTION INTRAMUSCULAR; INTRAVENOUS at 12:22

## 2021-01-01 RX ADMIN — SODIUM CHLORIDE, PRESERVATIVE FREE 10 ML: 5 INJECTION INTRAVENOUS at 08:33

## 2021-01-01 RX ADMIN — CHLOROTHIAZIDE SODIUM 500 MG: 500 INJECTION, POWDER, LYOPHILIZED, FOR SOLUTION INTRAVENOUS at 22:45

## 2021-01-01 RX ADMIN — PIPERACILLIN AND TAZOBACTAM 3375 MG: 3; .375 INJECTION, POWDER, FOR SOLUTION INTRAVENOUS at 11:37

## 2021-01-01 RX ADMIN — LIDOCAINE HYDROCHLORIDE 5 ML: 20 INJECTION, SOLUTION INFILTRATION; PERINEURAL at 10:53

## 2021-01-01 RX ADMIN — PROPOFOL 25 MCG/KG/MIN: 10 INJECTION, EMULSION INTRAVENOUS at 00:45

## 2021-01-01 RX ADMIN — INSULIN GLARGINE 20 UNITS: 100 INJECTION, SOLUTION SUBCUTANEOUS at 12:23

## 2021-01-01 RX ADMIN — HEPARIN SODIUM 5000 UNITS: 5000 INJECTION INTRAVENOUS; SUBCUTANEOUS at 14:36

## 2021-01-01 RX ADMIN — PROPOFOL 30 MCG/KG/MIN: 10 INJECTION, EMULSION INTRAVENOUS at 12:45

## 2021-01-01 RX ADMIN — HEPARIN SODIUM 5000 UNITS: 5000 INJECTION INTRAVENOUS; SUBCUTANEOUS at 06:22

## 2021-01-01 RX ADMIN — CHLORHEXIDINE GLUCONATE 15 ML: 1.2 RINSE ORAL at 07:54

## 2021-01-01 RX ADMIN — FENTANYL CITRATE: 50 INJECTION, SOLUTION INTRAMUSCULAR; INTRAVENOUS at 16:12

## 2021-01-01 RX ADMIN — PIPERACILLIN AND TAZOBACTAM 3375 MG: 3; .375 INJECTION, POWDER, FOR SOLUTION INTRAVENOUS at 02:30

## 2021-01-01 RX ADMIN — SODIUM CHLORIDE, PRESERVATIVE FREE 10 ML: 5 INJECTION INTRAVENOUS at 20:50

## 2021-01-01 RX ADMIN — SODIUM CHLORIDE, PRESERVATIVE FREE 10 ML: 5 INJECTION INTRAVENOUS at 20:09

## 2021-01-01 RX ADMIN — FUROSEMIDE 10 MG/HR: 10 INJECTION, SOLUTION INTRAMUSCULAR; INTRAVENOUS at 17:10

## 2021-01-01 RX ADMIN — FUROSEMIDE 10 MG/HR: 10 INJECTION, SOLUTION INTRAMUSCULAR; INTRAVENOUS at 16:53

## 2021-01-01 RX ADMIN — PROPOFOL 50 MCG/KG/MIN: 10 INJECTION, EMULSION INTRAVENOUS at 04:51

## 2021-01-01 RX ADMIN — INSULIN LISPRO 6 UNITS: 100 INJECTION, SOLUTION INTRAVENOUS; SUBCUTANEOUS at 04:46

## 2021-01-01 RX ADMIN — PROPOFOL 50 MCG/KG/MIN: 10 INJECTION, EMULSION INTRAVENOUS at 07:03

## 2021-01-01 RX ADMIN — ISOSORBIDE DINITRATE 20 MG: 10 TABLET ORAL at 08:34

## 2021-01-01 RX ADMIN — PROPOFOL 20 MCG/KG/MIN: 10 INJECTION, EMULSION INTRAVENOUS at 06:46

## 2021-01-01 RX ADMIN — INSULIN LISPRO 12 UNITS: 100 INJECTION, SOLUTION INTRAVENOUS; SUBCUTANEOUS at 19:59

## 2021-01-01 RX ADMIN — SODIUM CHLORIDE 1 MCG/KG/HR: 9 INJECTION, SOLUTION INTRAVENOUS at 13:31

## 2021-01-01 RX ADMIN — CHLOROTHIAZIDE SODIUM 500 MG: 500 INJECTION, POWDER, LYOPHILIZED, FOR SOLUTION INTRAVENOUS at 12:28

## 2021-01-01 RX ADMIN — MIDAZOLAM 1 MG/HR: 5 INJECTION INTRAMUSCULAR; INTRAVENOUS at 17:02

## 2021-01-01 RX ADMIN — PIPERACILLIN AND TAZOBACTAM 3375 MG: 3; .375 INJECTION, POWDER, LYOPHILIZED, FOR SOLUTION INTRAVENOUS at 06:46

## 2021-01-01 RX ADMIN — ASPIRIN 324 MG: 81 TABLET, CHEWABLE ORAL at 09:09

## 2021-01-01 RX ADMIN — SODIUM CHLORIDE, PRESERVATIVE FREE 10 ML: 5 INJECTION INTRAVENOUS at 21:49

## 2021-01-01 RX ADMIN — PROPOFOL 40 MCG/KG/MIN: 10 INJECTION, EMULSION INTRAVENOUS at 00:45

## 2021-01-01 RX ADMIN — PROPOFOL 20 MCG/KG/MIN: 10 INJECTION, EMULSION INTRAVENOUS at 09:05

## 2021-01-01 RX ADMIN — INSULIN LISPRO 9 UNITS: 100 INJECTION, SOLUTION INTRAVENOUS; SUBCUTANEOUS at 03:44

## 2021-01-01 RX ADMIN — ROSUVASTATIN CALCIUM 10 MG: 10 TABLET, FILM COATED ORAL at 21:09

## 2021-01-01 RX ADMIN — POTASSIUM BICARBONATE 20 MEQ: 782 TABLET, EFFERVESCENT ORAL at 07:43

## 2021-01-01 RX ADMIN — DOCUSATE SODIUM 100 MG: 50 LIQUID ORAL at 21:40

## 2021-01-01 RX ADMIN — PROPOFOL 50 MCG/KG/MIN: 10 INJECTION, EMULSION INTRAVENOUS at 10:45

## 2021-01-01 RX ADMIN — PROPOFOL 25 MCG/KG/MIN: 10 INJECTION, EMULSION INTRAVENOUS at 08:23

## 2021-01-01 RX ADMIN — INSULIN LISPRO 9 UNITS: 100 INJECTION, SOLUTION INTRAVENOUS; SUBCUTANEOUS at 03:30

## 2021-01-01 RX ADMIN — PROPOFOL 50 MCG/KG/MIN: 10 INJECTION, EMULSION INTRAVENOUS at 12:43

## 2021-01-01 RX ADMIN — PIPERACILLIN AND TAZOBACTAM 3375 MG: 3; .375 INJECTION, POWDER, LYOPHILIZED, FOR SOLUTION INTRAVENOUS at 14:00

## 2021-01-01 RX ADMIN — INSULIN GLARGINE 35 UNITS: 100 INJECTION, SOLUTION SUBCUTANEOUS at 08:25

## 2021-01-01 RX ADMIN — HEPARIN SODIUM 5000 UNITS: 5000 INJECTION INTRAVENOUS; SUBCUTANEOUS at 05:45

## 2021-01-01 RX ADMIN — DOCUSATE SODIUM 100 MG: 50 LIQUID ORAL at 21:10

## 2021-01-01 RX ADMIN — FENTANYL CITRATE 25 MCG/HR: 50 INJECTION, SOLUTION INTRAMUSCULAR; INTRAVENOUS at 17:49

## 2021-01-01 RX ADMIN — PROPOFOL 40 MCG/KG/MIN: 10 INJECTION, EMULSION INTRAVENOUS at 23:29

## 2021-01-01 RX ADMIN — FUROSEMIDE 10 MG/HR: 10 INJECTION, SOLUTION INTRAMUSCULAR; INTRAVENOUS at 04:16

## 2021-01-01 RX ADMIN — HEPARIN SODIUM AND DEXTROSE 18 UNITS/KG/HR: 10000; 5 INJECTION INTRAVENOUS at 22:31

## 2021-01-01 RX ADMIN — FENTANYL CITRATE 175 MCG/HR: 50 INJECTION, SOLUTION INTRAMUSCULAR; INTRAVENOUS at 16:49

## 2021-01-01 RX ADMIN — CHLORHEXIDINE GLUCONATE 15 ML: 1.2 RINSE ORAL at 22:27

## 2021-01-01 RX ADMIN — HEPARIN SODIUM AND DEXTROSE 18 UNITS/KG/HR: 10000; 5 INJECTION INTRAVENOUS at 05:45

## 2021-01-01 RX ADMIN — PROPOFOL 50 MCG/KG/MIN: 10 INJECTION, EMULSION INTRAVENOUS at 14:58

## 2021-01-01 RX ADMIN — INSULIN LISPRO 9 UNITS: 100 INJECTION, SOLUTION INTRAVENOUS; SUBCUTANEOUS at 21:10

## 2021-01-01 RX ADMIN — INSULIN LISPRO 9 UNITS: 100 INJECTION, SOLUTION INTRAVENOUS; SUBCUTANEOUS at 20:34

## 2021-01-01 RX ADMIN — ROCURONIUM BROMIDE 50 MG: 10 INJECTION INTRAVENOUS at 12:05

## 2021-01-01 ASSESSMENT — PULMONARY FUNCTION TESTS
PIF_VALUE: 35
PIF_VALUE: 46
PIF_VALUE: 45
PIF_VALUE: 35
PIF_VALUE: 38
PIF_VALUE: 35
PIF_VALUE: 39
PIF_VALUE: 38
PIF_VALUE: 43
PIF_VALUE: 35
PIF_VALUE: 39
PIF_VALUE: 35
PIF_VALUE: 46
PIF_VALUE: 35
PIF_VALUE: 35
PIF_VALUE: 46
PIF_VALUE: 35
PIF_VALUE: 46
PIF_VALUE: 35
PIF_VALUE: 39
PIF_VALUE: 35
PIF_VALUE: 36
PIF_VALUE: 35
PIF_VALUE: 45
PIF_VALUE: 39
PIF_VALUE: 35
PIF_VALUE: 35
PIF_VALUE: 45
PIF_VALUE: 42
PIF_VALUE: 35
PIF_VALUE: 39
PIF_VALUE: 46
PIF_VALUE: 35
PIF_VALUE: 40
PIF_VALUE: 35
PIF_VALUE: 45
PIF_VALUE: 46
PIF_VALUE: 35
PIF_VALUE: 45
PIF_VALUE: 46
PIF_VALUE: 45
PIF_VALUE: 45
PIF_VALUE: 35
PIF_VALUE: 40
PIF_VALUE: 35
PIF_VALUE: 39
PIF_VALUE: 39
PIF_VALUE: 45
PIF_VALUE: 39
PIF_VALUE: 35
PIF_VALUE: 39
PIF_VALUE: 36
PIF_VALUE: 38
PIF_VALUE: 46
PIF_VALUE: 36
PIF_VALUE: 35
PIF_VALUE: 45
PIF_VALUE: 35
PIF_VALUE: 45
PIF_VALUE: 35
PIF_VALUE: 36
PIF_VALUE: 35
PIF_VALUE: 46
PIF_VALUE: 35
PIF_VALUE: 45
PIF_VALUE: 35
PIF_VALUE: 41
PIF_VALUE: 46
PIF_VALUE: 46
PIF_VALUE: 35
PIF_VALUE: 38
PIF_VALUE: 46
PIF_VALUE: 46
PIF_VALUE: 36
PIF_VALUE: 35
PIF_VALUE: 35
PIF_VALUE: 46
PIF_VALUE: 35
PIF_VALUE: 42
PIF_VALUE: 46
PIF_VALUE: 46
PIF_VALUE: 42
PIF_VALUE: 46
PIF_VALUE: 45
PIF_VALUE: 44
PIF_VALUE: 45
PIF_VALUE: 44
PIF_VALUE: 45
PIF_VALUE: 35
PIF_VALUE: 45
PIF_VALUE: 35
PIF_VALUE: 46
PIF_VALUE: 45
PIF_VALUE: 46
PIF_VALUE: 38
PIF_VALUE: 45
PIF_VALUE: 46
PIF_VALUE: 35
PIF_VALUE: 39
PIF_VALUE: 35
PIF_VALUE: 46
PIF_VALUE: 35
PIF_VALUE: 45
PIF_VALUE: 40
PIF_VALUE: 46
PIF_VALUE: 45
PIF_VALUE: 43
PIF_VALUE: 35
PIF_VALUE: 36
PIF_VALUE: 40
PIF_VALUE: 39
PIF_VALUE: 46
PIF_VALUE: 35
PIF_VALUE: 45
PIF_VALUE: 43
PIF_VALUE: 35
PIF_VALUE: 38
PIF_VALUE: 35
PIF_VALUE: 35
PIF_VALUE: 46
PIF_VALUE: 39
PIF_VALUE: 45
PIF_VALUE: 35
PIF_VALUE: 46
PIF_VALUE: 36
PIF_VALUE: 35
PIF_VALUE: 44
PIF_VALUE: 35
PIF_VALUE: 44
PIF_VALUE: 46
PIF_VALUE: 46
PIF_VALUE: 35
PIF_VALUE: 45
PIF_VALUE: 42
PIF_VALUE: 41
PIF_VALUE: 35
PIF_VALUE: 45
PIF_VALUE: 39
PIF_VALUE: 36
PIF_VALUE: 35
PIF_VALUE: 35
PIF_VALUE: 36
PIF_VALUE: 35
PIF_VALUE: 46
PIF_VALUE: 35
PIF_VALUE: 46
PIF_VALUE: 35
PIF_VALUE: 39
PIF_VALUE: 35
PIF_VALUE: 46
PIF_VALUE: 40
PIF_VALUE: 35
PIF_VALUE: 35
PIF_VALUE: 41
PIF_VALUE: 35
PIF_VALUE: 36
PIF_VALUE: 35
PIF_VALUE: 46
PIF_VALUE: 35
PIF_VALUE: 45
PIF_VALUE: 35
PIF_VALUE: 35
PIF_VALUE: 38
PIF_VALUE: 35
PIF_VALUE: 46
PIF_VALUE: 35
PIF_VALUE: 40
PIF_VALUE: 41
PIF_VALUE: 35
PIF_VALUE: 35
PIF_VALUE: 41
PIF_VALUE: 36
PIF_VALUE: 35
PIF_VALUE: 35
PIF_VALUE: 46
PIF_VALUE: 46
PIF_VALUE: 35
PIF_VALUE: 38
PIF_VALUE: 43
PIF_VALUE: 35
PIF_VALUE: 42
PIF_VALUE: 35
PIF_VALUE: 45
PIF_VALUE: 36
PIF_VALUE: 45
PIF_VALUE: 45
PIF_VALUE: 35
PIF_VALUE: 43
PIF_VALUE: 38
PIF_VALUE: 46
PIF_VALUE: 35
PIF_VALUE: 40
PIF_VALUE: 35
PIF_VALUE: 35
PIF_VALUE: 39
PIF_VALUE: 36
PIF_VALUE: 43
PIF_VALUE: 40
PIF_VALUE: 46
PIF_VALUE: 41
PIF_VALUE: 38
PIF_VALUE: 35
PIF_VALUE: 36
PIF_VALUE: 41
PIF_VALUE: 45
PIF_VALUE: 46
PIF_VALUE: 38
PIF_VALUE: 38
PIF_VALUE: 35
PIF_VALUE: 35
PIF_VALUE: 46
PIF_VALUE: 36
PIF_VALUE: 35
PIF_VALUE: 41
PIF_VALUE: 35
PIF_VALUE: 45
PIF_VALUE: 46
PIF_VALUE: 35
PIF_VALUE: 45
PIF_VALUE: 35
PIF_VALUE: 35
PIF_VALUE: 45
PIF_VALUE: 35
PIF_VALUE: 41
PIF_VALUE: 45
PIF_VALUE: 45
PIF_VALUE: 43
PIF_VALUE: 46
PIF_VALUE: 39
PIF_VALUE: 40
PIF_VALUE: 42
PIF_VALUE: 35
PIF_VALUE: 35
PIF_VALUE: 46
PIF_VALUE: 46
PIF_VALUE: 45
PIF_VALUE: 37
PIF_VALUE: 35
PIF_VALUE: 35
PIF_VALUE: 36
PIF_VALUE: 40
PIF_VALUE: 36
PIF_VALUE: 36
PIF_VALUE: 42
PIF_VALUE: 46
PIF_VALUE: 35
PIF_VALUE: 45
PIF_VALUE: 42
PIF_VALUE: 46
PIF_VALUE: 35
PIF_VALUE: 41
PIF_VALUE: 35
PIF_VALUE: 36
PIF_VALUE: 38
PIF_VALUE: 35
PIF_VALUE: 36
PIF_VALUE: 35
PIF_VALUE: 41
PIF_VALUE: 39
PIF_VALUE: 35
PIF_VALUE: 35
PIF_VALUE: 45
PIF_VALUE: 45
PIF_VALUE: 41
PIF_VALUE: 35
PIF_VALUE: 46
PIF_VALUE: 36
PIF_VALUE: 35
PIF_VALUE: 36
PIF_VALUE: 46
PIF_VALUE: 36
PIF_VALUE: 36
PIF_VALUE: 35
PIF_VALUE: 36
PIF_VALUE: 35
PIF_VALUE: 45
PIF_VALUE: 39
PIF_VALUE: 45
PIF_VALUE: 39
PIF_VALUE: 35
PIF_VALUE: 39
PIF_VALUE: 35
PIF_VALUE: 44
PIF_VALUE: 35
PIF_VALUE: 35
PIF_VALUE: 40
PIF_VALUE: 35
PIF_VALUE: 40
PIF_VALUE: 35
PIF_VALUE: 46
PIF_VALUE: 35
PIF_VALUE: 35
PIF_VALUE: 41
PIF_VALUE: 35
PIF_VALUE: 46
PIF_VALUE: 40
PIF_VALUE: 38
PIF_VALUE: 43
PIF_VALUE: 35
PIF_VALUE: 39
PIF_VALUE: 35
PIF_VALUE: 35
PIF_VALUE: 44
PIF_VALUE: 44
PIF_VALUE: 35
PIF_VALUE: 35
PIF_VALUE: 39
PIF_VALUE: 39
PIF_VALUE: 35
PIF_VALUE: 46
PIF_VALUE: 35
PIF_VALUE: 35
PIF_VALUE: 46
PIF_VALUE: 35
PIF_VALUE: 46
PIF_VALUE: 44
PIF_VALUE: 46
PIF_VALUE: 35
PIF_VALUE: 42
PIF_VALUE: 46
PIF_VALUE: 46
PIF_VALUE: 45
PIF_VALUE: 35
PIF_VALUE: 45
PIF_VALUE: 46
PIF_VALUE: 35
PIF_VALUE: 46
PIF_VALUE: 35
PIF_VALUE: 35
PIF_VALUE: 46
PIF_VALUE: 45
PIF_VALUE: 38
PIF_VALUE: 46
PIF_VALUE: 35
PIF_VALUE: 35
PIF_VALUE: 44
PIF_VALUE: 45
PIF_VALUE: 45
PIF_VALUE: 43
PIF_VALUE: 47
PIF_VALUE: 42
PIF_VALUE: 36
PIF_VALUE: 35
PIF_VALUE: 35
PIF_VALUE: 40
PIF_VALUE: 35
PIF_VALUE: 46
PIF_VALUE: 39
PIF_VALUE: 38
PIF_VALUE: 40
PIF_VALUE: 45
PIF_VALUE: 35
PIF_VALUE: 41
PIF_VALUE: 35
PIF_VALUE: 35
PIF_VALUE: 39
PIF_VALUE: 35
PIF_VALUE: 39
PIF_VALUE: 35
PIF_VALUE: 46
PIF_VALUE: 44
PIF_VALUE: 45
PIF_VALUE: 35

## 2021-01-01 ASSESSMENT — PAIN SCALES - GENERAL
PAINLEVEL_OUTOF10: 0
PAINLEVEL_OUTOF10: 10
PAINLEVEL_OUTOF10: 0

## 2021-01-01 ASSESSMENT — PAIN SCALES - WONG BAKER
WONGBAKER_NUMERICALRESPONSE: 4

## 2021-01-01 ASSESSMENT — ENCOUNTER SYMPTOMS
NAUSEA: 0
SORE THROAT: 0
SHORTNESS OF BREATH: 1
WHEEZING: 0
ABDOMINAL PAIN: 0
VOMITING: 0
COUGH: 0
CHEST TIGHTNESS: 0
EYE PAIN: 0

## 2021-06-02 PROBLEM — J96.01 ACUTE RESPIRATORY FAILURE WITH HYPOXIA (HCC): Status: ACTIVE | Noted: 2021-01-01

## 2021-06-02 NOTE — ED NOTES
Dr Rosa Coppola and evan marmolejo at bedside to intubate patient      Vincent Schulz RN  06/02/21 7440

## 2021-06-02 NOTE — ED NOTES
Still waiting for versed drip. Called 3x about missing bag. Told on 3rd call that they will have to make a new bag and send it.      Taylor Faulkner RN  06/02/21 1014

## 2021-06-02 NOTE — ED NOTES
Patient presents to the Er with complaints of SOB   Patient was brought by wife   Patient was helped out of car into a wheelchair prior to triage  Pt was complaining of SOB  Patient found to be 33% on Ra by the time he was wheeled back into room  Silver ENGLISH called to bedside  Respirations labored   Pale skin, lips blue tinged  History of COPD  Patients wife states that patient has been feeling SOB for 2 weeks but today he was worse than the other days prior   Placed on Non rebreather, patient was attempting to talk, then slumped over to right side and Silver english started to bag patient   Respiratory at bedside stat to intubate      Mike Gudino RN  06/02/21 2733

## 2021-06-02 NOTE — PROGRESS NOTES
17;30pm Received from ER via cart to CCU 9. Vented. VSS MP_SB Ev to CD.  Wife at bedside and updated on status  1800pm DR Hairston in and updated on status  18:30pm Dr Abisai Banegas in and updated on status

## 2021-06-02 NOTE — CONSULTS
Pulmonary and Critical Care Medicine  Consult Note  Encounter Date: 2021 6:38 PM    Mr. Sherlean Gosselin is a 65 y.o. male  : 1950  Requesting Provider: Viji Rolon MD    Reason for request: resp failure , ICU management             HISTORY OF PRESENT ILLNESS:    Patient is 65 y.o. presents with acute respiratory failure, patient currently intubated and sedated he is unable to provide history, he presented with 2-week history of worsening shortness of breath. Today symptoms were severe, he could not breathe, no reported cough, no fever, and no recorded chest pain. In ED he was bradycardic, 46 bpm, he was found to have left under control, currently on Lovenox, he saturation on arrival was 77%. Past Medical History:        Diagnosis Date    Hyperlipidemia     Type II or unspecified type diabetes mellitus without mention of complication, not stated as uncontrolled        Past Surgical History:        Procedure Laterality Date    CYSTOSCOPY  2017    FLEXIBLE W/COMPLEX CYSTOMETROGRAMEMG ANAL-COMPLEX UROFLOW-US PROSTATE    SHOULDER SURGERY      WRIST FUSION         Social History:     reports that he has quit smoking. He has never used smokeless tobacco. He reports previous alcohol use. He reports that he does not use drugs.     Family History:       Problem Relation Age of Onset    Prostate Cancer Father     Cancer Sister        Allergies:  Atorvastatin, Losartan, Pravastatin, and Simvastatin        MEDICATIONS during current hospitalization:    Continuous Infusions:   dexmedetomidine 1.2 mcg/kg/hr (21 1510)    sodium chloride      dextrose 5 % and 0.45 % NaCl      midazolam 1 mg/hr (21 1702)    propofol 30 mcg/kg/min (21 1738)    fentaNYL (SUBLIMAZE) infusion 25 mcg/hr (21 1749)       Scheduled Meds:   enoxaparin  1 mg/kg Subcutaneous Once    chlorhexidine  15 mL Mouth/Throat BID    famotidine (PEPCID) injection  20 mg Intravenous BID    sodium chloride flush  10 mL Intravenous 2 times per day    aspirin  300 mg Rectal Daily    [START ON 6/3/2021] enoxaparin  1 mg/kg Subcutaneous BID    propofol        rosuvastatin  10 mg Oral Nightly    piperacillin-tazobactam  3,375 mg Intravenous Q8H       PRN Meds:sodium chloride flush, sodium chloride, polyethylene glycol, acetaminophen **OR** acetaminophen, ondansetron **OR** ondansetron, nitroGLYCERIN, potassium chloride **OR** potassium alternative oral replacement **OR** potassium chloride, magnesium sulfate, ipratropium-albuterol        REVIEW OF SYSTEMS:   Not obtainable, currently on vent sedated  PHYSICAL EXAM:    Vitals:  /63   Pulse (!) 47   Temp 98.6 °F (37 °C) (Rectal)   Resp 18   Ht 6' 1\" (1.854 m)   Wt (!) 353 lb 6.4 oz (160.3 kg)   SpO2 94%   BMI 46.63 kg/m²     General: On vent, sedated unresponsive  Head: normocephalic, atraumatic  Eyes:No gross abnormalities. ENT:  MMM no lesions, ET and OG tube  Neck:  supple and no masses  Chest : Distant sounds, good air movement, bibasilar rales, no wheezing, nontender, tympanic  Heart[de-identified] Heart sounds are normal.  Regular rate and rhythm without murmur, gallop or rub. ABD:  symmetric, soft, no apparent tenderness, no guarding or rebound, has abdominal wall edema  Musculoskeletal : no cyanosis, no clubbing and 1 + edema-  bilateral lower extremity edema  Neuro: Sedated, unresponsive  Skin: No rashes or nodules noted.   Lymph node:  no cervical nodes  Urology: Yes Carreno   Psychiatric: unresponsive        Data Review  Recent Labs     06/02/21  1215 06/02/21  1236   WBC 14.7*  --    HGB 11.8* 14.3   HCT 38.4*  --      --       Recent Labs     06/02/21  1215 06/02/21  1236     --    K 4.0  --    CL 96  --    CO2 38*  --    BUN 43*  --    CREATININE 1.82* 1.7*   GLUCOSE 62*  --        MV Settings:     Vent Mode: AC/VC  Vt Ordered: 500 mL  Rate Set: 18 bmp  PEEP/CPAP: 14  Peak Inspiratory Pressure: 35 cmH2O  Mean Airway Pressure: 18 cmH20 I:E Ratio: 1:3    ABGs:   Recent Labs     06/02/21  1236   PHART 7.283*   LXP5YCX 80*   PO2ART 54*   EFO2OCE 37.6*   BEART 11*   [id number] 81*   IOR9FBP 40*     O2 Device: Ventilator  Lab Results   Component Value Date    LACTA 1.6 06/02/2021       Radiology  I personally reviewed imaging studies and CT chest shows bilateral atelectasis, infiltrate on the right, small bilateral pleural effusion        Assessment, plan: This is a critically ill patient at risk of deterioration / death , needing close ICU monitoring and intervention due to below noted problems     · Acute on chronic hypoxic and hypercapnic respiratory failure  · Likely sleep-related breathing disorder with acute exacerbation  · Volume overload  · And aspiration pneumonia  · Increased troponin, likely demand ischemia, patient has left bundle branch block rule out acute event  · Probable acute kidney injury likely prerenal      Recommendation  · Vent support lung protective strategy  · head of the bed 30°  · Daily sedation holidays and breathing trials  · Sedation with combination propofol and fentanyl target R ASS of 0 to -1  · Watch for ICU delirium: TV on, natural light, avoid benzos, pain control, early mobility, and family engagement  · PUD prophylaxis   · DVT prophylaxis, currently on Lovenox therapeutic dose  · Start tube feed  · Monitor blood sugar target 140180  · Respiratory culture  · Blood cultures  · Empiric Zosyn  · Continue Lasix and monitor renal function  · Watch volume status and avoid overload      Due to the immediate potential for life-threatening deterioration due to acute respiratory failure, I spent 36 minutes providing critical care. This time is excluding time spent performing procedures.            Thank you for consultation    Electronically signed by Tona Garcia MD, [facility],  on 6/2/2021 at 6:38 PM

## 2021-06-02 NOTE — ED PROVIDER NOTES
Psychiatric/Behavioral: Negative for confusion and sleep disturbance. Except as noted above the remainder of the review of systems was reviewed and negative. PAST MEDICAL HISTORY     Past Medical History:   Diagnosis Date    Hyperlipidemia     Type II or unspecified type diabetes mellitus without mention of complication, not stated as uncontrolled          SURGICAL HISTORY       Past Surgical History:   Procedure Laterality Date    CYSTOSCOPY  04/19/2017    FLEXIBLE W/COMPLEX CYSTOMETROGRAMEMG ANAL-COMPLEX UROFLOW-US PROSTATE    SHOULDER SURGERY      WRIST FUSION           CURRENT MEDICATIONS       Previous Medications    AMLODIPINE (NORVASC) 5 MG TABLET    Take 5 mg by mouth daily    ASPIRIN 81 MG TABLET    Take 81 mg by mouth daily. CARVEDILOL (COREG) 25 MG TABLET    Take 25 mg by mouth 2 times daily (with meals)    CHLORTHALIDONE (HYGROTEN) 50 MG TABLET    Take 50 mg by mouth daily    CONTINUOUS BLOOD GLUC  (FREESTYLE MARYANN 14 DAY READER) NIRU    As directed    CONTINUOUS BLOOD GLUC SENSOR (FREESTYLE MARYANN 14 DAY SENSOR) MISC    Every 2 weeks    FINASTERIDE (PROSCAR) 5 MG TABLET    Take 1 tablet by mouth daily. FINASTERIDE (PROSCAR) 5 MG TABLET    Take 1 tablet by mouth daily    INSULIN ASPART (NOVOLOG FLEXPEN) 100 UNIT/ML INJECTION PEN    40 units am 35 units lunch and 50 units    INSULIN GLARGINE (LANTUS SOLOSTAR) 100 UNIT/ML INJECTION PEN    60 units twice a day    OMEPRAZOLE (PRILOSEC) 20 MG CAPSULE    Take 20 mg by mouth daily.     OXYBUTYNIN (DITROPAN XL) 15 MG EXTENDED RELEASE TABLET    Take 15 mg by mouth daily    ROSUVASTATIN (CRESTOR) 10 MG TABLET    Take 10 mg by mouth daily    SAW PALMETTO, SERENOA REPENS, (SAW PALMETTO PO)    Take by mouth    VALSARTAN (DIOVAN) 320 MG TABLET    Take 320 mg by mouth daily    VITAMIN D 25 MCG (1000 UT) CAPS    Take by mouth       ALLERGIES     Atorvastatin, Losartan, Pravastatin, and Simvastatin    FAMILY HISTORY       Family History well-developed. He is obese. He is ill-appearing. He is not diaphoretic. HENT:      Head: Normocephalic and atraumatic. Right Ear: External ear normal.      Left Ear: External ear normal.      Mouth/Throat:      Pharynx: No oropharyngeal exudate. Eyes:      Conjunctiva/sclera: Conjunctivae normal.      Pupils: Pupils are equal, round, and reactive to light. Neck:      Thyroid: No thyromegaly. Vascular: No JVD. Trachea: No tracheal deviation. Cardiovascular:      Rate and Rhythm: Normal rate and regular rhythm. Heart sounds: Normal heart sounds. No murmur heard. Pulmonary:      Effort: Respiratory distress present. Breath sounds: Examination of the right-upper field reveals decreased breath sounds. Examination of the left-upper field reveals decreased breath sounds. Examination of the right-middle field reveals decreased breath sounds. Examination of the left-middle field reveals decreased breath sounds. Examination of the right-lower field reveals decreased breath sounds. Examination of the left-lower field reveals decreased breath sounds. Decreased breath sounds present. No wheezing, rhonchi or rales. Abdominal:      General: Bowel sounds are normal.      Palpations: Abdomen is soft. Tenderness: There is no abdominal tenderness. There is no guarding. Musculoskeletal:         General: Normal range of motion. Cervical back: Normal range of motion and neck supple. Right lower leg: Edema present. Left lower leg: Edema present. Skin:     General: Skin is warm and dry. Findings: No rash. Neurological:      Mental Status: He is alert. Cranial Nerves: No cranial nerve deficit.          DIAGNOSTIC RESULTS     EKG: All EKG's are interpreted by the Emergency Department Physician who either signs or Co-signs this chart in the absence of a cardiologist.    Normal sinus rhythm 78 bpm left bundle branch block    Second EKG on Precedex sinus bradycardia 46 bpm no acute ischemia    RADIOLOGY:   Non-plain film images such as CT, Ultrasound and MRI are read by the radiologist. Plain radiographic images are visualized and preliminarily interpreted by the emergency physician with the below findings:        Interpretation per the Radiologist below, if available at the time of this note:    CTA Chest W WO  (PE study)   Final Result      Limited study as described. No CT evidence of pulmonary embolism within main, right, and left pulmonary arteries, and bilateral interlobar arteries. Segmental and subsegmental pulmonary arteries cannot be assessed. Small bilateral pleural effusions, right greater than left. Bilateral lower lobe collapse with bilateral subsegmental atelectasis/pneumonia, mid and upper lungs. Cardiomegaly. Bilateral thyroid nodules. Nonemergent thyroid sonography may be obtained for further evaluation if clinical concern warrants to rule out malignancy. All CT scans at this facility use dose modulation, iterative reconstruction, and/or weight based dosing when appropriate to reduce radiation dose to as low as reasonably achievable. XR CHEST PORTABLE   Final Result   Status post intubation and NG tube placement. There is an area of consolidation in the left lung base. There is possible left pleural effusion.                   ED BEDSIDE ULTRASOUND:   Performed by ED Physician - none    LABS:  Labs Reviewed   CBC WITH AUTO DIFFERENTIAL - Abnormal; Notable for the following components:       Result Value    WBC 14.7 (*)     Hemoglobin 11.8 (*)     Hematocrit 38.4 (*)     MCV 76.7 (*)     MCH 23.6 (*)     MCHC 30.8 (*)     RDW 17.1 (*)     Neutrophils Absolute 11.3 (*)     All other components within normal limits   COMPREHENSIVE METABOLIC PANEL - Abnormal; Notable for the following components:    CO2 38 (*)     Glucose 62 (*)     BUN 43 (*)     CREATININE 1.82 (*)     GFR Non- 36.8 (*)     GFR  American 44.5 (*)     Globulin 3.6 (*)     All other components within normal limits   TROPONIN - Abnormal; Notable for the following components:    Troponin 0.029 (*)     All other components within normal limits    Narrative:     CALL  [place]  [facility] tel. [phone],  TROP results called to and read back by Oral Matute, 06/02/2021 13:18, by  Pipe Heath   D-DIMER, QUANTITATIVE - Abnormal; Notable for the following components:    D-Dimer, Quant 2.29 (*)     All other components within normal limits    Narrative:     CALL  [place]  [facility] tel. [phone],  D dimer results called to and read back by Mellisa Jorge, 06/02/2021 13:19, by  JOANA   POCT ARTERIAL - Abnormal; Notable for the following components:    POC Chloride 97 (*)     POC Glucose 49 (*)     POC Creatinine 1.7 (*)     GFR Non- 40 (*)     GFR  48 (*)     pH, Arterial 7.283 (*)     pCO2, Arterial 80 (*)     pO2, Arterial 54 (*)     HCO3, Arterial 37.6 (*)     Base Excess, Arterial 11 (*)     O2 Sat, Arterial 81 (*)     TCO2, Arterial 40 (*)     All other components within normal limits   COVID-19, RAPID   CULTURE, BLOOD 2   CULTURE, BLOOD 1   BRAIN NATRIURETIC PEPTIDE    Narrative:     CALL  [place]  [facility] tel. [phone],  TROP results called to and read back by Oral Matute, 06/02/2021 13:18, by  Pipe Heath   PROTIME-INR   LACTIC ACID, PLASMA   POCT GLUCOSE   POCT GLUCOSE       All other labs were within normal range or not returned as of this dictation. EMERGENCY DEPARTMENT COURSE and DIFFERENTIAL DIAGNOSIS/MDM:   Vitals:    Vitals:    06/02/21 1207 06/02/21 1230 06/02/21 1300 06/02/21 1330   BP:  (!) 149/77 (!) 165/86 (!) 156/67   Pulse: 92 79 77 67   Resp: 25 18 17 19   SpO2: 96% 97% 91% 91%   Weight:       Height:           Patient in acute respiratory failure hypoxic and hypercarbic. Chest x-ray shows cardiomegaly. CTA of the chest shows no PE but possible atelectasis versus infiltrate versus pleural effusions.   BNP is high

## 2021-06-03 NOTE — PLAN OF CARE
Problem: Non-Violent Restraints  Goal: Removal from restraints as soon as assessed to be safe  Outcome: Ongoing  Goal: No harm/injury to patient while restraints in use  Outcome: Ongoing  Goal: Patient's dignity will be maintained  Outcome: Ongoing     Problem: Skin Integrity:  Goal: Will show no infection signs and symptoms  Description: Will show no infection signs and symptoms  Outcome: Ongoing  Goal: Absence of new skin breakdown  Description: Absence of new skin breakdown  Outcome: Ongoing     Problem: Falls - Risk of:  Goal: Will remain free from falls  Description: Will remain free from falls  Outcome: Ongoing  Goal: Absence of physical injury  Description: Absence of physical injury  Outcome: Ongoing     Problem: Discharge Planning:  Goal: Participates in care planning  Description: Participates in care planning  Outcome: Ongoing  Goal: Discharged to appropriate level of care  Description: Discharged to appropriate level of care  Outcome: Ongoing     Problem: Anxiety/Stress:  Goal: Level of anxiety will decrease  Description: Level of anxiety will decrease  Outcome: Ongoing     Problem: Aspiration:  Goal: Absence of aspiration  Description: Absence of aspiration  Outcome: Ongoing     Problem:  Bowel Function - Altered:  Goal: Bowel elimination is within specified parameters  Description: Bowel elimination is within specified parameters  Outcome: Ongoing     Problem: Cardiac Output - Decreased:  Goal: Hemodynamic stability will improve  Description: Hemodynamic stability will improve  Outcome: Ongoing     Problem: Fluid Volume - Imbalance:  Goal: Absence of imbalanced fluid volume signs and symptoms  Description: Absence of imbalanced fluid volume signs and symptoms  Outcome: Ongoing     Problem: Gas Exchange - Impaired:  Goal: Levels of oxygenation will improve  Description: Levels of oxygenation will improve  Outcome: Ongoing     Problem: Mental Status - Impaired:  Goal: Mental status will be restored to

## 2021-06-03 NOTE — CARE COORDINATION
[facility] EMERGENCY [facility] Case Management Initial Discharge Assessment    Met with wife  to discuss discharge plan. PCP: Sabina Chase DO                                Date of Last Visit: [address] - Pt saw him 6 months ago     If no PCP, list provided? N/A    Discharge Planning    Living Arrangements: independently at home    Who do you live with? Wife     Who helps you with your care:  self or spouse    If lives at home:     Do you have any barriers navigating in your home? no    Patient can perform ADL? Yes    Current Services (outpatient and in home) :  None    Dialysis: No    Is transportation available to get to your appointments? Yes    DME Equipment:  yes - Robi Garay - Only uses cane PRN    Respiratory equipment: None    Respiratory provider:  no     [facility] with Medication Assistance Program?  No      Patient agreeable to [facility]? No  - Patients wife states he has never needed prior and she wants to wait and see how he is before agreeing to  Any post dc services. Patient agreeable to SNF/Rehab? No  - Pt may need and we will assess once we know prognosis and needs. Other discharge needs identified? Palliative Care    Does Patient Have a High-Risk for Readmission Diagnosis (CHF, PN, MI, COPD)? Yes    If Yes,     Consult with pulmonologist? Yes   Consult with cardiologist? Yes   Cardiac Rehab referral if EF <35%? Yes   Consult with Pharmacy for medication assessment prior to discharge? Yes   Consult with Behavioral health to aid in depression, anxiety, or coping issues? N/A   Palliative Care Consult? Yes   Pulmonary Rehab order for COPD, PN, and CHF (if EF > 35%)? N/A    Does patient have a reliable scale and know how to read it (for CHF)? Yes   Nutrition consult for CHF?  Yes   Respiratory therapy consult that includes bedside instruction on administration of nebulizers and/or inhalers, and assessment of oxygen and equipment needs in the home? Yes    Initial Discharge Plan? (Note: please see concurrent daily documentation for any updates after initial note). Spoke to patients wife as he is on vent at present and sedated. Pt has not ever had rehab/snf prior and she has no idea at this point for his needs. Pt may require trach but just admitted so we [place] follow to determine dc needs.      Readmission Risk              Risk of Unplanned Readmission:  22         Electronically signed by Latrell Bolanos on 6/3/2021 at 1:42 PM

## 2021-06-03 NOTE — PROGRESS NOTES
Pulmonary & Critical Care Medicine ICU Progress Note  Chief complaint : Acute respiratory failure    Subjunctive/24 hour events :   Patient seen and examined during multidisciplinary rounds with RN, charge nurse, RT, pharmacy, dietitian, and social service. Patient on vent, currently on 100% and 18 of PEEP, saturation 92 to 95%, sedated, no fever, he is bradycardic but blood pressure is okay, he is not on pressors, urine output 1200 cc, + bowel movement, tolerating tube feed, no vomiting      Social History     Tobacco Use    Smoking status: Former Smoker    Smokeless tobacco: Never Used   Substance Use Topics    Alcohol use: Not Currently         Problem Relation Age of Onset    Prostate Cancer Father     Cancer Sister        Recent Labs     06/02/21  1236 06/02/21  1919   PHART 7.283* 7.398   OZL1MKS 80* 57*   PO2ART 54* 72*       MV Settings:  Vent Mode: AC/VC Rate Set: 18 bmp/Vt Ordered: 500 mL/ /FiO2 : 100 %           IV:   dexmedetomidine Stopped (06/03/21 0801)    sodium chloride      dextrose 5 % and 0.45 % NaCl Stopped (06/02/21 2200)    propofol 15 mcg/kg/min (06/02/21 2114)    fentaNYL (SUBLIMAZE) infusion 25 mcg/hr (06/02/21 1749)    dextrose         Vitals:  /61   Pulse (!) 43   Temp 98.5 °F (36.9 °C) (Oral) Comment: 97.9 axillary  Resp 21   Ht 6' 1\" (1.854 m)   Wt (!) 353 lb 6.4 oz (160.3 kg)   SpO2 93%   BMI 46.63 kg/m²    Tmax:        Intake/Output Summary (Last 24 hours) at 6/3/2021 8846  Last data filed at 6/3/2021 0600  Gross per 24 hour   Intake 1527 ml   Output 1250 ml   Net 277 ml       EXAM:  General: On vent, sedated, unresponsive  Head: normocephalic, atraumatic  Eyes:No gross abnormalities. ENT:  MMM no lesions, ET and OG tube  Neck:  supple and no masses  Chest : Good air movement, distant breath sounds, bilateral rales, no wheezing, nontender, tympanic  Heart[de-identified] Heart sounds are normal.  Regular rate and rhythm without murmur, gallop or rub.   ABD:  symmetric, soft, non-tender, no apparent guarding or rebound  Musculoskeletal : no cyanosis, no clubbing and trace + edema-    Neuro:  Sedated, unresponsive  Skin: No rashes or nodules noted. Lymph node:  no cervical nodes  Urology: Yes Carreno   Psychiatric: Sedated and calm    Medications:  Scheduled Meds:   chlorhexidine  15 mL Mouth/Throat BID    famotidine (PEPCID) injection  20 mg Intravenous BID    sodium chloride flush  10 mL Intravenous 2 times per day    aspirin  300 mg Rectal Daily    enoxaparin  1 mg/kg Subcutaneous BID    rosuvastatin  10 mg Oral Nightly    piperacillin-tazobactam  3,375 mg Intravenous Q8H    insulin lispro  0-6 Units Subcutaneous Q4H       PRN Meds:  sodium chloride flush, sodium chloride, polyethylene glycol, acetaminophen **OR** acetaminophen, ondansetron **OR** ondansetron, nitroGLYCERIN, potassium chloride **OR** potassium alternative oral replacement **OR** potassium chloride, magnesium sulfate, ipratropium-albuterol, glucose, dextrose, glucagon (rDNA), dextrose    Results: reviewed by me   CBC:   Recent Labs     06/02/21  1215 06/02/21  1236 06/02/21 1919 06/03/21  0504   WBC 14.7*  --   --  9.7   HGB 11.8* 14.3 12.2* 10.9*   HCT 38.4*  --   --  35.4*   MCV 76.7*  --   --  76.0*     --   --  270     BMP:   Recent Labs     06/02/21  1215 06/02/21  1236 06/02/21 1919 06/03/21  0504     --   --  141   K 4.0  --   --  3.3*   CL 96  --   --  100   CO2 38*  --   --  32*   BUN 43*  --   --  49*   CREATININE 1.82* 1.7* 2.1* 2.24*     LIVER PROFILE:   Recent Labs     06/02/21  1215   AST 24   ALT 27   BILITOT 0.3   ALKPHOS 77     PT/INR:   Recent Labs     06/02/21  1215   PROTIME 14.8   INR 1.2     APTT: No results for input(s): APTT in the last 72 hours. UA:No results for input(s): NITRITE, COLORU, PHUR, LABCAST, WBCUA, RBCUA, MUCUS, TRICHOMONAS, YEAST, BACTERIA, CLARITYU, SPECGRAV, LEUKOCYTESUR, UROBILINOGEN, BILIRUBINUR, BLOODU, GLUCOSEU, AMORPHOUS in the last 72 hours.

## 2021-06-03 NOTE — PROGRESS NOTES
78 gm protein)  · Water Flushes: 100 ml every 4 hrs (600 ml)  · Current TF & Flush Orders Provides: with Glucerna 1.2 @ 20 ml/hr = 576 kcal (+ 807 kcal from propofol & Dextrose), 28 gm protein, ~985 ml free water  · Goal TF & Flush Orders Provides: with change to Vital HP @ 20 ml/hr + 3 Protein modulars = 792 kcal (+ 807 kcal from propofol & Dextrose), 120 gm protein, ~ 1000 ml free water    Anthropometric Measures:  · Height: 6' 1\" (185.4 cm)  · Current Body Weight: 353 lb (160.1 kg) (6/2)   · Admission Body Weight: 340 lb (154.2 kg) (estimated)    · Usual Body Weight: 338 lb (153.3 kg) (11/20/20-office visit)     · Ideal Body Weight: 184 lbs; % Ideal Body Weight  > 100%   · BMI: 46.6  · BMI Categories: Obese Class 3 (BMI 40.0 or greater)       Nutrition Diagnosis:   · Inadequate oral intake related to impaired respiratory function as evidenced by intubation    Nutrition Interventions:   Food and/or Nutrient Delivery:  Modify Tube Feeding (Change to Low Calorie High Protein TF @ 20 ml/hr x 24 hrs. Add 1 protein modular TID.  100 ml water flush every 4 hrs)  Nutrition Education/Counseling:  No recommendation at this time   Coordination of Nutrition Care:  Continue to monitor while inpatient    Goals:  Initiation and tolerance to TF, gluc 160-180, anticipate wt loss as edema resolves       Nutrition Monitoring and Evaluation:   Food/Nutrient Intake Outcomes:  Enteral Nutrition Intake/Tolerance, IVF Intake  Physical Signs/Symptoms Outcomes:  Biochemical Data, Weight, Fluid Status or Edema     Electronically signed by Dejon Serrano RD, LD on 6/3/21 at 2:09 PM EDT

## 2021-06-03 NOTE — PLAN OF CARE
Nutrition Problem #1: Inadequate oral intake  Intervention: Food and/or Nutrient Delivery: Modify Tube Feeding (Change to Low Calorie High Protein TF @ 20 ml/hr x 24 hrs. Add 1 protein modular TID.  100 ml water flush every 4 hrs)  Nutritional Goals: Initiation and tolerance to TF, gluc 160-180, anticipate wt loss as edema resolves

## 2021-06-03 NOTE — PROGRESS NOTES
Spiritual Care Services     Summary of Visit:      Spiritual Assessment/Intervention/Outcomes:    Encounter Summary  Services provided to[de-identified] Patient  Referral/Consult From[de-identified] Rounding  Support System: Spouse  Continue Visiting: Yes  Complexity of Encounter: Low  Length of Encounter: 15 minutes  Routine  Type: Initial  Assessment: Unable to respond (Patient intubated. No family present)  Intervention: Katharine Navarro presence/ Ministry of presence  Outcome: Receptive                          Values / Beliefs  Do you have any ethnic, cultural, sacramental, or spiritual Episcopal needs you would like us to be aware of while you are in the hospital?: No    Care Plan:        [id number] [address]   Electronically signed by Melissa Pro on 6/3/21 at 4:10 PM EDT     To reach a  for emotional and spiritual support, place an [facility] [place] consult request.   If a  is needed immediately, dial 0 and ask to page the on-call .

## 2021-06-03 NOTE — CONSULTS
Consults    Patient Name: Alejandro Castaneda  Admit Date: 2021 11:53 AM  MR #: [id number]  : 1950    Attending Physician: Archie Shirley MD  Reason for consult: Elevated Troponin    History of Presenting Illness:      Alejandro Castaneda is a 65 y.o. male on hospital day 1 with a history of . History Obtained From:  electronic medical record    Pt admitted with 2 week worsening respiratory distress weakness and confusioin. He is in ICU intubated on 100% FIO2. He is in CHARLES as well. Trops are detected. He has no prior history of CAD. I saw him as a ne pt recently and did order Nuclear stress test but he did not complete. He is morbidly obese and volume overloaded.      He has been Bradycardic 45s  History:      EKG:SB    Past Medical History:   Diagnosis Date    Hyperlipidemia     Type II or unspecified type diabetes mellitus without mention of complication, not stated as uncontrolled      Past Surgical History:   Procedure Laterality Date    CYSTOSCOPY  2017    FLEXIBLE W/COMPLEX CYSTOMETROGRAMEMG ANAL-COMPLEX UROFLOW-US PROSTATE    SHOULDER SURGERY      WRIST FUSION         Family History  Family History   Problem Relation Age of Onset    Prostate Cancer Father     Cancer Sister      [] Unable to obtain due to ventilated and/ or neurologic status    Social History     Socioeconomic History    Marital status:      Spouse name: Not on file    Number of children: Not on file    Years of education: Not on file    Highest education level: Not on file   Occupational History    Not on file   Tobacco Use    Smoking status: Former Smoker    Smokeless tobacco: Never Used   Substance and Sexual Activity    Alcohol use: Not Currently    Drug use: Never    Sexual activity: Not Currently   Other Topics Concern    Not on file   Social History Narrative    Not on file     Social Determinants of Health     Financial Resource Strain:     Difficulty of Paying Living Expenses:    Food Insecurity:     Worried About Running Out of Food in the Last Year:     [address] in the Last Year:    Transportation Needs:     Lack of Transportation (Medical):  Lack of Transportation (Non-Medical):    Physical Activity:     Days of Exercise per Week:     Minutes of Exercise per Session:    Stress:     Feeling of Stress :    Social Connections:     Frequency of Communication with Friends and Family:     Frequency of Social Gatherings with Friends and Family:     Attends Christianity Services:     Active Member of Clubs or Organizations:     Attends Club or Organization Meetings:     Marital Status:    Intimate Partner Violence:     Fear of Current or Ex-Partner:     Emotionally Abused:     Physically Abused:     Sexually Abused:       [] Unable to obtain due to ventilated and/ or neurologic status      Home Medications:      Medications Prior to Admission: valsartan (DIOVAN) 320 MG tablet, Take 320 mg by mouth daily  amLODIPine (NORVASC) 5 MG tablet, Take 5 mg by mouth daily  rosuvastatin (CRESTOR) 10 MG tablet, Take 10 mg by mouth daily  Saw Palmetto, Serenoa repens, (SAW PALMETTO PO), Take by mouth  carvedilol (COREG) 25 MG tablet, Take 25 mg by mouth 2 times daily (with meals)  vitamin D 25 MCG (1000 UT) CAPS, Take by mouth  insulin glargine (LANTUS SOLOSTAR) 100 UNIT/ML injection pen, 60 units twice a day  insulin aspart (NOVOLOG FLEXPEN) 100 UNIT/ML injection pen, 40 units am 35 units lunch and 50 units (Patient taking differently: 50 units AM, 40 units lunch, 50 units dinner)  oxybutynin (DITROPAN XL) 15 MG extended release tablet, Take 15 mg by mouth daily  chlorthalidone (HYGROTEN) 50 MG tablet, Take 50 mg by mouth daily  finasteride (PROSCAR) 5 MG tablet, Take 1 tablet by mouth daily  finasteride (PROSCAR) 5 MG tablet, Take 1 tablet by mouth daily. omeprazole (PRILOSEC) 20 MG capsule, Take 20 mg by mouth daily. aspirin 81 MG tablet, Take 81 mg by mouth daily.   Continuous Blood Gluc  (YippySTYLE MARYANN 14 DAY READER) NIRU, As directed  Continuous Blood Gluc Sensor (FREESTYLE MARYANN 14 DAY SENSOR) [facility], Every 2 weeks    Current Hospital Medications:     Scheduled Meds:   sodium chloride flush  5-40 mL Intravenous 2 times per day    chlorhexidine  15 mL Mouth/Throat BID    famotidine (PEPCID) injection  20 mg Intravenous BID    sodium chloride flush  10 mL Intravenous 2 times per day    aspirin  300 mg Rectal Daily    enoxaparin  1 mg/kg Subcutaneous BID    rosuvastatin  10 mg Oral Nightly    piperacillin-tazobactam  3,375 mg Intravenous Q8H    insulin lispro  0-6 Units Subcutaneous Q4H     Continuous Infusions:   dextrose 5% in lactated ringers 75 mL/hr at 06/03/21 1002    sodium chloride      dexmedetomidine Stopped (06/03/21 0801)    sodium chloride      dextrose 5 % and 0.45 % NaCl Stopped (06/02/21 2200)    propofol 40 mcg/kg/min (06/03/21 1555)    fentaNYL (SUBLIMAZE) infusion 100 mcg/hr (06/03/21 1640)    dextrose       PRN Meds:.sodium chloride flush, sodium chloride, sodium chloride flush, sodium chloride, polyethylene glycol, acetaminophen **OR** acetaminophen, ondansetron **OR** ondansetron, nitroGLYCERIN, potassium chloride **OR** potassium alternative oral replacement **OR** potassium chloride, magnesium sulfate, ipratropium-albuterol, glucose, dextrose, glucagon (rDNA), dextrose  .  dextrose 5% in lactated ringers 75 mL/hr at 06/03/21 1002    sodium chloride      dexmedetomidine Stopped (06/03/21 0801)    sodium chloride      dextrose 5 % and 0.45 % NaCl Stopped (06/02/21 2200)    propofol 40 mcg/kg/min (06/03/21 1555)    fentaNYL (SUBLIMAZE) infusion 100 mcg/hr (06/03/21 1640)    dextrose          Allergies:      Allergies   Allergen Reactions    Atorvastatin     Losartan     Pravastatin     Simvastatin         Review of Systems:       Review of Systems  vented    Objective Findings:     Vitals:BP (!) 114/46   Pulse (!) 47   Temp 97.2 °F (36.2 °C) (Rectal)   Resp 18   Ht 6' 1\" (1.854 m)   Wt (!) 353 lb 6.4 oz (160.3 kg)   SpO2 93%   BMI 46.63 kg/m²      Physical Examination:    Physical Exam   Constitutional: He appears healthy. No distress. HENT:   Normal cephalic and Atraumatic   Eyes: Pupils are equal, round, and reactive to light. Neck: Thyroid normal. No JVD present. No neck adenopathy. No thyromegaly present. Cardiovascular: Normal rate, regular rhythm, normal heart sounds, intact distal pulses and normal pulses. Pulmonary/Chest: Effort normal and breath sounds normal. He has no wheezes. He has no rales. He exhibits no tenderness. Abdominal: Soft. Bowel sounds are normal. There is no abdominal tenderness. Musculoskeletal:         General: Edema present. No tenderness. Normal range of motion. Cervical back: Normal range of motion and neck supple. Neurological: He is alert and oriented to person, place, and time. Skin: Skin is warm. No cyanosis. Nails show no clubbing.          Results/ Medications reviewed 6/3/2021, 5:30 PM     Laboratory, Microbiology, Pathology, Radiology, Cardiology, Medications and Transcriptions reviewed  Scheduled Meds:   sodium chloride flush  5-40 mL Intravenous 2 times per day    chlorhexidine  15 mL Mouth/Throat BID    famotidine (PEPCID) injection  20 mg Intravenous BID    sodium chloride flush  10 mL Intravenous 2 times per day    aspirin  300 mg Rectal Daily    enoxaparin  1 mg/kg Subcutaneous BID    rosuvastatin  10 mg Oral Nightly    piperacillin-tazobactam  3,375 mg Intravenous Q8H    insulin lispro  0-6 Units Subcutaneous Q4H     Continuous Infusions:   dextrose 5% in lactated ringers 75 mL/hr at 06/03/21 1002    sodium chloride      dexmedetomidine Stopped (06/03/21 0801)    sodium chloride      dextrose 5 % and 0.45 % NaCl Stopped (06/02/21 2200)    propofol 40 mcg/kg/min (06/03/21 1555)    fentaNYL (SUBLIMAZE) infusion 100 mcg/hr (06/03/21 1640)    dextrose Recent Labs     06/02/21  1215 06/02/21  1919 06/03/21  0504 06/03/21  1018   WBC 14.7*  --  9.7  --    HGB 11.8* 12.2* 10.9* 12.6*   HCT 38.4*  --  35.4*  --    MCV 76.7*  --  76.0*  --      --  270  --      Recent Labs     06/02/21  1215 06/02/21  1919 06/03/21  0504 06/03/21  1018     --  141  --    K 4.0  --  3.3*  --    CL 96  --  100  --    CO2 38*  --  32*  --    BUN 43*  --  49*  --    CREATININE 1.82* 2.1* 2.24* 2.1*     Recent Labs     06/02/21  1215   AST 24   ALT 27   BILITOT 0.3   ALKPHOS 77     No results for input(s): LIPASE, AMYLASE in the last 72 hours. Recent Labs     06/02/21  1215   PROT 7.6   INR 1.2     CTA Chest W WO  (PE study)    Result Date: 6/2/2021  CT PULMONARY ANGIOGRAM WITH INTRAVENOUS CONTRAST MEDIUM. REASON FOR EXAMINATION:  SHORTNESS OF BREATH. INTUBATED. POSITIVE D-DIMER. TECHNIQUE: Helical CTA was performed through the chest utilizing 100 ml of Isovue-370 intravenous contrast.  Images were obtained with bolus tracking in order to opacify the pulmonary arteries. Thick section coronal MIP 3D reconstructions were performed  on a separate workstation. COMPARISON:none FINDINGS:  Study limited secondary to beam hardening artifact from patient's arms at side during imaging. Study also limited secondary to suboptimal bolus administration. Pulmonary arteries: No intraluminal filling defects identified main, right, and left pulmonary arteries, and bilateral interlobar. Segmental and subsegmental distal pulmonary arterial branches cannot be assessed secondary to reasons cited above. Thoracic aorta: Normal in course and caliber. Cardiac Size: Enlarged. Pericardial effusion: None. Right lung: No nodules or masses. Small to moderate pleural effusion. Right lower lobe collapse. Subsegmental consolidation right middle and upper lobes. . Left lung: No nodules or masses. Small left pleural effusion. Left lower lobe collapse. Subsegmental consolidation lingula, and left upper lobe. Lymph nodes/mediastinum: No hilar, mediastinal, or axillary lymph node enlargement. Tip of endotracheal tube, in caudal trachea. Bilateral thyroid nodules, measuring up to 6 mm. Upper abdomen:Limited imaging upper abdomen shows tip of nasogastric tube within gastric body. Musculoskeletal:No osteoblastic, and no osteolytic lesions. Limited study as described. No CT evidence of pulmonary embolism within main, right, and left pulmonary arteries, and bilateral interlobar arteries. Segmental and subsegmental pulmonary arteries cannot be assessed. Small bilateral pleural effusions, right greater than left. Bilateral lower lobe collapse with bilateral subsegmental atelectasis/pneumonia, mid and upper lungs. Cardiomegaly. Bilateral thyroid nodules. Nonemergent thyroid sonography may be obtained for further evaluation if clinical concern warrants to rule out malignancy. All CT scans at this facility use dose modulation, iterative reconstruction, and/or weight based dosing when appropriate to reduce radiation dose to as low as reasonably achievable. XR CHEST PORTABLE    Result Date: 6/2/2021  Exam: XR CHEST PORTABLE History:  intubation Technique: AP portable view of the chest obtained. Comparison: none Chest x-ray portable Findings: The patient is intubated, there is an NG tube coursing towards the stomach  The cardiac silhouette is at the upper limits of normal in size which may be secondary to AP technique. There is no pneumothorax. There are increased interstitial pulmonary from both lungs with an area of consolidation in the left lung base and possible left pleural effusion. Bones of the thorax appear intact. Status post intubation and NG tube placement. There is an area of consolidation in the left lung base. There is possible left pleural effusion.      IR PICC WO SQ PORT/PUMP > 5 YEARS    Result Date: 6/3/2021  PERIPHERALLY INSERTED CENTRAL CATHETER (PICC) PLACEMENT WITH ULTRASOUND GUIDANCE CLINICAL HISTORY:  COURSE VASCULAR ACCESS. After discussing the procedure and possible complications with the patient, informed consent was obtained. The patient was placed on the Special Procedures table. The right upper extremity was sterilely prepared using a maximal sterile barrier technique which includes cap, mask, sterile gown, sterile gloves, sterile full-body drape, hand hygiene and 2% chlorhexidine for cutaneous antisepsis. A sterile ultrasound technique with sterile gel and sterile probe covers was also utilized. A pre-procedure time out was performed in order to assure the correct patient and procedure. Local anesthetic was administered. A peripheral vein was accessed with sonographic guidance. A sonographic spot image was obtained for documentation. A guidewire was advanced into the vein with fluoroscopic guidance and a sheath was placed over the guidewire. A 5-Lux[place]bourgish dual-lumen PICC was advanced through the sheath, up the arm and into the central vasculature. It was positioned appropriately. The sheath was removed. The catheter was shown to aspirate and infuse properly. The flange of the catheter was affixed to the arm using a PICC securement device. A spot image of the chest showed the tip of the PICC line to lie in the superior vena cava. The patient tolerated the procedure well and without complications. Number of films: 6. Fluoroscopy time: 29.4 seconds. CONCLUSION: SUCCESSFUL RIGHT PICC PLACEMENT WITHOUT IMMEDIATE COMPLICATIONS. Active Hospital Problems    Diagnosis Date Noted    Acute respiratory failure with hypoxia ([address] [J96.01] 06/02/2021     Priority: Low         Impression/Plan:   1. Hypoxic Respiratory failure - vented  2. Volume overloaded - likely acute on Chronic DHF - holding diuretics due to worsening GFR  3. Demand ischemia - ASA. No BB due to Bradycardia. 4. Will review Echo  5. Likely ÓSCAR      Thank you for allowing us to participate in the care of this patient. Will continue to follow. Please call if questions or concerns arise.     Electronically signed by Kennth Boeck, MD on 6/3/2021 at 5:30 PM

## 2021-06-03 NOTE — CONSULTS
Club or Organization Meetings:     Marital Status:    Intimate Partner Violence:     Fear of Current or Ex-Partner:     Emotionally Abused:     Physically Abused:     Sexually Abused:        Family History:   Family History   Problem Relation Age of Onset    Prostate Cancer Father     Cancer Sister        Review of Systems:   Unable to obtain due to intubation and sedation      Physical exam:   Constitutional:  VITALS:  /66   Pulse 50   Temp 97.7 °F (36.5 °C) (Oral)   Resp 22   Ht 6' 1\" (1.854 m)   Wt (!) 353 lb 6.4 oz (160.3 kg)   SpO2 92%   BMI 46.63 kg/m²     General: intubated, sedated  HEENT: normocephalic, atraumatic, ETT in place  Neck: supple, no mass  Lungs: intubated, on vent, coarse breath sounds  Heart: regular rate and rhythm, no murmurs or rubs  Abdomen: soft, non-tender, non-distended  MSK: no joint swelling or tenderness  Ext: no cyanosis, no peripheral edema  Neuro: unable to assess, sedated  Psych: unable to assess due to mental status  Skin: no rash        Data/  CBC:   Lab Results   Component Value Date    WBC 9.7 06/03/2021    RBC 4.65 06/03/2021    HGB 12.6 06/03/2021    HCT 35.4 06/03/2021    MCV 76.0 06/03/2021    MCH 23.4 06/03/2021    MCHC 30.7 06/03/2021    RDW 17.0 06/03/2021     06/03/2021     BMP:    Lab Results   Component Value Date     06/03/2021    K 3.3 06/03/2021     06/03/2021    CO2 32 06/03/2021    BUN 49 06/03/2021    LABALBU 4.0 06/02/2021    CREATININE 2.1 06/03/2021    CREATININE 2.24 06/03/2021    CALCIUM 8.5 06/03/2021    GFRAA 38 06/03/2021    LABGLOM 31 06/03/2021    GLUCOSE 63 06/03/2021     CTA Chest W WO  (PE study)    Result Date: 6/2/2021  CT PULMONARY ANGIOGRAM WITH INTRAVENOUS CONTRAST MEDIUM. REASON FOR EXAMINATION:  SHORTNESS OF BREATH. INTUBATED. POSITIVE D-DIMER.  TECHNIQUE: Helical CTA was performed through the chest utilizing 100 ml of Isovue-370 intravenous contrast.  Images were obtained with bolus tracking in order to opacify the pulmonary arteries. Thick section coronal MIP 3D reconstructions were performed  on a separate workstation. COMPARISON:none FINDINGS:  Study limited secondary to beam hardening artifact from patient's arms at side during imaging. Study also limited secondary to suboptimal bolus administration. Pulmonary arteries: No intraluminal filling defects identified main, right, and left pulmonary arteries, and bilateral interlobar. Segmental and subsegmental distal pulmonary arterial branches cannot be assessed secondary to reasons cited above. Thoracic aorta: Normal in course and caliber. Cardiac Size: Enlarged. Pericardial effusion: None. Right lung: No nodules or masses. Small to moderate pleural effusion. Right lower lobe collapse. Subsegmental consolidation right middle and upper lobes. . Left lung: No nodules or masses. Small left pleural effusion. Left lower lobe collapse. Subsegmental consolidation lingula, and left upper lobe. Lymph nodes/mediastinum: No hilar, mediastinal, or axillary lymph node enlargement. Tip of endotracheal tube, in caudal trachea. Bilateral thyroid nodules, measuring up to 6 mm. Upper abdomen:Limited imaging upper abdomen shows tip of nasogastric tube within gastric body. Musculoskeletal:No osteoblastic, and no osteolytic lesions. Limited study as described. No CT evidence of pulmonary embolism within main, right, and left pulmonary arteries, and bilateral interlobar arteries. Segmental and subsegmental pulmonary arteries cannot be assessed. Small bilateral pleural effusions, right greater than left. Bilateral lower lobe collapse with bilateral subsegmental atelectasis/pneumonia, mid and upper lungs. Cardiomegaly. Bilateral thyroid nodules. Nonemergent thyroid sonography may be obtained for further evaluation if clinical concern warrants to rule out malignancy.  All CT scans at this facility use dose modulation, iterative reconstruction, and/or weight based dosing when appropriate to reduce radiation dose to as low as reasonably achievable. XR CHEST PORTABLE    Result Date: 6/2/2021  Exam: XR CHEST PORTABLE History:  intubation Technique: AP portable view of the chest obtained. Comparison: none Chest x-ray portable Findings: The patient is intubated, there is an NG tube coursing towards the stomach  The cardiac silhouette is at the upper limits of normal in size which may be secondary to AP technique. There is no pneumothorax. There are increased interstitial pulmonary from both lungs with an area of consolidation in the left lung base and possible left pleural effusion. Bones of the thorax appear intact. Status post intubation and NG tube placement. There is an area of consolidation in the left lung base. There is possible left pleural effusion. Assessment:  65y.o. year old male with history s/f T2DM, HLD who presented for worsening SOB for 2 weeks. Pt currently intubated in the ICU. Nephrology consulted for CHARLES on CKD    1. CHARLES on CKD: most likely 2/2 contrast induced nephropathy (had CTPA yesterday), however function probably already low in setting of bradycardia, baseline Scr ~1.3? (was this in 11/20)  2. Acute on chronic hypoxic/hypercapnic respiratory failure  3. Hypokalemia  4. Anemia    Plan:  - ok to continue zosyn at current dose but will decrease frequency if function continues to worsen  - replete K  - agree w/ D5/LR     Thank you for the consultation. Will continue to follow  Please do not hesitate to call with questions.     Geni Rawls MD, MD

## 2021-06-04 NOTE — PROGRESS NOTES
Physician Progress Note      PATIENT:               Danita Trevino  CSN #:                  [id number]  :                       1950  ADMIT DATE:       2021 11:53 AM  DISCH DATE:  RESPONDING  PROVIDER #:        Sami White MD          QUERY TEXT:    Pt admitted with acute respiratory failure and has confusion documented. If   possible, please document in progress notes and discharge summary further   specificity regarding the type of encephalopathy:    The medical record reflects the following:  Risk Factors: CHF, COPD, DMII  Clinical Indicators: HR , RR 18-25, SpO2 77% on BVM with intubation and   MV, decreased breath sounds with rhonchi, labored respirations with accessory   muscle use and retractions, BLE  GCS 8, Responds to pain, glucose 49-81,   Procalcitonin . 19, sCr/GFR 1.82/36.8-2.24/28.9; Troponins . 027-.033; D-dimer   2.29, WBC 14.7, % FiO2 pH 7.283, pCO2 80, pO2 54, HCO3 37.6, TCO2 40,   BE 11, sat 81; CTA chest: Right lung: No nodules or masses. Small to moderate   pleural effusion. Right lower lobe collapse. Subsegmental consolidation right   middle and upper lobes. Left lung: No nodu  Treatment: ICU care, Critical care, Nephrology & Cardiology consults, IVFB   500, precedex, D50, IV zosyn, ABG, CTA chest, Neuro checks, POCT glucose,   serial trops, serial EKG, I&O, daily weights, labs and monitoring    Thank you,  Manjinder Yang RN BSN [facility]  [phone]  Options provided:  -- Metabolic encephalopathy  -- Other - I will add my own diagnosis  -- Disagree - Not applicable / Not valid  -- Disagree - Clinically unable to determine / Unknown  -- Refer to Clinical Documentation Reviewer    PROVIDER RESPONSE TEXT:    This patient has metabolic encephalopathy.     Query created by: Ivon Will on 6/3/2021 9:27 AM      QUERY TEXT:    Pt admitted with acute respiratory failure and aspiration pneumonia with noted   elevation of WBC, abnormal procalcitonin, tachycardia then bradycardia, tachypnea and confusion with a GCS 8. Please document in the progress notes   and discharge summary if you are evaluating and /or treating any of the   following: The medical record reflects the following:  Risk Factors: CHF, COPD, DMII  Clinical Indicators: HR , RR 18-25, SpO2 77% on BVM with intubation and   MV, decreased breath sounds with rhonchi, labored respirations with accessory   muscle use and retractions, BLE  GCS 8, Responds to pain, glucose 49-81,   Procalcitonin . 19, sCr/GFR 1.82/36.8-2.24/28.9; Troponins . 027-.033; D-dimer   2.29, WBC 14.7, % FiO2 pH 7.283, pCO2 80, pO2 54, HCO3 37.6, TCO2 40,   BE 11, sat 81; CTA chest: Right lung: No nodules or masses. Small to moderate   pleural effusion. Right lower lobe collapse. Subsegmental consolidation right   middle and upper lobes.  Left lung: No nodu  Treatment: ICU care, Critical care, Nephrology & Cardiology consults, IVFB   500, precedex, D50, IV zosyn, ABG, MV w/O2 protocols, CTA chest, Neuro checks,   POCT glucose, serial trops, serial EKG, I&O, daily weights, labs and   monitoring    Thank you,  Jeffrey Ruiz RN BSN [facility]  [phone]  Options provided:  -- Sepsis  -- Sepsis was ruled out  -- Other - I will add my own diagnosis  -- Disagree - Not applicable / Not valid  -- Disagree - Clinically unable to determine / Unknown  -- Refer to Clinical Documentation Reviewer    PROVIDER RESPONSE TEXT:    Possible sepsis with lung as source as detailed in pulm/cc note    Query created by: Anish Ferrer on 6/3/2021 9:31 AM      Electronically signed by:  Sade Ronquillo MD 6/4/2021 11:36 AM

## 2021-06-04 NOTE — PLAN OF CARE
Problem: Non-Violent Restraints  Goal: Removal from restraints as soon as assessed to be safe  Outcome: Ongoing  Goal: No harm/injury to patient while restraints in use  Outcome: Ongoing  Goal: Patient's dignity will be maintained  Outcome: Ongoing     Problem: Skin Integrity:  Goal: Will show no infection signs and symptoms  Description: Will show no infection signs and symptoms  Outcome: Ongoing  Goal: Absence of new skin breakdown  Description: Absence of new skin breakdown  Outcome: Ongoing     Problem: Falls - Risk of:  Goal: Will remain free from falls  Description: Will remain free from falls  Outcome: Ongoing  Goal: Absence of physical injury  Description: Absence of physical injury  Outcome: Ongoing     Problem: Discharge Planning:  Goal: Participates in care planning  Description: Participates in care planning  Outcome: Ongoing  Goal: Discharged to appropriate level of care  Description: Discharged to appropriate level of care  Outcome: Ongoing     Problem: Airway Clearance - Ineffective:  Goal: Ability to maintain a clear airway will improve  Description: Ability to maintain a clear airway will improve  Outcome: Ongoing     Problem: Anxiety/Stress:  Goal: Level of anxiety will decrease  Description: Level of anxiety will decrease  Outcome: Ongoing     Problem: Aspiration:  Goal: Absence of aspiration  Description: Absence of aspiration  Outcome: Ongoing     Problem:  Bowel Function - Altered:  Goal: Bowel elimination is within specified parameters  Description: Bowel elimination is within specified parameters  Outcome: Ongoing     Problem: Cardiac Output - Decreased:  Goal: Hemodynamic stability will improve  Description: Hemodynamic stability will improve  Outcome: Ongoing     Problem: Fluid Volume - Imbalance:  Goal: Absence of imbalanced fluid volume signs and symptoms  Description: Absence of imbalanced fluid volume signs and symptoms  Outcome: Ongoing     Problem: Gas Exchange - Impaired:  Goal: nutrition therapy  Outcome: Ongoing

## 2021-06-04 NOTE — PROGRESS NOTES
Note for 19:00-07:30    Pt remains intubated and sedated;tolerating well. T/F infusing per order. Placement verified via external length, resp status and pH. Pt tolerating t/f infusion well. Pt tolerating IV infusion/titration well (see eMAR). Assessments completed (see flowsheets). Safety measures in place.

## 2021-06-04 NOTE — PROGRESS NOTES
Pulmonary & Critical Care Medicine ICU Progress Note  Chief complaint : Acute respiratory failure    Subjunctive/24 hour events :   Patient seen and examined during multidisciplinary rounds with RN, charge nurse, RT, pharmacy, dietitian, and social service. Patient on vent, currently sedated, when sedation is light he follows commands and moves all 4 extremities, continues to require high FiO2 and PEEP requirement 100% and 19 feet, no fever , UO 1400 cc , no bowel movement, tube feed advanced, BS is controlled    Social History     Tobacco Use    Smoking status: Former Smoker    Smokeless tobacco: Never Used   Substance Use Topics    Alcohol use: Not Currently         Problem Relation Age of Onset    Prostate Cancer Father     Cancer Sister        Recent Labs     06/02/21  1919 06/03/21  1018   PHART 7.398 7.404   [id number] 57* 53*   PO2ART 72* 63*       MV Settings:  Vent Mode: AC/VC Rate Set: 18 bmp/Vt Ordered: 500 mL/ /FiO2 : 100 %           IV:   sodium chloride      sodium chloride      propofol 30 mcg/kg/min (06/04/21 0629)    fentaNYL (SUBLIMAZE) infusion 125 mcg/hr (06/04/21 0845)    dextrose         Vitals:  BP (!) 108/50   Pulse (!) 45   Temp 97.9 °F (36.6 °C) (Rectal)   Resp 18   Ht 6' 1\" (1.854 m)   Wt (!) 384 lb 14.4 oz (174.6 kg)   SpO2 94%   BMI 50.78 kg/m²    Tmax:        Intake/Output Summary (Last 24 hours) at 6/4/2021 1111  Last data filed at 6/4/2021 0649  Gross per 24 hour   Intake 3406 ml   Output 1450 ml   Net 1956 ml       EXAM:  General: On vent, sedated, unresponsive  Head: normocephalic, atraumatic  Eyes:No gross abnormalities. ENT:  MMM no lesions, ET and OG tube  Neck:  supple and no masses  Chest : Good air movement, vent sounds, bilateral rales, no wheezing, nontender, tympanic  Heart[de-identified] Heart sounds are normal.  Regular rate and rhythm without murmur, gallop or rub.   ABD:  symmetric, soft, non-tender, no apparent guarding or rebound  Musculoskeletal : no cyanosis, input(s): KETONESU    Cultures:  Negative so far  Films:  CXR reviewed by me and it showed slightly improved, still congested, however infiltrate is improved      Assessment: This is a critically ill patient at risk of deterioration / death , needing close ICU monitoring and intervention due to below noted problems     · Acute on chronic hypoxic and hypercapnic respiratory failure  · Multilobar pneumonia, likely aspiration pneumonia  · Volume overload  · Sleep-related breathing disorder with acute exacerbation  · Demand ischemia  · Acute kidney injury    Recommendations  · Vent support lung protective strategy  · head of the bed 30°  · Daily sedation holidays, patient was able to move all 4 extremities today  · Breathing trials when patient lung mechanics improved  · Sedation with combination propofol and fentanyl target R ASS of 0 to -1  · Watch for ICU delirium: TV on, natural light, avoid benzos, pain control, early mobility, and family engagement  · PUD prophylaxis  · DVT prophylaxis,  · Currently on Lovenox, this will have to change to either heparin drip or subcu heparin depending on cardiology decision on dosing of anticoagulation. · Will discuss with nephrology today, likely patient would benefit from ultrafiltration for fluid removal  · Continue to monitor blood sugar target 140180  · Continue tube feed  · Continue laxatives  · Fluid management per nephrology  · Diuretic currently on hold  · Replace electrolytes, appreciate nephrology  · Chest x-ray tomorrow  · D5LR discontinued blood sugar improved and tube feed advanced       Due to the immediate potential for life-threatening deterioration due to acute respiratory failure I spent 38  minutes providing critical care.  This time is excluding time spent performing procedures.           Electronically signed by Zoya Almonte MD,  [facility] ,on 6/4/2021 at 11:11 AM

## 2021-06-04 NOTE — PROGRESS NOTES
Nutrition Note    Pt tolerating TF at trophic rate. IV dextrose discontinued, will increase TF goal rate. Continue Peptide Based High Protein TF (Vital HP) @ 20 ml/hr, increase rate as tolerated to a goal rate of 40 ml/hr. Add 2 protein modulars to am flush and 1 protein modular to afternoon flush. Continue with 100 ml water flush every 4 hrs    · Current Tube Feeding (TF) Orders:   · Feeding Route: Orogastric  · Formula: Diabetic  · Schedule: Continuous @ 20 ml/hr x 24 hrs. Increase rate as tolerated to goal rate of 40 ml/hr.   · Additives/Modulars: Protein (TID (312 kcal, 78 gm protein))  · Water Flushes: 100 ml every 4 hrs (600 ml)  · Current TF & Flush Orders Provides: 792 kcal (+ 733 propofol kcal), 120 gm protein, ~ 1200 ml free water  · Goal TF & Flush Orders Provides: 1272 kcal (+ 733 propofol kcal), 162 gm protein, ~ 1670 ml free water    Electronically signed by Heather Galicia RD, LD on 6/4/21 at 10:18 AM EDT

## 2021-06-04 NOTE — PROGRESS NOTES
Progress Note  Patient: Gricel Lara  Unit/Bed: PN59/AZ60-16  YOB: 1950  MRN: [id number]  Acct: [de-identified]   Admitting Diagnosis: Acute respiratory failure with hypoxia [facility]) [J96.01]  Admit Date:  6/2/2021  Hospital Day: 2    Chief Complaint:  Resp failure     Histories:  Past Medical History:   Diagnosis Date    Hyperlipidemia     Type II or unspecified type diabetes mellitus without mention of complication, not stated as uncontrolled      Past Surgical History:   Procedure Laterality Date    CYSTOSCOPY  04/19/2017    FLEXIBLE W/COMPLEX CYSTOMETROGRAMEMG ANAL-COMPLEX UROFLOW-US PROSTATE    SHOULDER SURGERY      WRIST FUSION       Family History   Problem Relation Age of Onset    Prostate Cancer Father     Cancer Sister      Social History     Socioeconomic History    Marital status:      Spouse name: None    Number of children: None    Years of education: None    Highest education level: None   Occupational History    None   Tobacco Use    Smoking status: Former Smoker    Smokeless tobacco: Never Used   Substance and Sexual Activity    Alcohol use: Not Currently    Drug use: Never    Sexual activity: Not Currently   Other Topics Concern    None   Social History Narrative    None     Social Determinants of Health     Financial Resource Strain:     Difficulty of Paying Living Expenses:    Food Insecurity:     Worried About Running Out of Food in the Last Year:     Ran Out of Food in the Last Year:    Transportation Needs:     Lack of Transportation (Medical):      Lack of Transportation (Non-Medical):    Physical Activity:     Days of Exercise per Week:     Minutes of Exercise per Session:    Stress:     Feeling of Stress :    Social Connections:     Frequency of Communication with Friends and Family:     Frequency of Social Gatherings with Friends and Family:     Attends [employer]oration[employer] Services:     Active Member of Clubs or Organizations:     Attends Club or MONOPCT 8.2 06/04/2021    BASOPCT 0.5 06/04/2021    MONOSABS 0.8 06/04/2021    LYMPHSABS 1.9 06/04/2021    EOSABS 0.2 06/04/2021    BASOSABS 0.1 06/04/2021     CMP:    Lab Results   Component Value Date     06/04/2021    K 3.6 06/04/2021    CL 98 06/04/2021    CO2 33 06/04/2021    BUN 53 06/04/2021    CREATININE 2.47 06/04/2021    GFRAA 31.3 06/04/2021    LABGLOM 25.8 06/04/2021    GLUCOSE 99 06/04/2021    PROT 7.6 06/02/2021    LABALBU 4.0 06/02/2021    CALCIUM 8.1 06/04/2021    BILITOT 0.3 06/02/2021    ALKPHOS 77 06/02/2021    AST 24 06/02/2021    ALT 27 06/02/2021     BMP:    Lab Results   Component Value Date     06/04/2021    K 3.6 06/04/2021    CL 98 06/04/2021    CO2 33 06/04/2021    BUN 53 06/04/2021    LABALBU 4.0 06/02/2021    CREATININE 2.47 06/04/2021    CALCIUM 8.1 06/04/2021    GFRAA 31.3 06/04/2021    LABGLOM 25.8 06/04/2021    GLUCOSE 99 06/04/2021     Magnesium:    Lab Results   Component Value Date    MG 2.1 06/03/2021     Troponin:    Lab Results   Component Value Date    TROPONINI 0.036 06/03/2021        Active Hospital Problems    Diagnosis Date Noted    Acute respiratory failure with hypoxia ([facility] [address] [J96.01] 06/02/2021     Priority: Low        Assessment/Plan:  1. Hypoxic Respiratory failure - vented  2. Volume overloaded - likely acute on Chronic DHF - holding diuretics due to worsening GFR. May need CRRT or trial of iv Lasix. Nephrology on.   3. Demand ischemia - ASA. No BB due to Bradycardia. 4. Echo EF 55 RVSP 32   5. Likely ÓSCAR   6.  Spoke with wife today via phone [phone]       Electronically signed by Markus Lemon MD on 6/4/2021 at 11:28 AM

## 2021-06-04 NOTE — PROGRESS NOTES
Hospitalist Progress Note      Date of Admission: 6/2/2021  Chief Complaint:    Chief Complaint   Patient presents with    Shortness of Breath     Subjective:  intubated      Medications:    Infusion Medications    dextrose 5% in lactated ringers 75 mL/hr at 06/03/21 1002    sodium chloride      dexmedetomidine Stopped (06/03/21 0801)    sodium chloride      dextrose 5 % and 0.45 % NaCl Stopped (06/02/21 2200)    propofol 30 mcg/kg/min (06/03/21 1807)    fentaNYL (SUBLIMAZE) infusion 100 mcg/hr (06/03/21 1640)    dextrose       Scheduled Medications    sodium chloride flush  5-40 mL Intravenous 2 times per day    chlorhexidine  15 mL Mouth/Throat BID    famotidine (PEPCID) injection  20 mg Intravenous BID    sodium chloride flush  10 mL Intravenous 2 times per day    aspirin  300 mg Rectal Daily    enoxaparin  1 mg/kg Subcutaneous BID    rosuvastatin  10 mg Oral Nightly    piperacillin-tazobactam  3,375 mg Intravenous Q8H    insulin lispro  0-6 Units Subcutaneous Q4H     PRN Meds: sodium chloride flush, sodium chloride, sodium chloride flush, sodium chloride, polyethylene glycol, acetaminophen **OR** acetaminophen, ondansetron **OR** ondansetron, nitroGLYCERIN, potassium chloride **OR** potassium alternative oral replacement **OR** potassium chloride, magnesium sulfate, ipratropium-albuterol, glucose, dextrose, glucagon (rDNA), dextrose    Intake/Output Summary (Last 24 hours) at 6/3/2021 2116  Last data filed at 6/3/2021 1818  Gross per 24 hour   Intake 2415 ml   Output 900 ml   Net 1515 ml     Exam:  BP (!) 111/52   Pulse (!) 47   Temp 97.2 °F (36.2 °C) (Rectal)   Resp 18   Ht 6' 1\" (1.854 m)   Wt (!) 353 lb 6.4 oz (160.3 kg)   SpO2 96%   BMI 46.63 kg/m²   Head: Normocephalic, atraumatic  Sclera clear  Neck JVD flat  Lungs: normal effort of breathing    Labs:   Recent Labs     06/02/21  1215 06/02/21  1919 06/03/21  0504 06/03/21  1018   WBC 14.7*  --  9.7  --    HGB 11.8* 12.2* 10.9* Q6H SSI while NPO  5. CKD:  Renally dose meds  6. Functional Status: Fall precautions. Up with assistance. PT OT  7. Diet: NPO  8.  DVT ppx: Lovenox SCDs    Mg Childress MD ,MD

## 2021-06-04 NOTE — PROGRESS NOTES
Patient intubated sedated with propofol and fentanyl. Patient in restraints. Patients sinus martine, and responded appropriately during sedation vacation this morning. Patient wife bedside and updated throughout the day. Patient is having low urine output. Patient placed on lasix drip Patient oxygen saturation 89-91 %. Dr. Amanuel [place] notified chest xray ordered. Patient proned at 1700 and ABG ordered for 1800. Patient oxygen saturation increase to 96-99% after being proned. Patient handoff given to [facility].

## 2021-06-04 NOTE — CONSULTS
[address]                      [address], [address] [facility]                                  CONSULTATION    PATIENT NAME: Pb Lucero                     :        1950  MED REC NO:   [id number]                            ROOM:       [facility]  ACCOUNT NO:   [de-identified]                           ADMIT DATE: 2021  PROVIDER:     Ervin Rawls DO    CONSULT DATE:  2021    RENAL CONSULT    HISTORY OF PRESENT ILLNESS:  This is a 64-year-old male being seen at  the request of hospitalist for change in renal function. The patient  has a history of CKD III with a GFR baseline of approximately 38  mL/minute. At this time, the patient is on a ventilator, sedated. Wife  is in the room. He has been having shortness of breath for 2 weeks with  increasing confusion. The patient was intubated in the emergency room,  has been in the intensive care unit at this time. The patient has some  slight bradycardia on admission to the hospital with hypotension. The  patient has had good urine outputs since his arrival to the hospital 48  hours ago. The patient does get treated by the [facility] for his underlying  medical conditions. PAST MEDICAL HISTORY:  COPD, diabetes mellitus type 2, CKD III,  hyperlipidemia. PAST SURGICAL HISTORY:  Cystoscopy, shoulder surgery, wrist fusion. HABITS:  Quit smoking years ago. No alcohol, opioids per his wife. MEDICATIONS:  Medicine at the time of admission to the hospital;  Prilosec, Proscar, Hygroton, Ditropan, NovoLog, Lantus, vitamin D,  Crestor, Norvasc, Diovan. ALLERGIES TO MEDICATIONS:  LIPITOR, LOSARTAN, PRAVASTATIN, SIMVASTATIN. PHYSICAL EXAMINATION:  VITAL SIGNS:   6 feet 1 inch, 384 pounds. Blood pressure is 108/60 at  the time of admission to the hospital, the patient had slight lower  blood pressures with systolics in the 52S. Heart rate 50, respirations  on the ventilator 18, afebrile.   HEENT: Normocephalic. Pupils reactive to light. Sclerae clear. ET-tube in place. NECK:  Shows soft bruits. No JVD or adenopathy. CARDIOVASCULAR:  Heart is distant with 1/6 systolic murmur. ABDOMEN:  Soft. There is no guarding, rigidity or distention. Bowel  sounds are present. EXTREMITIES:  Show trace edema. SKIN:  Warm and dry. There is no evidence of cyanosis. IMPRESSION:  CKD III with CHARLES secondary to bradycardia, hypotension,  diabetes mellitus type 2, hypertension, hyperlipidemia, carotid bruits. PLAN:  Watch I and Os carefully. Follow BMPs. Watch not to over  diurese, maintain euvolemic state. Avoid nephrotoxic agents. Lab in  the morning. Urine for albumin-creatinine ratio.         Adilia Beasley DO    D: 06/04/2021 12:10:07       T: 06/04/2021 12:21:20     [id number]  Job#: [id number]     Doc#: [id number]    CC:

## 2021-06-04 NOTE — CARE COORDINATION
AM ROUNDS COMPLETED WITH ICU TEAM. PT REMAINS ON VENT. FIO2 100. [address] NOTIFIED OF ADMISSION. SPOKE WITH STACY. WILL CONTINUE TO FOLLOW FOR NEEDS.

## 2021-06-05 NOTE — PATIENT CARE CONFERENCE
Approximately 0930 wife updated on plan of care. Electronically signed by Laura Jain RN on 6/5/2021 at 12:25 PM  Approximately 1130 Dr. Juana Guan at bedside updated on pt and that he had a 8 beat run of vtach this am. Vss, will continue to monitor.  Electronically signed by Laura Jain RN on 6/5/2021 at 1:18 PM

## 2021-06-05 NOTE — PROGRESS NOTES
0800 am assessment completed as noted. Vss, will continue to monitor.  Electronically signed by Jah Yang RN on 6/5/2021 at 8:18 AM

## 2021-06-05 NOTE — PROGRESS NOTES
Pulmonary & Critical Care Medicine ICU Progress Note  Chief complaint : Acute respiratory failure    Subjunctive/24 hour events :   Patient seen and examined during multidisciplinary rounds with RN, charge nurse, RT, pharmacy, dietitian, and social service. Patient was placed in prone position yesterday, significantly improved saturation since placed in prone, he is on 80% FiO2, PEEP of 16, saturation 99%, no fever overnight, urine output 2 L, no bowel movement,   no vomiting, tolerating tube feed    Social History     Tobacco Use    Smoking status: Former Smoker    Smokeless tobacco: Never Used   Substance Use Topics    Alcohol use: Not Currently         Problem Relation Age of Onset    Prostate Cancer Father     Cancer Sister        Recent Labs     06/03/21  1018 06/04/21  1814   PHART 7.404 7.338*   OGN8HAQ 53* 65*   PO2ART 63* 123*       MV Settings:  Vent Mode: AC/VC Rate Set: 18 bmp/Vt Ordered: 500 mL/ /FiO2 : 80 %           IV:   furosemide (LASIX) 1mg/ml infusion 10 mg/hr (06/04/21 2253)    sodium chloride      sodium chloride      propofol 50 mcg/kg/min (06/05/21 0646)    fentaNYL (SUBLIMAZE) infusion 175 mcg/hr (06/05/21 0810)    dextrose         Vitals:  BP (!) 156/59   Pulse 67   Temp 97.5 °F (36.4 °C) (Rectal)   Resp 18   Ht 6' 1\" (1.854 m)   Wt (!) 411 lb 4.8 oz (186.6 kg)   SpO2 100%   BMI 54.26 kg/m²    Tmax:        Intake/Output Summary (Last 24 hours) at 6/5/2021 0825  Last data filed at 6/5/2021 0700  Gross per 24 hour   Intake 2185 ml   Output 2025 ml   Net 160 ml       EXAM:  General: On vent, sedated, unresponsive  Head: normocephalic, atraumatic  Eyes:No gross abnormalities. ENT:  MMM no lesions, ET and OG tube  Neck:  supple and no masses  Chest : Good air movement, no wheezing, minimal rales at the base, nontender, tympanic  Heart[de-identified] Heart sounds are normal.  Regular rate and rhythm without murmur, gallop or rub.   ABD:  symmetric, soft, non-tender, no apparent guarding or rebound  Musculoskeletal : no cyanosis, no clubbing and 1 + edema-    Neuro:  Sedated, unresponsive  Skin: No rashes or nodules noted. Lymph node:  no cervical nodes  Urology: Yes Carreno   Psychiatric: Sedated and calm    Medications:  Scheduled Meds:   polyethylene glycol  17 g Per NG tube Daily    docusate  100 mg Oral BID    heparin (porcine)  5,000 Units Subcutaneous 3 times per day    chlorothiazide (DIURIL) IVPB  500 mg Intravenous Q12H    sodium chloride flush  5-40 mL Intravenous 2 times per day    chlorhexidine  15 mL Mouth/Throat BID    famotidine (PEPCID) injection  20 mg Intravenous BID    aspirin  300 mg Rectal Daily    rosuvastatin  10 mg Oral Nightly    piperacillin-tazobactam  3,375 mg Intravenous Q8H    insulin lispro  0-6 Units Subcutaneous Q4H       PRN Meds:  sodium chloride flush, sodium chloride, sodium chloride, acetaminophen **OR** acetaminophen, ondansetron **OR** ondansetron, nitroGLYCERIN, potassium chloride **OR** potassium alternative oral replacement **OR** potassium chloride, magnesium sulfate, ipratropium-albuterol, glucose, dextrose, glucagon (rDNA), dextrose    Results: reviewed by me   CBC:   Recent Labs     06/03/21  0504 06/04/21  0600 06/04/21  1814 06/05/21  0600   WBC 9.7 10.1  --  10.2   HGB 10.9* 10.3* 11.7* 10.8*   HCT 35.4* 33.5*  --  34.5*   MCV 76.0* 76.2*  --  76.4*    267  --  265     BMP:   Recent Labs     06/03/21  0504 06/04/21  0600 06/04/21  1814 06/05/21  0600    140  --  139   K 3.3* 3.6  --  4.0  4.0    98  --  95   CO2 32* 33*  --  32*   BUN 49* 53*  --  61*   CREATININE 2.24* 2.47* 3.1* 3.27*     LIVER PROFILE:   Recent Labs     06/02/21  1215   AST 24   ALT 27   BILITOT 0.3   ALKPHOS 77     PT/INR:   Recent Labs     06/02/21  1215   PROTIME 14.8   INR 1.2     APTT: No results for input(s): APTT in the last 72 hours.   UA:No results for input(s): NITRITE, COLORU, PHUR, LABCAST, WBCUA, RBCUA, MUCUS, TRICHOMONAS, YEAST,

## 2021-06-05 NOTE — PROGRESS NOTES
19:30 Bedside report received from AGUILA Skelton RN. Pt currently in prone position. Pt remains intubated and sedated and is tolerating well. Safety measures in place. 21:00 Assessment completed (see flowsheets). Pt tolerating prone position and intubation well. Pt tolerating IV infusions/titrations well. OG at 60cm at the teeth. Placement verified via external length, respiratory status, pH and residual of 350ml. T/F infusion on hold for 30 minutes. Safety measures in place. 21:30 Residual recheck and is 290ml at this time. T/F restarted at 20ml/hr. Pt tolerating infusion well at this time. 01:00 Pt reassessed no changes in assessment noted. Pt tolerating propofol and fentanyl titrations well (see eMAR). Residual at this time is 30ml. T/F infusion rate increased to 30ml/hr. Pt tolerating well. Safety measures in place. 05:00 Assessments completed   (see flowsheets). Residual at this time 120ml. T/F infusion remains at 30ml/hr. Pt tolerating well.     06:30 AM labs drawn, labeled and sent to lab.

## 2021-06-05 NOTE — PROGRESS NOTES
Progress Note  Patient: Hussein Melendrez  Unit/Bed: BD89/SN66-07  YOB: 1950  MRN: [id number]  Acct: [de-identified]   Admitting Diagnosis: Acute respiratory failure with hypoxia [facility]) [J96.01]  Admit Date:  6/2/2021  Hospital Day: 3    Chief Complaint:  Resp failure     Histories:  Past Medical History:   Diagnosis Date    Hyperlipidemia     Type II or unspecified type diabetes mellitus without mention of complication, not stated as uncontrolled      Past Surgical History:   Procedure Laterality Date    CYSTOSCOPY  04/19/2017    FLEXIBLE W/COMPLEX CYSTOMETROGRAMEMG ANAL-COMPLEX UROFLOW-US PROSTATE    SHOULDER SURGERY      WRIST FUSION       Family History   Problem Relation Age of Onset    Prostate Cancer Father     Cancer Sister      Social History     Socioeconomic History    Marital status:      Spouse name: None    Number of children: None    Years of education: None    Highest education level: None   Occupational History    None   Tobacco Use    Smoking status: Former Smoker    Smokeless tobacco: Never Used   Substance and Sexual Activity    Alcohol use: Not Currently    Drug use: Never    Sexual activity: Not Currently   Other Topics Concern    None   Social History Narrative    None     Social Determinants of Health     Financial Resource Strain:     Difficulty of Paying Living Expenses:    Food Insecurity:     Worried About Running Out of Food in the Last Year:     Ran Out of Food in the Last Year:    Transportation Needs:     Lack of Transportation (Medical):      Lack of Transportation (Non-Medical):    Physical Activity:     Days of Exercise per Week:     Minutes of Exercise per Session:    Stress:     Feeling of Stress :    Social Connections:     Frequency of Communication with Friends and Family:     Frequency of Social Gatherings with Friends and Family:     Attends [employer] Services:     Active Member of Clubs or Organizations:     Attends Club or

## 2021-06-05 NOTE — PROGRESS NOTES
Hospitalist Progress Note      Date of Admission: 6/2/2021  Chief Complaint:    Chief Complaint   Patient presents with    Shortness of Breath     Subjective:  intubated      Medications:    Infusion Medications    propofol 50 mcg/kg/min (06/05/21 1236)    furosemide (LASIX) 1mg/ml infusion 10 mg/hr (06/05/21 0906)    sodium chloride      sodium chloride      fentaNYL (SUBLIMAZE) infusion 175 mcg/hr (06/05/21 0810)    dextrose       Scheduled Medications    lactulose  20 g Oral TID    polyethylene glycol  17 g Per NG tube Daily    docusate  100 mg Oral BID    heparin (porcine)  5,000 Units Subcutaneous 3 times per day    sodium chloride flush  5-40 mL Intravenous 2 times per day    chlorhexidine  15 mL Mouth/Throat BID    famotidine (PEPCID) injection  20 mg Intravenous BID    aspirin  300 mg Rectal Daily    rosuvastatin  10 mg Oral Nightly    piperacillin-tazobactam  3,375 mg Intravenous Q8H    insulin lispro  0-6 Units Subcutaneous Q4H     PRN Meds: sodium chloride flush, sodium chloride, sodium chloride, acetaminophen **OR** acetaminophen, ondansetron **OR** ondansetron, nitroGLYCERIN, potassium chloride **OR** potassium alternative oral replacement **OR** potassium chloride, magnesium sulfate, ipratropium-albuterol, glucose, dextrose, glucagon (rDNA), dextrose    Intake/Output Summary (Last 24 hours) at 6/5/2021 1352  Last data filed at 6/5/2021 1155  Gross per 24 hour   Intake 3532 ml   Output 2550 ml   Net 982 ml     Exam:  BP (!) 113/41   Pulse 58   Temp 97.5 °F (36.4 °C) (Rectal)   Resp 18   Ht 6' 1\" (1.854 m)   Wt (!) 411 lb 4.8 oz (186.6 kg)   SpO2 91%   BMI 54.26 kg/m²   Head: Normocephalic, atraumatic  Sclera clear  Neck JVD flat  Lungs: normal effort of breathing    Labs:   Recent Labs     06/03/21  0504 06/04/21  0600 06/04/21  1814 06/05/21  0600   WBC 9.7 10.1  --  10.2   HGB 10.9* 10.3* 11.7* 10.8*   HCT 35.4* 33.5*  --  34.5*    267  --  265     Recent Labs and cardiology  4. DMII:  Q6H SSI while NPO  5. CKD:  Renally dose meds  6. Functional Status: Fall precautions. Up with assistance. PT OT  7. Diet: NPO  8.  DVT ppx: Lovenox SCDs    Nneka Zaragoza MD ,MD

## 2021-06-05 NOTE — PROGRESS NOTES
Hospitalist Progress Note      Date of Admission: 6/2/2021  Chief Complaint:    Chief Complaint   Patient presents with    Shortness of Breath     Subjective:  intubated      Medications:    Infusion Medications    propofol 50 mcg/kg/min (06/05/21 1236)    furosemide (LASIX) 1mg/ml infusion 10 mg/hr (06/05/21 0906)    sodium chloride      sodium chloride      fentaNYL (SUBLIMAZE) infusion 175 mcg/hr (06/05/21 0810)    dextrose       Scheduled Medications    lactulose  20 g Oral TID    polyethylene glycol  17 g Per NG tube Daily    docusate  100 mg Oral BID    heparin (porcine)  5,000 Units Subcutaneous 3 times per day    sodium chloride flush  5-40 mL Intravenous 2 times per day    chlorhexidine  15 mL Mouth/Throat BID    famotidine (PEPCID) injection  20 mg Intravenous BID    aspirin  300 mg Rectal Daily    rosuvastatin  10 mg Oral Nightly    piperacillin-tazobactam  3,375 mg Intravenous Q8H    insulin lispro  0-6 Units Subcutaneous Q4H     PRN Meds: sodium chloride flush, sodium chloride, sodium chloride, acetaminophen **OR** acetaminophen, ondansetron **OR** ondansetron, nitroGLYCERIN, potassium chloride **OR** potassium alternative oral replacement **OR** potassium chloride, magnesium sulfate, ipratropium-albuterol, glucose, dextrose, glucagon (rDNA), dextrose    Intake/Output Summary (Last 24 hours) at 6/5/2021 1352  Last data filed at 6/5/2021 1155  Gross per 24 hour   Intake 3532 ml   Output 2550 ml   Net 982 ml     Exam:  BP (!) 113/41   Pulse 58   Temp 97.5 °F (36.4 °C) (Rectal)   Resp 18   Ht 6' 1\" (1.854 m)   Wt (!) 411 lb 4.8 oz (186.6 kg)   SpO2 91%   BMI 54.26 kg/m²   Head: Normocephalic, atraumatic  Sclera clear  Neck JVD flat  Lungs: scattered crackles    Labs:   Recent Labs     06/03/21  0504 06/04/21  0600 06/04/21  1814 06/05/21  0600   WBC 9.7 10.1  --  10.2   HGB 10.9* 10.3* 11.7* 10.8*   HCT 35.4* 33.5*  --  34.5*    267  --  265     Recent Labs     06/03/21 4484 06/04/21  0600 06/04/21  1814 06/05/21  0600    140  --  139   K 3.3* 3.6  --  4.0  4.0    98  --  95   CO2 32* 33*  --  32*   BUN 49* 53*  --  61*   CREATININE 2.24* 2.47* 3.1* 3.27*   CALCIUM 8.5 8.1*  --  7.7*     No results for input(s): INR in the last 72 hours. Recent Labs     06/02/21  1745 06/03/21  0219 06/03/21  0908   TROPONINI 0.027* 0.033* 0.036*     Radiology:  XR CHEST PORTABLE   Final Result      STABLE CHEST COMPARED TO 6/4/2021. XR CHEST PORTABLE   Final Result      Interval improvement but persistence of bilateral pleural effusions and atelectasis and/or infiltrate. IR PICC WO SQ PORT/PUMP > 5 YEARS   Final Result      CTA Chest W WO  (PE study)   Final Result      Limited study as described. No CT evidence of pulmonary embolism within main, right, and left pulmonary arteries, and bilateral interlobar arteries. Segmental and subsegmental pulmonary arteries cannot be assessed. Small bilateral pleural effusions, right greater than left. Bilateral lower lobe collapse with bilateral subsegmental atelectasis/pneumonia, mid and upper lungs. Cardiomegaly. Bilateral thyroid nodules. Nonemergent thyroid sonography may be obtained for further evaluation if clinical concern warrants to rule out malignancy. All CT scans at this facility use dose modulation, iterative reconstruction, and/or weight based dosing when appropriate to reduce radiation dose to as low as reasonably achievable. XR CHEST PORTABLE   Final Result   Status post intubation and NG tube placement. There is an area of consolidation in the left lung base. There is possible left pleural effusion. Assessment/Plan:    1.  Acute hypoxic respiratory failure secondary to bilateral lower lobe collapse; consulting with pulmonology/intensivist; vent management per intensivist.    2. Chf: on iv diuresis, following with nephrology and cardiology  3. elev trop: following with cards  4. Jeremy: nippv, following with pulm and cardiology  5. DMII:  Q6H SSI while NPO  6. CKD:  Renally dose meds  7. Functional Status: Fall precautions. Up with assistance. PT OT  8. Diet: NPO  9.  DVT ppx: Lovenox SCDs    Paula Ordaz MD ,MD

## 2021-06-05 NOTE — PLAN OF CARE

## 2021-06-06 NOTE — PLAN OF CARE

## 2021-06-06 NOTE — PROGRESS NOTES
Nephrology Progress Note    Assessment:  CHARLES worsening non oliguric  DM type-2  Respiratory failure ventilated  Lymphedema legs history        Plan: diuresis another 24 hours if GFR worse will stop diuretic and cautiously hydrate  ECHO EF nl    Patient Active Problem List:     Venous stasis ulcer of right lower leg with edema of right lower leg (HCC)     Uncontrolled diabetes mellitus with complication, without long-term current use of insulin (HCC)     Morbid obesity (HCC)     Chronic kidney disease (CKD)     Non-pressure 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 layer exposed ([address]     Lymphedema of both lower extremities     Noncompliance with medication regimen     Shortness of breath     Bilateral carotid bruits     Dyslipidemia     Edema of right lower leg due to peripheral venous insufficiency     Acute respiratory failure with hypoxia (HCC)      Subjective:  Admit Date: 6/2/2021    Interval History: prone on ventilator sedated    Medications:  Scheduled Meds:   lactulose  20 g Oral TID    polyethylene glycol  17 g Per NG tube Daily    docusate  100 mg Oral BID    heparin (porcine)  5,000 Units Subcutaneous 3 times per day    sodium chloride flush  5-40 mL Intravenous 2 times per day    chlorhexidine  15 mL Mouth/Throat BID    famotidine (PEPCID) injection  20 mg Intravenous BID    aspirin  300 mg Rectal Daily    rosuvastatin  10 mg Oral Nightly    piperacillin-tazobactam  3,375 mg Intravenous Q8H    insulin lispro  0-6 Units Subcutaneous Q4H     Continuous Infusions:   propofol 50 mcg/kg/min (06/05/21 1854)    furosemide (LASIX) 1mg/ml infusion 10 mg/hr (06/05/21 1856)    sodium chloride      sodium chloride      fentaNYL (SUBLIMAZE) infusion 175 mcg/hr (06/05/21 1358)    dextrose         CBC:   Recent Labs     06/04/21  0600 06/04/21  1814 06/05/21  0600   WBC 10.1  --  10.2   HGB 10.3* 11.7* 10.8*     --  265     CMP:    Recent Labs     06/03/21  0504 06/04/21  0600 06/04/21 1814 06/05/21  0600    140  --  139   K 3.3* 3.6  --  4.0  4.0    98  --  95   CO2 32* 33*  --  32*   BUN 49* 53*  --  61*   CREATININE 2.24* 2.47* 3.1* 3.27*   GLUCOSE 63* 99  --  156*   CALCIUM 8.5 8.1*  --  7.7*   LABGLOM 28.9* 25.8* 20* 18.7*     Troponin:   Recent Labs     06/03/21  0908   TROPONINI 0.036*     BNP: No results for input(s): BNP in the last 72 hours. INR: No results for input(s): INR in the last 72 hours. Lipids:   Recent Labs     06/03/21  0504   CHOL 74   TRIG 120   HDL 22*     Liver: No results for input(s): AST, ALT, ALKPHOS, PROT, LABALBU, BILITOT in the last 72 hours. Invalid input(s): BILDIR  Iron:  No results for input(s): IRONS, FERRITIN in the last 72 hours. Invalid input(s): LABIRONS  Urinalysis: No results for input(s): UA in the last 72 hours. Objective:  Vitals: BP (!) 125/45   Pulse 63   Temp 98.2 °F (36.8 °C) (Rectal)   Resp 18   Ht 6' 1\" (1.854 m)   Wt (!) 411 lb 4.8 oz (186.6 kg)   SpO2 95%   BMI 54.26 kg/m²    Wt Readings from Last 3 Encounters:   06/05/21 (!) 411 lb 4.8 oz (186.6 kg)   11/12/20 (!) 338 lb (153.3 kg)   11/03/20 (!) 328 lb (148.8 kg)      24HR INTAKE/OUTPUT:      Intake/Output Summary (Last 24 hours) at 6/5/2021 2010  Last data filed at 6/5/2021 1714  Gross per 24 hour   Intake 3405.88 ml   Output 2900 ml   Net 505.88 ml       General: not alert, in no apparent distress sedated  HEENT: normocephalic, atraumatic, anicteric  Neck: supple, no mass  Lungs: non-labored respirations, clear to auscultation bilaterally  Heart: regular rate and rhythm, 2/6 systolic murmurs or rubs  Abdomen: soft, non-tender, non-distended  Ext: no cyanosis, 2+ peripheral edema lymph?   Neuro: alert and oriented, no gross abnormalities  Psych: normal mood and affect  Skin: no rash      Electronically signed by Jacqueline Collier DO, MD            .

## 2021-06-06 NOTE — PROGRESS NOTES
3.27* 3.39*   GLUCOSE 99  --  156* 194*   CALCIUM 8.1*  --  7.7* 8.3*   LABGLOM 25.8* 20* 18.7* 17.9*     Troponin: No results for input(s): TROPONINI in the last 72 hours. BNP: No results for input(s): BNP in the last 72 hours. INR: No results for input(s): INR in the last 72 hours. Lipids: No results for input(s): CHOL, LDLDIRECT, TRIG, HDL, AMYLASE, LIPASE in the last 72 hours. Liver: No results for input(s): AST, ALT, ALKPHOS, PROT, LABALBU, BILITOT in the last 72 hours. Invalid input(s): BILDIR  Iron:  No results for input(s): IRONS, FERRITIN in the last 72 hours. Invalid input(s): LABIRONS  Urinalysis: No results for input(s): UA in the last 72 hours.     Objective:  Vitals: BP (!) 155/49   Pulse 66   Temp 98.4 °F (36.9 °C) (Rectal)   Resp 18   Ht 6' 1\" (1.854 m)   Wt (!) 411 lb 4.8 oz (186.6 kg)   SpO2 98%   BMI 54.26 kg/m²    Wt Readings from Last 3 Encounters:   06/05/21 (!) 411 lb 4.8 oz (186.6 kg)   11/12/20 (!) 338 lb (153.3 kg)   11/03/20 (!) 328 lb (148.8 kg)      24HR INTAKE/OUTPUT:      Intake/Output Summary (Last 24 hours) at 6/6/2021 1108  Last data filed at 6/6/2021 0946  Gross per 24 hour   Intake 3083.88 ml   Output 4425 ml   Net -1341.12 ml       General:not alert, in no apparent distress  ventilator  HEENT: normocephalic, atraumatic, anicteric  Neck: supple, no mass  ET in place  Lungs: non-labored respirations, clear to auscultation bilaterally  Heart: regular rate and rhythm, no murmurs or rubs  Abdomen: soft, non-tender, non-distended  Ext: no cyanosis, 1+ peripheral edema lymphedema  Neuro: alert and oriented, no gross abnormalities  Psych: normal mood and affect  Skin: no rash      Electronically signed by Noreen Rodriguez DO, MD

## 2021-06-06 NOTE — PROGRESS NOTES
Progress Note  Patient: Hussein Melendrez  Unit/Bed: OS29/IZ17-34  YOB: 1950  MRN: [id number]  Acct: [de-identified]   Admitting Diagnosis: Acute respiratory failure with hypoxia [facility]) [J96.01]  Admit Date:  6/2/2021  Hospital Day: 4    Chief Complaint:  Resp failure     Histories:  Past Medical History:   Diagnosis Date    Hyperlipidemia     Type II or unspecified type diabetes mellitus without mention of complication, not stated as uncontrolled      Past Surgical History:   Procedure Laterality Date    CYSTOSCOPY  04/19/2017    FLEXIBLE W/COMPLEX CYSTOMETROGRAMEMG ANAL-COMPLEX UROFLOW-US PROSTATE    SHOULDER SURGERY      WRIST FUSION       Family History   Problem Relation Age of Onset    Prostate Cancer Father     Cancer Sister      Social History     Socioeconomic History    Marital status:      Spouse name: None    Number of children: None    Years of education: None    Highest education level: None   Occupational History    None   Tobacco Use    Smoking status: Former Smoker    Smokeless tobacco: Never Used   Substance and Sexual Activity    Alcohol use: Not Currently    Drug use: Never    Sexual activity: Not Currently   Other Topics Concern    None   Social History Narrative    None     Social Determinants of Health     Financial Resource Strain:     Difficulty of Paying Living Expenses:    Food Insecurity:     Worried About Running Out of Food in the Last Year:     Ran Out of Food in the Last Year:    Transportation Needs:     Lack of Transportation (Medical):      Lack of Transportation (Non-Medical):    Physical Activity:     Days of Exercise per Week:     Minutes of Exercise per Session:    Stress:     Feeling of Stress :    Social Connections:     Frequency of Communication with Friends and Family:     Frequency of Social Gatherings with Friends and Family:     Attends Holiness Services:     Active Member of Clubs or Organizations:     Attends Club or Organization Meetings:     Marital Status:    Intimate Partner Violence:     Fear of Current or Ex-Partner:     Emotionally Abused:     Physically Abused:     Sexually Abused:        Subjective/HPI pt is critically ill. On 100% FIO2 and 18 PEEP. sats 94% No Fever. GFR worse. Urine out put good but net negative balance is minimal.     EKG: SR 62    Review of Systems:   Review of Systems  vented    Physical Examination:    BP (!) 118/39   Pulse 62   Temp 98.4 °F (36.9 °C) (Oral)   Resp 18   Ht 6' 1\" (1.854 m)   Wt (!) 411 lb 4.8 oz (186.6 kg)   SpO2 94%   BMI 54.26 kg/m²    Physical Exam   Constitutional: No distress. He appears acutely ill. HENT:   Normal cephalic and Atraumatic   Eyes: Pupils are equal, round, and reactive to light. Neck: Thyroid normal. No JVD present. No neck adenopathy. No thyromegaly present. Cardiovascular: Regular rhythm, intact distal pulses and normal pulses. Bradycardia present. Murmur heard. Pulmonary/Chest: Effort normal and breath sounds normal. He has no wheezes. He has no rales. He exhibits no tenderness. Abdominal: Soft. Bowel sounds are normal. There is no abdominal tenderness. Musculoskeletal:         General: Edema present. No tenderness. Normal range of motion. Cervical back: Normal range of motion and neck supple. Neurological: He exhibits altered mental status. Skin: Skin is warm. No cyanosis. Nails show no clubbing.        LABS:  CBC:   Lab Results   Component Value Date    WBC 11.0 06/06/2021    RBC 4.58 06/06/2021    HGB 10.7 06/06/2021    HCT 34.4 06/06/2021    MCV 75.1 06/06/2021    MCH 23.3 06/06/2021    MCHC 31.0 06/06/2021    RDW 17.8 06/06/2021     06/06/2021     CBC with Differential:    Lab Results   Component Value Date    WBC 11.0 06/06/2021    RBC 4.58 06/06/2021    HGB 10.7 06/06/2021    HCT 34.4 06/06/2021     06/06/2021    MCV 75.1 06/06/2021    MCH 23.3 06/06/2021    MCHC 31.0 06/06/2021    RDW 17.8 06/06/2021 LYMPHOPCT 8.8 06/06/2021    MONOPCT 7.5 06/06/2021    BASOPCT 0.6 06/06/2021    MONOSABS 0.8 06/06/2021    LYMPHSABS 1.0 06/06/2021    EOSABS 0.2 06/06/2021    BASOSABS 0.1 06/06/2021     CMP:    Lab Results   Component Value Date     06/06/2021    K 4.2 06/06/2021    CL 90 06/06/2021    CO2 32 06/06/2021    BUN 62 06/06/2021    CREATININE 3.39 06/06/2021    GFRAA 21.7 06/06/2021    LABGLOM 17.9 06/06/2021    GLUCOSE 194 06/06/2021    PROT 7.6 06/02/2021    LABALBU 4.0 06/02/2021    CALCIUM 8.3 06/06/2021    BILITOT 0.3 06/02/2021    ALKPHOS 77 06/02/2021    AST 24 06/02/2021    ALT 27 06/02/2021     BMP:    Lab Results   Component Value Date     06/06/2021    K 4.2 06/06/2021    CL 90 06/06/2021    CO2 32 06/06/2021    BUN 62 06/06/2021    LABALBU 4.0 06/02/2021    CREATININE 3.39 06/06/2021    CALCIUM 8.3 06/06/2021    GFRAA 21.7 06/06/2021    LABGLOM 17.9 06/06/2021    GLUCOSE 194 06/06/2021     Magnesium:    Lab Results   Component Value Date    MG 2.1 06/03/2021     Troponin:    Lab Results   Component Value Date    TROPONINI 0.036 06/03/2021        Active Hospital Problems    Diagnosis Date Noted    Acute respiratory failure with hypoxia ([facility] [address] [J96.01] 06/02/2021     Priority: Low        Assessment/Plan:  1. Hypoxic Respiratory failure - vented  2. Volume overloaded - likely acute on Chronic DHF -Pt on Lasix gtt and Diuril per Nephrololgy. Follow labs. I/Os  3. Demand ischemia - ASA. No BB due to Bradycardia. 4. Echo EF 55 RVSP 32   5.  Likely ÓSCAR          Electronically signed by Madelyn Coleman MD on 6/6/2021 at 2:06 PM

## 2021-06-06 NOTE — PROGRESS NOTES
06/05/21  0600 06/06/21  0548     --  139 136   K 3.6  --  4.0  4.0 4.2   CL 98  --  95 90*   CO2 33*  --  32* 32*   BUN 53*  --  61* 62*   CREATININE 2.47* 3.1* 3.27* 3.39*   CALCIUM 8.1*  --  7.7* 8.3*     No results for input(s): INR in the last 72 hours. No results for input(s): Gordyola Najeramith in the last 72 hours. Radiology:  XR CHEST PORTABLE   Final Result      STABLE CHEST COMPARED TO 6/5/2021. XR CHEST PORTABLE   Final Result      STABLE CHEST COMPARED TO 6/4/2021. XR CHEST PORTABLE   Final Result      Interval improvement but persistence of bilateral pleural effusions and atelectasis and/or infiltrate. IR PICC WO SQ PORT/PUMP > 5 YEARS   Final Result      CTA Chest W WO  (PE study)   Final Result      Limited study as described. No CT evidence of pulmonary embolism within main, right, and left pulmonary arteries, and bilateral interlobar arteries. Segmental and subsegmental pulmonary arteries cannot be assessed. Small bilateral pleural effusions, right greater than left. Bilateral lower lobe collapse with bilateral subsegmental atelectasis/pneumonia, mid and upper lungs. Cardiomegaly. Bilateral thyroid nodules. Nonemergent thyroid sonography may be obtained for further evaluation if clinical concern warrants to rule out malignancy. All CT scans at this facility use dose modulation, iterative reconstruction, and/or weight based dosing when appropriate to reduce radiation dose to as low as reasonably achievable. XR CHEST PORTABLE   Final Result   Status post intubation and NG tube placement. There is an area of consolidation in the left lung base. There is possible left pleural effusion. Assessment/Plan:    1.  Acute hypoxic respiratory failure secondary to bilateral lower lobe collapse; consulting with pulmonology/intensivist; vent management per intensivist.    2. Chf: on iv diuresis, following with nephrology and cardiology  3. elev trop: following with cards  4. Jeremy: nippv, following with pulm and cardiology  5. DMII:  Q6H SSI while NPO  6. CKD:  Renally dose meds  7. Functional Status: Fall precautions. Up with assistance. PT OT  8. Diet: NPO  9.  DVT ppx: Lovenox SCDs    Estrellita Cano MD ,MD

## 2021-06-06 NOTE — PATIENT CARE CONFERENCE
[id number] Updated wife on plan of care.  Electronically signed by Merline Bucco, RN on 6/6/2021 at 9:49 AM

## 2021-06-06 NOTE — PROGRESS NOTES
2000 pt on vent. On Propofol, Fentanyl drips. In prone position. Large amount of yellow secretions coming from mouth and nose. Pt withdraws slightly to pain. Suctioned per ET tube for thick yellow mucous and orally for large amount yellow mucous. OG placement checked- at 60cm. 2200 Remains prone. 0000 Pts sheets are covered in yellow liquid which appears to be tube feeding. Odor is of tube feeding. Endo tube tape is saturated. Pt turned supine and ET tube tape changed per resp. therapy. OG placement checked- in correct position. While supine, sats dropped into the 60s so pt was returned to prone position. Tube feeding on hold at this time. 0400 Gold wedding band removed from finger due to swelling. Placed in specimen cup and placed in pt lock box.

## 2021-06-06 NOTE — PROGRESS NOTES
Pulmonary & Critical Care Medicine ICU Progress Note  Chief complaint : Acute respiratory failure    Subjunctive/24 hour events :   Patient seen and examined during multidisciplinary rounds with RN, charge nurse, RT, pharmacy, dietitian, and social service. Patient currently in prone position, sedated, while in prone position oxygenation is better, is currently on 80% FiO2 and 14 of PEEP with saturation 99%, weaning down as tolerated while prone with plan to supine later today. Urine output 3800 cc, he is net -700 cc, no fever, bowels moving, tolerates tube feed, no vomiting. Blood sugar is not controlled      Social History     Tobacco Use    Smoking status: Former Smoker    Smokeless tobacco: Never Used   Substance Use Topics    Alcohol use: Not Currently         Problem Relation Age of Onset    Prostate Cancer Father     Cancer Sister        Recent Labs     06/03/21  1018 06/04/21  1814   PHART 7.404 7.338*   NEV4XDS 53* 65*   PO2ART 63* 123*       MV Settings:  Vent Mode: AC/VC Rate Set: 18 bmp/Vt Ordered: 500 mL/ /FiO2 : 80 %           IV:   propofol 50 mcg/kg/min (06/06/21 0703)    furosemide (LASIX) 1mg/ml infusion 10 mg/hr (06/06/21 0722)    sodium chloride      sodium chloride      fentaNYL (SUBLIMAZE) infusion 175 mcg/hr (06/06/21 0307)    dextrose         Vitals:  BP (!) 164/53   Pulse 73   Temp 98.4 °F (36.9 °C) (Rectal)   Resp 18   Ht 6' 1\" (1.854 m)   Wt (!) 411 lb 4.8 oz (186.6 kg)   SpO2 99%   BMI 54.26 kg/m²    Tmax:        Intake/Output Summary (Last 24 hours) at 6/6/2021 0804  Last data filed at 6/6/2021 0753  Gross per 24 hour   Intake 3083.88 ml   Output 4550 ml   Net -1466.12 ml       EXAM:  General: On vent, sedated, unresponsive  Head: normocephalic, atraumatic  Eyes:No gross abnormalities.   ENT:  MMM no lesions, ET and OG tube  Neck:  supple and no masses  Chest : Good air movement, few rales bilaterally, no wheezing, nontender, tympanic  Heart[de-identified] Heart sounds are normal.  Regular rate and rhythm without murmur, gallop or rub. ABD:  symmetric, soft, non-tender, no apparent guarding or rebound  Musculoskeletal : no cyanosis, no clubbing and 1 + edema-    Neuro:  Sedated, unresponsive  Skin: No rashes or nodules noted. Lymph node:  no cervical nodes  Urology: Yes Carreno   Psychiatric: Sedated and calm    Medications:  Scheduled Meds:   polyethylene glycol  17 g Per NG tube Daily    docusate  100 mg Oral BID    heparin (porcine)  5,000 Units Subcutaneous 3 times per day    sodium chloride flush  5-40 mL Intravenous 2 times per day    chlorhexidine  15 mL Mouth/Throat BID    famotidine (PEPCID) injection  20 mg Intravenous BID    aspirin  300 mg Rectal Daily    rosuvastatin  10 mg Oral Nightly    piperacillin-tazobactam  3,375 mg Intravenous Q8H    insulin lispro  0-6 Units Subcutaneous Q4H       PRN Meds:  sodium chloride flush, sodium chloride, sodium chloride, acetaminophen **OR** acetaminophen, ondansetron **OR** ondansetron, nitroGLYCERIN, potassium chloride **OR** potassium alternative oral replacement **OR** potassium chloride, magnesium sulfate, ipratropium-albuterol, glucose, dextrose, glucagon (rDNA), dextrose    Results: reviewed by me   CBC:   Recent Labs     06/04/21  0600 06/04/21 1814 06/05/21 0600 06/06/21  0551   WBC 10.1  --  10.2 11.0*   HGB 10.3* 11.7* 10.8* 10.7*   HCT 33.5*  --  34.5* 34.4*   MCV 76.2*  --  76.4* 75.1*     --  265 259     BMP:   Recent Labs     06/03/21  1018 06/04/21  0600 06/04/21  1814 06/05/21 0600 06/06/21  0548   NA  --  140  --  139 136   K   < > 3.6  --  4.0  4.0 4.2   CL  --  98  --  95 90*   CO2  --  33*  --  32* 32*   BUN  --  53*  --  61* 62*   CREATININE  --  2.47* 3.1* 3.27* 3.39*    < > = values in this interval not displayed. LIVER PROFILE:   No results for input(s): AST, ALT, LIPASE, BILIDIR, BILITOT, ALKPHOS in the last 72 hours. Invalid input(s):   AMYLASE,  ALB  PT/INR:   No results for

## 2021-06-06 NOTE — PROGRESS NOTES
46 Am assessment completed as noted. Vss, will continue to monitor. Electronically signed by Adenike Carter RN on 6/6/2021 at 8:03 AM  1900 wife took wedding ring home.  Electronically signed by Adenike Carter RN on 6/6/2021 at 7:12 PM

## 2021-06-07 NOTE — PROGRESS NOTES
Hospitalist Progress Note      Date of Admission: 6/2/2021  Chief Complaint:    Chief Complaint   Patient presents with    Shortness of Breath     Subjective:  intubated      Medications:    Infusion Medications    propofol 40 mcg/kg/min (06/07/21 1247)    furosemide (LASIX) 1mg/ml infusion 10 mg/hr (06/07/21 1403)    sodium chloride      sodium chloride      fentaNYL (SUBLIMAZE) infusion 175 mcg/hr (06/07/21 1143)    dextrose       Scheduled Medications    insulin lispro  0-18 Units Subcutaneous Q4H    isosorbide mononitrate  60 mg Oral Daily    hydrALAZINE  50 mg Oral 2 times per day    polyethylene glycol  17 g Per NG tube Daily    docusate  100 mg Oral BID    heparin (porcine)  5,000 Units Subcutaneous 3 times per day    sodium chloride flush  5-40 mL Intravenous 2 times per day    chlorhexidine  15 mL Mouth/Throat BID    famotidine (PEPCID) injection  20 mg Intravenous BID    aspirin  300 mg Rectal Daily    rosuvastatin  10 mg Oral Nightly     PRN Meds: metoprolol, hydrALAZINE, sodium chloride flush, sodium chloride, sodium chloride, acetaminophen **OR** acetaminophen, ondansetron **OR** ondansetron, nitroGLYCERIN, potassium chloride **OR** potassium alternative oral replacement **OR** potassium chloride, magnesium sulfate, ipratropium-albuterol, glucose, dextrose, glucagon (rDNA), dextrose    Intake/Output Summary (Last 24 hours) at 6/7/2021 1428  Last data filed at 6/7/2021 1158  Gross per 24 hour   Intake 3207.87 ml   Output 2300 ml   Net 907.87 ml     Exam:  BP (!) 147/44   Pulse 74   Temp 98.8 °F (37.1 °C)   Resp 20   Ht 6' 1\" (1.854 m)   Wt (!) 411 lb 4.8 oz (186.6 kg)   SpO2 (!) 87%   BMI 54.26 kg/m²   Head: Normocephalic, atraumatic  Sclera clear  Neck JVD flat  Lungs: scattered crackles    Labs:   Recent Labs     06/05/21  0600 06/06/21  0551 06/07/21  0600   WBC 10.2 11.0* 10.7   HGB 10.8* 10.7* 10.7*   HCT 34.5* 34.4* 33.3*    259 262     Recent Labs     06/05/21  0600 06/06/21  0548 06/07/21  0600    136 138   K 4.0  4.0 4.2 4.1   CL 95 90* 89*   CO2 32* 32* 32*   BUN 61* 62* 69*   CREATININE 3.27* 3.39* 3.40*   CALCIUM 7.7* 8.3* 8.6     No results for input(s): INR in the last 72 hours. No results for input(s): Everrett Powell in the last 72 hours. Radiology:  XR CHEST PORTABLE   Final Result      STABLE CHEST COMPARED TO 6/5/2021. XR CHEST PORTABLE   Final Result      STABLE CHEST COMPARED TO 6/4/2021. XR CHEST PORTABLE   Final Result      Interval improvement but persistence of bilateral pleural effusions and atelectasis and/or infiltrate. IR PICC WO SQ PORT/PUMP > 5 YEARS   Final Result      CTA Chest W WO  (PE study)   Final Result      Limited study as described. No CT evidence of pulmonary embolism within main, right, and left pulmonary arteries, and bilateral interlobar arteries. Segmental and subsegmental pulmonary arteries cannot be assessed. Small bilateral pleural effusions, right greater than left. Bilateral lower lobe collapse with bilateral subsegmental atelectasis/pneumonia, mid and upper lungs. Cardiomegaly. Bilateral thyroid nodules. Nonemergent thyroid sonography may be obtained for further evaluation if clinical concern warrants to rule out malignancy. All CT scans at this facility use dose modulation, iterative reconstruction, and/or weight based dosing when appropriate to reduce radiation dose to as low as reasonably achievable. XR CHEST PORTABLE   Final Result   Status post intubation and NG tube placement. There is an area of consolidation in the left lung base. There is possible left pleural effusion. XR CHEST PORTABLE    (Results Pending)     Assessment/Plan:    1.  Acute hypoxic respiratory failure secondary to bilateral lower lobe collapse; consulting with pulmonology/intensivist; vent management per intensivist.    2. Chf: on iv diuresis, following

## 2021-06-07 NOTE — PROGRESS NOTES
Nephrology Progress Note    Assessment:  CHARLES non oliguric  No absolute criteria for dialyisis yet   Hypoxia improved with ventilator  Anemia  Obesity  Hx Lymphedema  Hypertension     Plan: maintain diuresis will discuss with Dr Fofana Covert need to continue  BP elevated will stop antibiotic no evidence of infection  Antibiotic and C_pAP 16 contributing to renal issue    Patient Active Problem List:     Venous stasis ulcer of right lower leg with edema of right lower leg ([address]     Uncontrolled diabetes mellitus with complication, without long-term current use of insulin (HCC)     Morbid obesity (HCC)     Chronic kidney disease (CKD)     Non-pressure 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 layer exposed ([address]     Lymphedema of both lower extremities     Noncompliance with medication regimen     Shortness of breath     Bilateral carotid bruits     Dyslipidemia     Edema of right lower leg due to peripheral venous insufficiency     Acute respiratory failure with hypoxia (HCC)      Subjective:  Admit Date: 6/2/2021    Interval History: sedated    Medications:  Scheduled Meds:   insulin lispro  0-6 Units Subcutaneous Q4H    polyethylene glycol  17 g Per NG tube Daily    docusate  100 mg Oral BID    heparin (porcine)  5,000 Units Subcutaneous 3 times per day    sodium chloride flush  5-40 mL Intravenous 2 times per day    chlorhexidine  15 mL Mouth/Throat BID    famotidine (PEPCID) injection  20 mg Intravenous BID    aspirin  300 mg Rectal Daily    rosuvastatin  10 mg Oral Nightly    piperacillin-tazobactam  3,375 mg Intravenous Q8H     Continuous Infusions:   propofol 40 mcg/kg/min (06/07/21 0621)    furosemide (LASIX) 1mg/ml infusion 10 mg/hr (06/07/21 0416)    sodium chloride      sodium chloride      fentaNYL (SUBLIMAZE) infusion 175 mcg/hr (06/07/21 0708)    dextrose         CBC:   Recent Labs     06/06/21  0551 06/07/21  0600   WBC 11.0* 10.7   HGB 10.7* 10.7*    262     CMP:    Recent Labs     06/05/21

## 2021-06-07 NOTE — PROGRESS NOTES
Progress Note  Patient: Zainab Song  Unit/Bed: TZ55/XW52-64  YOB: 1950  MRN: [id number]  Acct: [de-identified]   Admitting Diagnosis: Acute respiratory failure with hypoxia [facility]) [J96.01]  Admit Date:  6/2/2021  Hospital Day: 5    Chief Complaint:  Resp failure     Histories:  Past Medical History:   Diagnosis Date    Hyperlipidemia     Type II or unspecified type diabetes mellitus without mention of complication, not stated as uncontrolled      Past Surgical History:   Procedure Laterality Date    CYSTOSCOPY  04/19/2017    FLEXIBLE W/COMPLEX CYSTOMETROGRAMEMG ANAL-COMPLEX UROFLOW-US PROSTATE    SHOULDER SURGERY      WRIST FUSION       Family History   Problem Relation Age of Onset    Prostate Cancer Father     Cancer Sister      Social History     Socioeconomic History    Marital status:      Spouse name: None    Number of children: None    Years of education: None    Highest education level: None   Occupational History    None   Tobacco Use    Smoking status: Former Smoker    Smokeless tobacco: Never Used   Substance and Sexual Activity    Alcohol use: Not Currently    Drug use: Never    Sexual activity: Not Currently   Other Topics Concern    None   Social History Narrative    None     Social Determinants of Health     Financial Resource Strain:     Difficulty of Paying Living Expenses:    Food Insecurity:     Worried About Running Out of Food in the Last Year:     Ran Out of Food in the Last Year:    Transportation Needs:     Lack of Transportation (Medical):      Lack of Transportation (Non-Medical):    Physical Activity:     Days of Exercise per Week:     Minutes of Exercise per Session:    Stress:     Feeling of Stress :    Social Connections:     Frequency of Communication with Friends and Family:     Frequency of Social Gatherings with Friends and Family:     Attends [employer] Services:     Active Member of Clubs or Organizations:     Attends Club or Organization Meetings:     Marital Status:    Intimate Partner Violence:     Fear of Current or Ex-Partner:     Emotionally Abused:     Physically Abused:     Sexually Abused:        Subjective/HPI pt is critically ill. On 70% FIO2 and 14 PEEP. sats 99% No Fever. Urine out put good but net negative balance is minimal.     EKG: SR 62    Review of Systems:   Review of Systems  vented    Physical Examination:    BP (!) 187/63   Pulse 79   Temp 98.8 °F (37.1 °C) (Rectal)   Resp 19   Ht 6' 1\" (1.854 m)   Wt (!) 411 lb 4.8 oz (186.6 kg)   SpO2 99%   BMI 54.26 kg/m²    Physical Exam   Constitutional: No distress. He appears acutely ill. HENT:   Normal cephalic and Atraumatic   Eyes: Pupils are equal, round, and reactive to light. Neck: Thyroid normal. No JVD present. No neck adenopathy. No thyromegaly present. Cardiovascular: Regular rhythm, intact distal pulses and normal pulses. Bradycardia present. Murmur heard. Pulmonary/Chest: Effort normal and breath sounds normal. He has no wheezes. He has no rales. He exhibits no tenderness. Abdominal: Soft. Bowel sounds are normal. There is no abdominal tenderness. Musculoskeletal:         General: Edema present. No tenderness. Normal range of motion. Cervical back: Normal range of motion and neck supple. Neurological: He exhibits altered mental status. Skin: Skin is warm. No cyanosis. Nails show no clubbing.        LABS:  CBC:   Lab Results   Component Value Date    WBC 10.7 06/07/2021    RBC 4.48 06/07/2021    HGB 10.7 06/07/2021    HCT 33.3 06/07/2021    MCV 74.4 06/07/2021    MCH 23.9 06/07/2021    MCHC 32.1 06/07/2021    RDW 17.6 06/07/2021     06/07/2021     CBC with Differential:    Lab Results   Component Value Date    WBC 10.7 06/07/2021    RBC 4.48 06/07/2021    HGB 10.7 06/07/2021    HCT 33.3 06/07/2021     06/07/2021    MCV 74.4 06/07/2021    MCH 23.9 06/07/2021    MCHC 32.1 06/07/2021    RDW 17.6 06/07/2021    LYMPHOPCT 10.0 06/07/2021    MONOPCT 3.0 06/07/2021    BASOPCT 1.0 06/07/2021    MONOSABS 0.3 06/07/2021    LYMPHSABS 1.1 06/07/2021    EOSABS 0.4 06/07/2021    BASOSABS 0.1 06/07/2021     CMP:    Lab Results   Component Value Date     06/07/2021    K 4.1 06/07/2021    CL 89 06/07/2021    CO2 32 06/07/2021    BUN 69 06/07/2021    CREATININE 3.40 06/07/2021    GFRAA 21.6 06/07/2021    LABGLOM 17.9 06/07/2021    GLUCOSE 258 06/07/2021    PROT 7.6 06/02/2021    LABALBU 4.0 06/02/2021    CALCIUM 8.6 06/07/2021    BILITOT 0.3 06/02/2021    ALKPHOS 77 06/02/2021    AST 24 06/02/2021    ALT 27 06/02/2021     BMP:    Lab Results   Component Value Date     06/07/2021    K 4.1 06/07/2021    CL 89 06/07/2021    CO2 32 06/07/2021    BUN 69 06/07/2021    LABALBU 4.0 06/02/2021    CREATININE 3.40 06/07/2021    CALCIUM 8.6 06/07/2021    GFRAA 21.6 06/07/2021    LABGLOM 17.9 06/07/2021    GLUCOSE 258 06/07/2021     Magnesium:    Lab Results   Component Value Date    MG 2.1 06/03/2021     Troponin:    Lab Results   Component Value Date    TROPONINI 0.036 06/03/2021        Active Hospital Problems    Diagnosis Date Noted    Acute respiratory failure with hypoxia ([facility] [address] [J96.01] 06/02/2021     Priority: Low        Assessment/Plan:  1. Hypoxic Respiratory failure - vented  2. Volume overloaded - likely acute on Chronic DHF -Pt on Lasix gtt and Diuril per Nephrololgy. Follow labs. I/Os  3. HTN- add Imdur and Hydralazine per OG - for Afterload reduction. 4. Demand ischemia - ASA. No BB due to Bradycardia. 5. Echo EF 55 RVSP 32   6.  Likely ÓSCAR          Electronically signed by Hailey Smith MD on 6/7/2021 at 9:19 AM

## 2021-06-07 NOTE — PROGRESS NOTES
0800- Patient care assumed - remains sedated on ventilator in prone position. Assessment performed and charted per yessy. Wife at bedside @ approx 0900- updated on patient condition. Blood pressure was elevated, gave PRN Hydralazine 10mg.  New orders for BP meds rec'd from Dr. Unique Isbell. Plan to place patient supine @11am

## 2021-06-07 NOTE — CARE COORDINATION
ICU team quality rounds done this am and patients wife at bedside. Pt continues on vent with proning and fio2 100% when on his back. Pt unable to wean at this time and remains sedated and on TF. We will continue to follow for dc needs/plan.

## 2021-06-07 NOTE — PROGRESS NOTES
Pulmonary & Critical Care Medicine ICU Progress Note    Subjective:     No overnight events reported. He was noted to have some hypertension and was started on as needed medications this morning. He does remain in the prone position. He is due to be placed in the supine position around 11 AM this morning.     EXAM:  General: Sedated on the support of the ventilator, in the prone position  HEENT: Normocephalic, atraumatic, oral ETT and OG in place, exam limited by prone position  Lungs : Exam limited by prone position, diminished breath sounds bilaterally, no wheezes, no rhonchi, no ventilator dyssynchrony  Heart: Regular rate, exam limited by prone position  ABD: Obese, positive bowel sounds but decreased, exam limited by prone position  Extremities : Warm, dry, edema noted  Neuro: Sedated on propofol and fentanyl drips  Skin: No rashes appreciated, exam limited by prone position    MV Settings:  Vent Mode: AC/VC Rate Set: 18 bmp/Vt Ordered: 500 mL/ /FiO2 : 70 %     Recent Labs     06/04/21  1814   PHART 7.338*   LLY4DGD 65*   PO2ART 123*         IV:   propofol 40 mcg/kg/min (06/07/21 0621)    furosemide (LASIX) 1mg/ml infusion 10 mg/hr (06/07/21 0416)    sodium chloride      sodium chloride      fentaNYL (SUBLIMAZE) infusion 175 mcg/hr (06/07/21 0708)    dextrose         Vitals:  BP (!) 187/63   Pulse 79   Temp 98.8 °F (37.1 °C) (Rectal)   Resp 19   Ht 6' 1\" (1.854 m)   Wt (!) 411 lb 4.8 oz (186.6 kg)   SpO2 99%   BMI 54.26 kg/m²          Intake/Output Summary (Last 24 hours) at 6/7/2021 0828  Last data filed at 6/7/2021 0600  Gross per 24 hour   Intake 3157.87 ml   Output 2700 ml   Net 457.87 ml       Medications:  Scheduled Meds:   insulin lispro  0-6 Units Subcutaneous Q4H    polyethylene glycol  17 g Per NG tube Daily    docusate  100 mg Oral BID    heparin (porcine)  5,000 Units Subcutaneous 3 times per day    sodium chloride flush  5-40 mL Intravenous 2 times per day    chlorhexidine  15 mL Mouth/Throat BID    famotidine (PEPCID) injection  20 mg Intravenous BID    aspirin  300 mg Rectal Daily    rosuvastatin  10 mg Oral Nightly       Labs:   CBC:   Recent Labs     06/05/21  0600 06/06/21  0551 06/07/21  0600   WBC 10.2 11.0* 10.7   HGB 10.8* 10.7* 10.7*   HCT 34.5* 34.4* 33.3*   MCV 76.4* 75.1* 74.4*    259 262     BMP:   Recent Labs     06/05/21  0600 06/06/21  0548 06/07/21  0600    136 138   K 4.0  4.0 4.2 4.1   CL 95 90* 89*   CO2 32* 32* 32*   BUN 61* 62* 69*   CREATININE 3.27* 3.39* 3.40*     LIVER PROFILE: No results for input(s): AST, ALT, LIPASE, BILIDIR, BILITOT, ALKPHOS in the last 72 hours. Invalid input(s): AMYLASE,  ALB  PT/INR: No results for input(s): PROTIME, INR in the last 72 hours. APTT: No results for input(s): APTT in the last 72 hours. UA:No results for input(s): NITRITE, COLORU, PHUR, LABCAST, WBCUA, RBCUA, MUCUS, TRICHOMONAS, YEAST, BACTERIA, CLARITYU, SPECGRAV, LEUKOCYTESUR, UROBILINOGEN, BILIRUBINUR, BLOODU, GLUCOSEU, AMORPHOUS in the last 72 hours. Invalid input(s): KETONESU      Assessment/Plan:    1. Acute on chronic hypoxic and hypercapnic respiratory failure-patient does remain in the prone position at this time. He is scheduled to return to the supine position around 11 AM this morning. A blood gas will be obtained to ensure that he is currently adequately oxygenating and ventilating especially given his high ventilator settings. He was previously empirically on Zosyn, however after discussion with nephrology this morning these were discontinued. His procalcitonin was negative upon presentation. He does not have a fever or an elevation in his white blood cell count. If he does spike a fever or elevation of his white blood cell count, then repeat cultures can be obtained and antibiotics can be reinitiated at that time. 2. Volume overload-he currently is being diuresed with a Lasix drip. Nephrology does continue to follow.     3. Acute kidney injury on chronic kidney disease-nephrology does continue to follow. He currently is receiving Lasix drip. Patient does have known chronic lymphedema. 4. Demand ischemia-cardiology does continue to follow. The patient currently is receiving aspirin. Beta-blockers are on hold at this time secondary to bradycardia upon presentation. Nutrition: Tolerating tube feeds    Prophylaxis: Pepcid for GI prophylaxis. Heparin subcu for DVT prophylaxis. Code Status: Full code    This is a 65 y.o. male who is critically ill secondary to respiratory failure. I have spent a total of 40 minutes of critical care time with this patient, review of the chart, and discussion with the bedside staff; excluding any billable procedures. I was able to speak with patient's wife at the bedside this morning during rounds. Plan of care was discussed and questions were answered. Updates were given.     Electronically signed by Marlin Tavarez DO, on 6/7/2021 at 8:28 AM

## 2021-06-07 NOTE — PLAN OF CARE

## 2021-06-08 NOTE — PROGRESS NOTES
continue to follow. He currently is receiving Lasix drip. Patient does have known chronic lymphedema. His creatinine has stabilized. 4. Demand ischemia-cardiology does continue to follow. The patient currently is receiving aspirin. Beta-blockers are on hold at this time secondary to bradycardia upon presentation. Hydralazine and isosorbide were added yesterday. 5. Diabetes mellitus with hyperglycemia -he did have some low blood sugars upon presentation, however now his blood sugars are elevated. Lantus was added back to his regimen today albeit at a lower dose. His sliding scale insulin coverage was increased yesterday. Nutrition: Tolerating tube feeds    Prophylaxis: Pepcid for GI prophylaxis. Heparin subcu for DVT prophylaxis. Code Status: Full code    This is a 65 y.o. male who is critically ill secondary to respiratory failure. I have spent a total of 42 minutes of critical care time with this patient, review of the chart, and discussion with the bedside staff; excluding any billable procedures.     Electronically signed by Viktor Peacock DO, on 6/8/2021 at 8:30 AM

## 2021-06-08 NOTE — PLAN OF CARE
Levels of oxygenation will improve  Description: Levels of oxygenation will improve  Outcome: Ongoing     Problem: Mental Status - Impaired:  Goal: Mental status will be restored to baseline  Description: Mental status will be restored to baseline  Outcome: Ongoing     Problem: Nutrition Deficit:  Goal: Ability to achieve adequate nutritional intake will improve  Description: Ability to achieve adequate nutritional intake will improve  Outcome: Ongoing     Problem: Pain:  Goal: Pain level will decrease  Description: Pain level will decrease  Outcome: Ongoing  Goal: Recognizes and communicates pain  Description: Recognizes and communicates pain  Outcome: Ongoing  Goal: Control of acute pain  Description: Control of acute pain  Outcome: Ongoing  Goal: Control of chronic pain  Description: Control of chronic pain  Outcome: Ongoing     Problem: Serum Glucose Level - Abnormal:  Goal: Ability to maintain appropriate glucose levels will improve to within specified parameters  Description: Ability to maintain appropriate glucose levels will improve to within specified parameters  Outcome: Ongoing     Problem: Skin Integrity - Impaired:  Goal: Will show no infection signs and symptoms  Description: Will show no infection signs and symptoms  Outcome: Ongoing  Goal: Absence of new skin breakdown  Description: Absence of new skin breakdown  Outcome: Ongoing     Problem: Sleep Pattern Disturbance:  Goal: Appears well-rested  Description: Appears well-rested  Outcome: Ongoing     Problem: Tissue Perfusion, Altered:  Goal: Circulatory function within specified parameters  Description: Circulatory function within specified parameters  Outcome: Ongoing     Problem: Tissue Perfusion - Cardiopulmonary, Altered:  Goal: Absence of angina  Description: Absence of angina  Outcome: Ongoing  Goal: Hemodynamic stability will improve  Description: Hemodynamic stability will improve  Outcome: Ongoing     Problem: Nutrition  Goal: Optimal nutrition therapy  Outcome: Ongoing

## 2021-06-08 NOTE — PROGRESS NOTES
Progress Note  Patient: Anurag Deutsch  Unit/Bed: OO18/OM83-42  YOB: 1950  MRN: [id number]  Acct: [de-identified]   Admitting Diagnosis: Acute respiratory failure with hypoxia [facility]) [J96.01]  Admit Date:  6/2/2021  Hospital Day: 6    Chief Complaint:  Resp failure     Histories:  Past Medical History:   Diagnosis Date    Hyperlipidemia     Type II or unspecified type diabetes mellitus without mention of complication, not stated as uncontrolled      Past Surgical History:   Procedure Laterality Date    CYSTOSCOPY  04/19/2017    FLEXIBLE W/COMPLEX CYSTOMETROGRAMEMG ANAL-COMPLEX UROFLOW-US PROSTATE    SHOULDER SURGERY      WRIST FUSION       Family History   Problem Relation Age of Onset    Prostate Cancer Father     Cancer Sister      Social History     Socioeconomic History    Marital status:      Spouse name: None    Number of children: None    Years of education: None    Highest education level: None   Occupational History    None   Tobacco Use    Smoking status: Former Smoker    Smokeless tobacco: Never Used   Substance and Sexual Activity    Alcohol use: Not Currently    Drug use: Never    Sexual activity: Not Currently   Other Topics Concern    None   Social History Narrative    None     Social Determinants of Health     Financial Resource Strain:     Difficulty of Paying Living Expenses:    Food Insecurity:     Worried About Running Out of Food in the Last Year:     Ran Out of Food in the Last Year:    Transportation Needs:     Lack of Transportation (Medical):      Lack of Transportation (Non-Medical):    Physical Activity:     Days of Exercise per Week:     Minutes of Exercise per Session:    Stress:     Feeling of Stress :    Social Connections:     Frequency of Communication with Friends and Family:     Frequency of Social Gatherings with Friends and Family:     Attends [employer] Services:     Active Member of Clubs or Organizations:     Attends Club or Organization Meetings:     Marital Status:    Intimate Partner Violence:     Fear of Current or Ex-Partner:     Emotionally Abused:     Physically Abused:     Sexually Abused:        Subjective/HPI pt is critically ill. Back up to  100% FIO2 and 14 PEEP. sats 95% No Fever. Urine out put good but net negative balance is minimal.     EKG: SR 62    Review of Systems:   Review of Systems  vented    Physical Examination:    BP (!) 165/59   Pulse 76   Temp 98.6 °F (37 °C) (Rectal)   Resp 18   Ht 6' 1\" (1.854 m)   Wt (!) 405 lb 6.4 oz (183.9 kg)   SpO2 95%   BMI 53.49 kg/m²    Physical Exam   Constitutional: No distress. He appears acutely ill. HENT:   Normal cephalic and Atraumatic   Eyes: Pupils are equal, round, and reactive to light. Neck: Thyroid normal. No JVD present. No neck adenopathy. No thyromegaly present. Cardiovascular: Regular rhythm, intact distal pulses and normal pulses. Bradycardia present. Murmur heard. Pulmonary/Chest: Effort normal and breath sounds normal. He has no wheezes. He has no rales. He exhibits no tenderness. Abdominal: Soft. Bowel sounds are normal. There is no abdominal tenderness. Musculoskeletal:         General: Edema present. No tenderness. Normal range of motion. Cervical back: Normal range of motion and neck supple. Neurological: He exhibits altered mental status. Skin: Skin is warm. No cyanosis. Nails show no clubbing.        LABS:  CBC:   Lab Results   Component Value Date    WBC 9.9 06/08/2021    RBC 4.63 06/08/2021    HGB 10.7 06/08/2021    HCT 34.7 06/08/2021    MCV 75.1 06/08/2021    MCH 23.1 06/08/2021    MCHC 30.8 06/08/2021    RDW 17.4 06/08/2021     06/08/2021     CBC with Differential:    Lab Results   Component Value Date    WBC 9.9 06/08/2021    RBC 4.63 06/08/2021    HGB 10.7 06/08/2021    HCT 34.7 06/08/2021     06/08/2021    MCV 75.1 06/08/2021    MCH 23.1 06/08/2021    MCHC 30.8 06/08/2021    RDW 17.4 06/08/2021 LYMPHOPCT 10.2 06/08/2021    MONOPCT 11.6 06/08/2021    BASOPCT 0.5 06/08/2021    MONOSABS 1.1 06/08/2021    LYMPHSABS 1.0 06/08/2021    EOSABS 0.4 06/08/2021    BASOSABS 0.0 06/08/2021     CMP:    Lab Results   Component Value Date     06/08/2021    K 3.8 06/08/2021    CL 86 06/08/2021    CO2 35 06/08/2021    BUN 75 06/08/2021    CREATININE 3.27 06/08/2021    GFRAA 22.6 06/08/2021    LABGLOM 18.7 06/08/2021    GLUCOSE 273 06/08/2021    PROT 7.6 06/02/2021    LABALBU 4.0 06/02/2021    CALCIUM 8.7 06/08/2021    BILITOT 0.3 06/02/2021    ALKPHOS 77 06/02/2021    AST 24 06/02/2021    ALT 27 06/02/2021     BMP:    Lab Results   Component Value Date     06/08/2021    K 3.8 06/08/2021    CL 86 06/08/2021    CO2 35 06/08/2021    BUN 75 06/08/2021    LABALBU 4.0 06/02/2021    CREATININE 3.27 06/08/2021    CALCIUM 8.7 06/08/2021    GFRAA 22.6 06/08/2021    LABGLOM 18.7 06/08/2021    GLUCOSE 273 06/08/2021     Magnesium:    Lab Results   Component Value Date    MG 2.1 06/03/2021     Troponin:    Lab Results   Component Value Date    TROPONINI 0.036 06/03/2021        Active Hospital Problems    Diagnosis Date Noted    Acute respiratory failure with hypoxia ([facility] [address] [J96.01] 06/02/2021     Priority: Low        Assessment/Plan:  1. Hypoxic Respiratory failure - vented  2. Volume overloaded - likely acute on Chronic DHF -Pt on Lasix gtt and Diuril per Nephrololgy. Added Metolazone today. Follow labs. I/Os  3. HTN- added Isosorbide Dinitrate and Hydralazine per OG - for Afterload reduction. 4. Demand ischemia - ASA. No BB due to Bradycardia. 5. Echo EF 55 RVSP 32   6.  Likely ÓSCAR          Electronically signed by Dontrell Horn MD on 6/8/2021 at 10:49 AM

## 2021-06-08 NOTE — PROGRESS NOTES
19:00-07:30 summary:     Pt remains on vent and on Lasix, propofol and fentanyl drips, pt tolerating well. Pt currently in prone position until 11:00 am on 06/08/2021 and is tolerating well. Assessment completed (see flowsheets). OG placement 60cm at the teeth. Placement verified via external length, respiratory status, pH and residual. Pt tolerating T/F infusion well. Bedside handoff report given to on coming RN. Safety measures in place.

## 2021-06-08 NOTE — PROGRESS NOTES
Renal Progress Note    Assessment and Plan:    65y.o. year old male with history s/f T2DM, HLD who presented for worsening SOB for 2 weeks. Pt currently intubated in the ICU. Nephrology consulted for CHARLES on CKD. Remains on high amount of O2.      1. CHARLES on CKD: most likely 2/2 contrast induced nephropathy (had CTPA on 6/2) , however function probably already low in setting of bradycardia, baseline Scr ~1.3? (was this in 11/20)  2. Acute on chronic hypoxic/hypercapnic respiratory failure  3. Hypokalemia  4. Anemia    Plan: Add metolazone 5 daily and kcl 20 bid  Cont lasix drip. Need to get negative fluid balance  Renal u/s and basic UA    Patient Active Problem List:     Venous stasis ulcer of right lower leg with edema of right lower leg ([address]     Uncontrolled diabetes mellitus with complication, without long-term current use of insulin (HCC)     Morbid obesity (HCC)     Chronic kidney disease (CKD)     Non-pressure 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 layer exposed ([address]     Lymphedema of both lower extremities     Noncompliance with medication regimen     Shortness of breath     Bilateral carotid bruits     Dyslipidemia     Edema of right lower leg due to peripheral venous insufficiency     Acute respiratory failure with hypoxia (HCC)      Subjective:   Admit Date: 6/2/2021    Interval History: on lasix drip. Remains hypoxic on 90% fio2. On tube feeds.         Medications:   Scheduled Meds:   insulin glargine  20 Units Subcutaneous Daily    insulin lispro  0-18 Units Subcutaneous Q4H    isosorbide mononitrate  60 mg Oral Daily    hydrALAZINE  50 mg Oral 2 times per day    polyethylene glycol  17 g Per NG tube Daily    docusate  100 mg Oral BID    heparin (porcine)  5,000 Units Subcutaneous 3 times per day    sodium chloride flush  5-40 mL Intravenous 2 times per day    chlorhexidine  15 mL Mouth/Throat BID    famotidine (PEPCID) injection  20 mg Intravenous BID    aspirin  300 mg Rectal Daily    rosuvastatin  10 mg Oral Nightly     Continuous Infusions:   propofol 40 mcg/kg/min (06/08/21 0754)    furosemide (LASIX) 1mg/ml infusion 10 mg/hr (06/08/21 0620)    sodium chloride      sodium chloride      fentaNYL (SUBLIMAZE) infusion 175 mcg/hr (06/08/21 0620)    dextrose         CBC:   Recent Labs     06/07/21  0600 06/07/21 1918 06/08/21 0600   WBC 10.7  --  9.9   HGB 10.7* 11.9* 10.7*     --  287     CMP:    Recent Labs     06/06/21  0548 06/07/21  0600 06/07/21 1918 06/08/21 0600    138  --  135   K 4.2 4.1  --  3.8   CL 90* 89*  --  86*   CO2 32* 32*  --  35*   BUN 62* 69*  --  75*   CREATININE 3.39* 3.40* 3.7* 3.27*   GLUCOSE 194* 258*  --  273*   CALCIUM 8.3* 8.6  --  8.7   LABGLOM 17.9* 17.9* 16* 18.7*     Troponin: No results for input(s): TROPONINI in the last 72 hours. BNP: No results for input(s): BNP in the last 72 hours. INR: No results for input(s): INR in the last 72 hours. Lipids: No results for input(s): CHOL, LDLDIRECT, TRIG, HDL, AMYLASE, LIPASE in the last 72 hours. Liver: No results for input(s): AST, ALT, ALKPHOS, PROT, LABALBU, BILITOT in the last 72 hours. Invalid input(s): BILDIR  Iron:  No results for input(s): IRONS, FERRITIN in the last 72 hours. Invalid input(s): LABIRONS  Urinalysis: No results for input(s): UA in the last 72 hours. Objective:   Vitals: BP (!) 165/59   Pulse 76   Temp 98.6 °F (37 °C) (Rectal)   Resp 18   Ht 6' 1\" (1.854 m)   Wt (!) 405 lb 6.4 oz (183.9 kg)   SpO2 95%   BMI 53.49 kg/m²    Wt Readings from Last 3 Encounters:   06/08/21 (!) 405 lb 6.4 oz (183.9 kg)   11/12/20 (!) 338 lb (153.3 kg)   11/03/20 (!) 328 lb (148.8 kg)      24HR INTAKE/OUTPUT:      Intake/Output Summary (Last 24 hours) at 6/8/2021 0834  Last data filed at 6/8/2021 0600  Gross per 24 hour   Intake 2673 ml   Output 2350 ml   Net 323 ml       Constitutional:  Intubated, sedated  Skin:normal, no rash  HEENT:sclera anicteric.   Head atraumatic

## 2021-06-09 NOTE — PROGRESS NOTES
Hospitalist Progress Note      Date of Admission: 6/2/2021  Chief Complaint:    Chief Complaint   Patient presents with    Shortness of Breath     Subjective:  intubated      Medications:    Infusion Medications    propofol 40 mcg/kg/min (06/08/21 1525)    furosemide (LASIX) 1mg/ml infusion 10 mg/hr (06/08/21 1653)    sodium chloride      sodium chloride      fentaNYL (SUBLIMAZE) infusion 175 mcg/hr (06/08/21 1222)    dextrose       Scheduled Medications    insulin glargine  20 Units Subcutaneous Daily    aspirin  324 mg Oral Daily    isosorbide dinitrate  20 mg Oral TID    potassium chloride  20 mEq Oral BID WC    metOLazone  5 mg Oral Daily    insulin lispro  0-18 Units Subcutaneous Q4H    hydrALAZINE  50 mg Oral 2 times per day    polyethylene glycol  17 g Per NG tube Daily    docusate  100 mg Oral BID    heparin (porcine)  5,000 Units Subcutaneous 3 times per day    sodium chloride flush  5-40 mL Intravenous 2 times per day    chlorhexidine  15 mL Mouth/Throat BID    famotidine (PEPCID) injection  20 mg Intravenous BID    rosuvastatin  10 mg Oral Nightly     PRN Meds: metoprolol, hydrALAZINE, sodium chloride flush, sodium chloride, sodium chloride, acetaminophen **OR** acetaminophen, ondansetron **OR** ondansetron, nitroGLYCERIN, potassium chloride **OR** potassium alternative oral replacement **OR** potassium chloride, magnesium sulfate, ipratropium-albuterol, glucose, dextrose, glucagon (rDNA), dextrose    Intake/Output Summary (Last 24 hours) at 6/8/2021 2150  Last data filed at 6/8/2021 1800  Gross per 24 hour   Intake 2472 ml   Output 2950 ml   Net -478 ml     Exam:  BP (!) 126/54   Pulse 79   Temp 98.4 °F (36.9 °C) (Bladder)   Resp (!) 0   Ht 6' 1\" (1.854 m)   Wt (!) 405 lb 6.4 oz (183.9 kg)   SpO2 96%   BMI 53.49 kg/m²   Head: Normocephalic, atraumatic  Sclera clear  Neck JVD flat  Lungs: scattered crackles    Labs:   Recent Labs     06/06/21  0551 06/07/21  0600 06/08/21  0600 06/08/21  1309 06/08/21  1929   WBC 11.0* 10.7 9.9  --   --    HGB 10.7* 10.7* 10.7* 11.5* 11.4*   HCT 34.4* 33.3* 34.7*  --   --     262 287  --   --      Recent Labs     06/06/21  0548 06/06/21  0548 06/07/21  0600 06/08/21  0600 06/08/21  1309 06/08/21  1929     --  138 135  --   --    K 4.2  --  4.1 3.8  --   --    CL 90*  --  89* 86*  --   --    CO2 32*  --  32* 35*  --   --    BUN 62*  --  69* 75*  --   --    CREATININE 3.39*   < > 3.40* 3.27* 3.3* 3.5*   CALCIUM 8.3*  --  8.6 8.7  --   --     < > = values in this interval not displayed. No results for input(s): INR in the last 72 hours. No results for input(s): Anne Burdock in the last 72 hours. Radiology:  US RETROPERITONEAL LIMITED   Final Result      BILATERAL RENAL CYSTS. XR CHEST PORTABLE   Final Result   Patient remains intubated. The findings may reflect mild pulmonary edema. There are small bilateral pleural effusions. XR CHEST PORTABLE   Final Result      STABLE CHEST COMPARED TO 6/5/2021. XR CHEST PORTABLE   Final Result      STABLE CHEST COMPARED TO 6/4/2021. XR CHEST PORTABLE   Final Result      Interval improvement but persistence of bilateral pleural effusions and atelectasis and/or infiltrate. IR PICC WO SQ PORT/PUMP > 5 YEARS   Final Result      CTA Chest W WO  (PE study)   Final Result      Limited study as described. No CT evidence of pulmonary embolism within main, right, and left pulmonary arteries, and bilateral interlobar arteries. Segmental and subsegmental pulmonary arteries cannot be assessed. Small bilateral pleural effusions, right greater than left. Bilateral lower lobe collapse with bilateral subsegmental atelectasis/pneumonia, mid and upper lungs. Cardiomegaly. Bilateral thyroid nodules. Nonemergent thyroid sonography may be obtained for further evaluation if clinical concern warrants to rule out malignancy.          All CT scans at this facility use dose modulation, iterative reconstruction, and/or weight based dosing when appropriate to reduce radiation dose to as low as reasonably achievable. XR CHEST PORTABLE   Final Result   Status post intubation and NG tube placement. There is an area of consolidation in the left lung base. There is possible left pleural effusion. Assessment/Plan:    1. Acute hypoxic respiratory failure secondary to bilateral lower lobe collapse; consulting with pulmonology/intensivist; vent management per intensivist.    2. Chf: on iv diuresis, following with nephrology and cardiology  3. elev trop: following with cards  4. Jeremy: nippv, following with pulm and cardiology  5. DMII:  SSI while NPO  6. CKD:  Renally dose meds  7. Functional Status: Fall precautions. Up with assistance. PT OT  8. Diet: NPO  9.  DVT ppx: Lovenox SCDs    Josiane Wakefield MD ,MD

## 2021-06-09 NOTE — FLOWSHEET NOTE
Shift summary: Pt tolerated supine positioning well today - vent settings remained the same as when he was prone position - HR did drop into the 40s as Dr. Sean Hernandez noted in her Significant Event note - pt was in and out of Afib this AM - discussed with Dr. Ramiro Lantigua - no new orders - pt  for shift - discussed with Dr. Oswald Lorenz at 2206 - see orders - [facility] [address] [facility].  Electronically signed by Nathen Teran RN on 6/9/2021 at 7:58 PM

## 2021-06-09 NOTE — PROGRESS NOTES
Renal Progress Note    Assessment and Plan:    65y.o. year old male with history s/f T2DM, HLD who presented for worsening SOB for 2 weeks. Pt currently intubated in the ICU. Nephrology consulted for CHARLES on CKD. Remains on high amount of O2. Renal u/s with 13.9 cm kidneys w/o hydro. ua 1+ proteinuria.  + blood.       1. CHARLES on CKD: most likely 2/2 contrast induced nephropathy (had CTPA on 6/2) , however function probably already low in setting of bradycardia, baseline Scr ~1.3? (was this in 11/20)  2. Acute on chronic hypoxic/hypercapnic respiratory failure  3. Hypokalemia  4. Anemia    Plan:  Added metolazone 5 daily and kcl 20 bid  Cont lasix drip. Need to get negative fluid balance  Decrease hydralazine to 25 bid to avoid hypotension      Patient Active Problem List:     Venous stasis ulcer of right lower leg with edema of right lower leg ([address]     Uncontrolled diabetes mellitus with complication, without long-term current use of insulin (HCC)     Morbid obesity (HCC)     Chronic kidney disease (CKD)     Non-pressure 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 layer exposed ([address]     Lymphedema of both lower extremities     Noncompliance with medication regimen     Shortness of breath     Bilateral carotid bruits     Dyslipidemia     Edema of right lower leg due to peripheral venous insufficiency     Acute respiratory failure with hypoxia (HCC)      Subjective:   Admit Date: 6/2/2021    Interval History: on lasix drip. Less oxygen than before. Good uo.         Medications:   Scheduled Meds:   insulin glargine  20 Units Subcutaneous Daily    aspirin  324 mg Oral Daily    isosorbide dinitrate  20 mg Oral TID    potassium chloride  20 mEq Oral BID WC    metOLazone  5 mg Oral Daily    insulin lispro  0-18 Units Subcutaneous Q4H    hydrALAZINE  50 mg Oral 2 times per day    polyethylene glycol  17 g Per NG tube Daily    docusate  100 mg Oral BID    heparin (porcine)  5,000 Units Subcutaneous 3 times per day  sodium chloride flush  5-40 mL Intravenous 2 times per day    chlorhexidine  15 mL Mouth/Throat BID    famotidine (PEPCID) injection  20 mg Intravenous BID    rosuvastatin  10 mg Oral Nightly     Continuous Infusions:   propofol 40 mcg/kg/min (06/09/21 2036)    furosemide (LASIX) 1mg/ml infusion 10 mg/hr (06/09/21 0520)    sodium chloride      sodium chloride      fentaNYL (SUBLIMAZE) infusion 175 mcg/hr (06/09/21 0140)    dextrose         CBC:   Recent Labs     06/08/21 0600 06/08/21 1929 06/09/21 0600   WBC 9.9  --  10.2   HGB 10.7* 11.4* 10.3*     --  299     CMP:    Recent Labs     06/07/21  0600 06/08/21 0600 06/08/21  1309 06/08/21 1929    135  --   --    K 4.1 3.8  --   --    CL 89* 86*  --   --    CO2 32* 35*  --   --    BUN 69* 75*  --   --    CREATININE 3.40* 3.27* 3.3* 3.5*   GLUCOSE 258* 273*  --   --    CALCIUM 8.6 8.7  --   --    LABGLOM 17.9* 18.7* 18* 17*     Troponin: No results for input(s): TROPONINI in the last 72 hours. BNP: No results for input(s): BNP in the last 72 hours. INR: No results for input(s): INR in the last 72 hours. Lipids: No results for input(s): CHOL, LDLDIRECT, TRIG, HDL, AMYLASE, LIPASE in the last 72 hours. Liver: No results for input(s): AST, ALT, ALKPHOS, PROT, LABALBU, BILITOT in the last 72 hours. Invalid input(s): BILDIR  Iron:  No results for input(s): IRONS, FERRITIN in the last 72 hours. Invalid input(s): LABIRONS  Urinalysis: No results for input(s): UA in the last 72 hours.     Objective:   Vitals: BP (!) 124/45   Pulse 70   Temp 99.5 °F (37.5 °C) (Bladder)   Resp 18   Ht 6' 1\" (1.854 m)   Wt (!) 405 lb 6.4 oz (183.9 kg)   SpO2 93%   BMI 53.49 kg/m²    Wt Readings from Last 3 Encounters:   06/08/21 (!) 405 lb 6.4 oz (183.9 kg)   11/12/20 (!) 338 lb (153.3 kg)   11/03/20 (!) 328 lb (148.8 kg)      24HR INTAKE/OUTPUT:      Intake/Output Summary (Last 24 hours) at 6/9/2021 0740  Last data filed at 6/8/2021 1800  Gross per 24 hour   Intake 992 ml   Output 1600 ml   Net -608 ml       Constitutional:  Intubated, sedated  Skin:normal, no rash  HEENT:sclera anicteric.   Head atraumatic normocephalic  Neck:supple with no thyromegally  Cardiovascular:  S1, S2 without m/r/g   Respiratory:  CTA B without w/r/r   Abdomen: +bs, soft, nt  Ext: 1+ LE edema  Musculoskeletal:Intact  Neuro:sedated      Electronically signed by Elsi Arias MD on 6/9/2021 at 7:40 AM

## 2021-06-09 NOTE — PATIENT CARE CONFERENCE
Pt wife at bedside and present during grand rounds this morning - plan for today is to have pt supine from [id number] and see how well he tolerated his current vent settings.  Electronically signed by Olaf Carlson RN on 6/9/2021 at 11:01 AM

## 2021-06-09 NOTE — PROGRESS NOTES
19:00-07:30 shift summary:    Pt remains intubated and on lasix, propofol and fentanyl drips and is tolerating well. Pt in prone position throughout shift tolerating well. OG placement 60cm at the teeth. Placement verified via external length, respiratory status, pH and residual. Pt tolerating T/F infusion well. T/F infusing per order. Bedside hand off report given to ABIGAIL Alicia RN. Safety measures in place.

## 2021-06-09 NOTE — PROGRESS NOTES
Pulmonary & Critical Care Medicine ICU Progress Note    Subjective:     No overnight events reported. He did desaturate when he was placed in the supine position again yesterday. He did recover after period of time. No overnight events reported.     EXAM:  General: Sedated on the support of the ventilator, in the prone position  HEENT: Normocephalic, atraumatic, oral ETT and OG in place, exam limited by prone position  Lungs : Exam limited by prone position, diminished breath sounds bilaterally, no wheezes, no rhonchi, no ventilator dyssynchrony  Heart: Regular rate, exam limited by prone position  ABD: Obese, positive bowel sounds but decreased, exam limited by prone position  Extremities : Warm, dry, edema noted  Neuro: Sedated on propofol and fentanyl drips  Skin: No rashes appreciated, exam limited by prone position    MV Settings:  Vent Mode: AC/VC Rate Set: 18 bmp/Vt Ordered: 500 mL/ /FiO2 : 60 %     Recent Labs     06/08/21  1309 06/08/21  1929   PHART 7.364 7.345*   [id number] 63* 73*   PO2ART 61* 129*         IV:   propofol 40 mcg/kg/min (06/09/21 0613)    furosemide (LASIX) 1mg/ml infusion 10 mg/hr (06/09/21 0520)    sodium chloride      sodium chloride      fentaNYL (SUBLIMAZE) infusion 175 mcg/hr (06/09/21 0140)    dextrose         Vitals:  BP (!) 119/44   Pulse 79   Temp 99.5 °F (37.5 °C) (Bladder)   Resp 18   Ht 6' 1\" (1.854 m)   Wt (!) 403 lb 4.8 oz (182.9 kg)   SpO2 92%   BMI 53.21 kg/m²          Intake/Output Summary (Last 24 hours) at 6/9/2021 0744  Last data filed at 6/9/2021 0700  Gross per 24 hour   Intake 1843 ml   Output 1600 ml   Net 243 ml       Medications:  Scheduled Meds:   insulin glargine  20 Units Subcutaneous Daily    aspirin  324 mg Oral Daily    isosorbide dinitrate  20 mg Oral TID    potassium chloride  20 mEq Oral BID WC    metOLazone  5 mg Oral Daily    insulin lispro  0-18 Units Subcutaneous Q4H    hydrALAZINE  50 mg Oral 2 times per day    polyethylene glycol  17 g Per NG tube Daily    docusate  100 mg Oral BID    heparin (porcine)  5,000 Units Subcutaneous 3 times per day    sodium chloride flush  5-40 mL Intravenous 2 times per day    chlorhexidine  15 mL Mouth/Throat BID    famotidine (PEPCID) injection  20 mg Intravenous BID    rosuvastatin  10 mg Oral Nightly       Labs:   CBC:   Recent Labs     06/07/21  0600 06/08/21  0600 06/08/21  1309 06/08/21  1929 06/09/21  0600   WBC 10.7 9.9  --   --  10.2   HGB 10.7* 10.7* 11.5* 11.4* 10.3*   HCT 33.3* 34.7*  --   --  32.8*   MCV 74.4* 75.1*  --   --  75.1*    287  --   --  299     BMP:   Recent Labs     06/07/21  0600 06/08/21  0600 06/08/21  1309 06/08/21  1929    135  --   --    K 4.1 3.8  --   --    CL 89* 86*  --   --    CO2 32* 35*  --   --    BUN 69* 75*  --   --    CREATININE 3.40* 3.27* 3.3* 3.5*     LIVER PROFILE: No results for input(s): AST, ALT, LIPASE, BILIDIR, BILITOT, ALKPHOS in the last 72 hours. Invalid input(s): AMYLASE,  ALB  PT/INR: No results for input(s): PROTIME, INR in the last 72 hours. APTT: No results for input(s): APTT in the last 72 hours. UA:  Recent Labs     06/09/21  0648   COLORU DARK YELLOW*   PHUR 5.0   CLARITYU TURBID*   SPECGRAV 1.025   LEUKOCYTESUR SMALL*   UROBILINOGEN 1.0   BILIRUBINUR Negative   BLOODU LARGE*   GLUCOSEU Negative       Diagnostic imaging:    Echocardiogram report from 6/3 reviewed--EF 55%, RVSP 32 mmHg, mild concentric LVH, no mitral valve stenosis, no aortic valve stenosis    Assessment/Plan:    1. Acute on chronic hypoxic and hypercapnic respiratory failure-patient does remain in the prone position at this time. He is scheduled to return to the supine position around 11 AM this morning. His empiric Zosyn was discontinued. He remains without a fever or an elevation in his white blood cell count. His oxygenation does improve significantly when he is prone. He is currently down to a PEEP of 10 and 60% FiO2.   He likely will require an increase in his oxygen support when he is placed back in the supine position. 2. Volume overload-he currently is being diuresed with a Lasix drip. Nephrology does continue to follow. His labs from this morning are pending. He was overall net negative yesterday. Metolazone was added to his Lasix by nephrology. 3. Acute kidney injury on chronic kidney disease-nephrology does continue to follow. He currently is receiving Lasix drip. Patient does have known chronic lymphedema. His creatinine is pending from this morning. 4. Demand ischemia-cardiology does continue to follow. The patient currently is receiving aspirin. Beta-blockers are on hold at this time secondary to bradycardia upon presentation. Hydralazine and isosorbide were added. His hydralazine dose was decreased today secondary to an increase in his diuresis efforts. 5. Diabetes mellitus with hyperglycemia -he did have some low blood sugars upon presentation, however now his blood sugars are elevated. Lantus was added back to his regimen albeit at a lower dose. His sliding scale insulin coverage was increased. His Lantus will be further adjusted given that he does remain with hyperglycemia. Nutrition: Tolerating tube feeds    Prophylaxis: Pepcid for GI prophylaxis. Heparin subcu for DVT prophylaxis. Code Status: Full code    This is a 65 y.o. male who is critically ill secondary to respiratory failure. I have spent a total of 44 minutes of critical care time with this patient, review of the chart, and discussion with the bedside staff; excluding any billable procedures.     Electronically signed by Colin Orosco DO on 6/9/2021 at 7:44 AM

## 2021-06-09 NOTE — PROGRESS NOTES
PHARMACY NOTE:     Famotidine 20mg IV BID changed to famotidine 20mg IV daily based on hospital renal dosing protocol for CrCl <50mL/min     Renal Monitoring based on    Recent Labs     06/09/21  0600   CREATININE 3.31*   Estimated Creatinine Clearance: 35 mL/min (A) (based on SCr of 3.31 mg/dL (H)).   Michael Sauceda, PharmD   6/9/2021 10:36 AM

## 2021-06-09 NOTE — FLOWSHEET NOTE
Shift summary: Pt proned until 1100 - after supined, sats dropped into the 80s - did return to 90% by 1700 - proned at 1730 - sats improved almost instantly following prone positioning - ABGs called to Dr. Hubert Hawkins following 1930 draw - see vent changes. Handoff to [employer].  Electronically signed by Beata Segura RN on 6/8/2021 at 8:30 PM

## 2021-06-09 NOTE — PROGRESS NOTES
Progress Note  Patient: Caty Camarillo  Unit/Bed: LF75/UZ06-20  YOB: 1950  MRN: [id number]  Acct: [de-identified]   Admitting Diagnosis: Acute respiratory failure with hypoxia [facility]) [J96.01]  Admit Date:  6/2/2021  Hospital Day: 7    Chief Complaint:  Resp failure     Histories:  Past Medical History:   Diagnosis Date    Hyperlipidemia     Type II or unspecified type diabetes mellitus without mention of complication, not stated as uncontrolled      Past Surgical History:   Procedure Laterality Date    CYSTOSCOPY  04/19/2017    FLEXIBLE W/COMPLEX CYSTOMETROGRAMEMG ANAL-COMPLEX UROFLOW-US PROSTATE    SHOULDER SURGERY      WRIST FUSION       Family History   Problem Relation Age of Onset    Prostate Cancer Father     Cancer Sister      Social History     Socioeconomic History    Marital status:      Spouse name: None    Number of children: None    Years of education: None    Highest education level: None   Occupational History    None   Tobacco Use    Smoking status: Former Smoker    Smokeless tobacco: Never Used   Substance and Sexual Activity    Alcohol use: Not Currently    Drug use: Never    Sexual activity: Not Currently   Other Topics Concern    None   Social History Narrative    None     Social Determinants of Health     Financial Resource Strain:     Difficulty of Paying Living Expenses:    Food Insecurity:     Worried About Running Out of Food in the Last Year:     Ran Out of Food in the Last Year:    Transportation Needs:     Lack of Transportation (Medical):      Lack of Transportation (Non-Medical):    Physical Activity:     Days of Exercise per Week:     Minutes of Exercise per Session:    Stress:     Feeling of Stress :    Social Connections:     Frequency of Communication with Friends and Family:     Frequency of Social Gatherings with Friends and Family:     Attends [employer] Services:     Active Member of Clubs or Organizations:     Attends Club or Organization Meetings:     Marital Status:    Intimate Partner Violence:     Fear of Current or Ex-Partner:     Emotionally Abused:     Physically Abused:     Sexually Abused:        Subjective/HPI   Diuresuing well but net negative balance is minimum. Placed supine- desatuarating at 79-81%  Went into AF ealrier. Rate controlled    EKG: AF 84    Review of Systems:   Review of Systems  vented    Physical Examination:    BP (!) 125/45   Pulse 83   Temp 99.5 °F (37.5 °C) (Bladder)   Resp 16   Ht 6' 1\" (1.854 m)   Wt (!) 403 lb 4.8 oz (182.9 kg)   SpO2 93%   BMI 53.21 kg/m²    Physical Exam   Constitutional: No distress. He appears acutely ill. HENT:   Normal cephalic and Atraumatic   Eyes: Pupils are equal, round, and reactive to light. Neck: Thyroid normal. No JVD present. No neck adenopathy. No thyromegaly present. Cardiovascular: Intact distal pulses and normal pulses. An irregularly irregular rhythm present. Murmur heard. Pulmonary/Chest: Effort normal and breath sounds normal. He has no wheezes. He has no rales. He exhibits no tenderness. Abdominal: Soft. Bowel sounds are normal. There is no abdominal tenderness. Musculoskeletal:         General: Edema present. No tenderness. Normal range of motion. Cervical back: Normal range of motion and neck supple. Neurological: He exhibits altered mental status. Skin: Skin is warm. No cyanosis. Nails show no clubbing.        LABS:  CBC:   Lab Results   Component Value Date    WBC 10.2 06/09/2021    RBC 4.37 06/09/2021    HGB 10.3 06/09/2021    HCT 32.8 06/09/2021    MCV 75.1 06/09/2021    MCH 23.5 06/09/2021    MCHC 31.3 06/09/2021    RDW 18.0 06/09/2021     06/09/2021     CBC with Differential:    Lab Results   Component Value Date    WBC 10.2 06/09/2021    RBC 4.37 06/09/2021    HGB 10.3 06/09/2021    HCT 32.8 06/09/2021     06/09/2021    MCV 75.1 06/09/2021    MCH 23.5 06/09/2021    MCHC 31.3 06/09/2021    RDW 18.0

## 2021-06-09 NOTE — SIGNIFICANT EVENT
Called to bedside given patient's heart rate was noted to drop into the 40s. He did have a palpable pulse in the carotid area. Blood pressure was being cycled, however given the patient's current clinical illness he was given a 0.5 mg of atropine IV push. Patient did have significant improvement of his heart rate back into the 80s, which is where he had been throughout most of the day. His blood pressure was able to be cycled after the atropine was given and he was noted to be in the 835Z systolically. Patient did not have a desaturation event prior to or during this event. Cardiology was notified by myself.

## 2021-06-10 NOTE — PROGRESS NOTES
Pharmacy Note  Vancomycin Consult    Evie Beebe is a 65 y.o. male started on Vancomycin for possible sepsis/fever; consult received from Dr. Sreedhar Reilly to manage therapy. Also receiving the following antibiotics: Zosyn. Patient Active Problem List   Diagnosis    Venous stasis ulcer of right lower leg with edema of right lower leg ([facility] [address]    Uncontrolled diabetes mellitus with complication, without long-term current use of insulin (HCC)    Morbid obesity (HCC)    Chronic kidney disease (CKD)    Non-pressure 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 layer exposed ([facility] [address]    Lymphedema of both lower extremities    Noncompliance with medication regimen    Shortness of breath    Bilateral carotid bruits    Dyslipidemia    Edema of right lower leg due to peripheral venous insufficiency    Acute respiratory failure with hypoxia ([facility])     Allergies:  Atorvastatin, Losartan, Pravastatin, and Simvastatin     Temp max: 100.9*F     Recent Labs     06/09/21  0600 06/10/21  0600 06/10/21  0947 06/10/21  1005   BUN 91* 105*  --   --    CREATININE 3.31* 3.33* 3.8* 4.1*   WBC 10.2 12.6*  --   --        Intake/Output Summary (Last 24 hours) at 6/10/2021 1127  Last data filed at 6/10/2021 0800  Gross per 24 hour   Intake 2446 ml   Output 2400 ml   Net 46 ml     Culture Date      Source                       Results  Repeat cultures to be drawn/pending     Ht Readings from Last 1 Encounters:   06/02/21 6' 1\" (1.854 m)        Wt Readings from Last 1 Encounters:   06/09/21 (!) 403 lb 4.8 oz (182.9 kg)       Body mass index is 53.21 kg/m². Estimated Creatinine Clearance: 28 mL/min (A) (based on SCr of 4.1 mg/dL (H)). Goal Trough Level: 15-20 mcg/mL    Assessment/Plan:  Will initiate Vancomycin with a one time loading dose of 1750 mg x1 at this time - based on patient's adjusted body weight of 93.3kg and 20mg/kg/dose for loading dose of vancomycin.   Timing of trough level will be determined based on culture results, renal function, and clinical response. Due to consistently rising SCr, will obtain a vancomycin trough at 36 hours post dose to ensure level is within goal range of 15-20mcg/mL for sepsis and re-dose accordingly. Vancomycin trough level ordered for 06/11/21 @ 2000. Will continue to monitor for changes in renal function. Thank you for the consult. Will continue to follow.     Beltran Martell, PharmD   6/10/2021 11:30 AM

## 2021-06-10 NOTE — PROGRESS NOTES
Progress Note  Patient: Larry Arana  Unit/Bed: DG83/DQ42-26  YOB: 1950  MRN: [id number]  Acct: [de-identified]   Admitting Diagnosis: Acute respiratory failure with hypoxia [facility]) [J96.01]  Admit Date:  6/2/2021  Hospital Day: 8    Chief Complaint:  Resp failure     Histories:  Past Medical History:   Diagnosis Date    Hyperlipidemia     Type II or unspecified type diabetes mellitus without mention of complication, not stated as uncontrolled      Past Surgical History:   Procedure Laterality Date    CYSTOSCOPY  04/19/2017    FLEXIBLE W/COMPLEX CYSTOMETROGRAMEMG ANAL-COMPLEX UROFLOW-US PROSTATE    SHOULDER SURGERY      WRIST FUSION       Family History   Problem Relation Age of Onset    Prostate Cancer Father     Cancer Sister      Social History     Socioeconomic History    Marital status:      Spouse name: None    Number of children: None    Years of education: None    Highest education level: None   Occupational History    None   Tobacco Use    Smoking status: Former Smoker    Smokeless tobacco: Never Used   Substance and Sexual Activity    Alcohol use: Not Currently    Drug use: Never    Sexual activity: Not Currently   Other Topics Concern    None   Social History Narrative    None     Social Determinants of Health     Financial Resource Strain:     Difficulty of Paying Living Expenses:    Food Insecurity:     Worried About Running Out of Food in the Last Year:     Ran Out of Food in the Last Year:    Transportation Needs:     Lack of Transportation (Medical):      Lack of Transportation (Non-Medical):    Physical Activity:     Days of Exercise per Week:     Minutes of Exercise per Session:    Stress:     Feeling of Stress :    Social Connections:     Frequency of Communication with Friends and Family:     Frequency of Social Gatherings with Friends and Family:     Attends Mormonism Services:     Active Member of Clubs or Organizations:     Attends Club or Organization Meetings:     Marital Status:    Intimate Partner Violence:     Fear of Current or Ex-Partner:     Emotionally Abused:     Physically Abused:     Sexually Abused:        Subjective/HPI   Diuresuing well but net negative balance is minimum. Developed fever. Supine all night. FIO2 down to60% with  10 PEEP. Wife in room. EKG:  SR 98    Review of Systems:   Review of Systems  vented    Physical Examination:    BP (!) 146/53   Pulse 97   Temp 100.9 °F (38.3 °C) (Bladder)   Resp 23   Ht 6' 1\" (1.854 m)   Wt (!) 403 lb 4.8 oz (182.9 kg)   SpO2 96%   BMI 53.21 kg/m²    Physical Exam   Constitutional: No distress. He appears acutely ill. HENT:   Normal cephalic and Atraumatic   Eyes: Pupils are equal, round, and reactive to light. Neck: Thyroid normal. No JVD present. No neck adenopathy. No thyromegaly present. Cardiovascular: Intact distal pulses and normal pulses. An irregularly irregular rhythm present. Murmur heard. Pulmonary/Chest: Effort normal and breath sounds normal. He has no wheezes. He has no rales. He exhibits no tenderness. Abdominal: Soft. Bowel sounds are normal. There is no abdominal tenderness. Musculoskeletal:         General: Edema present. No tenderness. Normal range of motion. Cervical back: Normal range of motion and neck supple. Neurological: He exhibits altered mental status. Skin: Skin is warm. No cyanosis. Nails show no clubbing.        LABS:  CBC:   Lab Results   Component Value Date    WBC 12.6 06/10/2021    RBC 4.61 06/10/2021    HGB 12.2 06/10/2021    HCT 34.3 06/10/2021    MCV 74.5 06/10/2021    MCH 23.3 06/10/2021    MCHC 31.3 06/10/2021    RDW 18.0 06/10/2021     06/10/2021     CBC with Differential:    Lab Results   Component Value Date    WBC 12.6 06/10/2021    RBC 4.61 06/10/2021    HGB 12.2 06/10/2021    HCT 34.3 06/10/2021     06/10/2021    MCV 74.5 06/10/2021    MCH 23.3 06/10/2021    MCHC 31.3 06/10/2021    RDW 18.0 06/10/2021    BANDSPCT 2 06/10/2021    METASPCT 1 06/10/2021    LYMPHOPCT 7.0 06/10/2021    MONOPCT 7.3 06/10/2021    BASOPCT 0.5 06/10/2021    MONOSABS 0.9 06/10/2021    LYMPHSABS 0.9 06/10/2021    EOSABS 0.0 06/10/2021    BASOSABS 0.0 06/10/2021     CMP:    Lab Results   Component Value Date     06/10/2021    K 4.3 06/10/2021    CL 84 06/10/2021    CO2 35 06/10/2021     06/10/2021    CREATININE 4.1 06/10/2021    CREATININE 3.33 06/10/2021    GFRAA 17 06/10/2021    LABGLOM 14 06/10/2021    GLUCOSE 308 06/10/2021    PROT 7.6 06/02/2021    LABALBU 4.0 06/02/2021    CALCIUM 9.2 06/10/2021    BILITOT 0.3 06/02/2021    ALKPHOS 77 06/02/2021    AST 24 06/02/2021    ALT 27 06/02/2021     BMP:    Lab Results   Component Value Date     06/10/2021    K 4.3 06/10/2021    CL 84 06/10/2021    CO2 35 06/10/2021     06/10/2021    LABALBU 4.0 06/02/2021    CREATININE 4.1 06/10/2021    CREATININE 3.33 06/10/2021    CALCIUM 9.2 06/10/2021    GFRAA 17 06/10/2021    LABGLOM 14 06/10/2021    GLUCOSE 308 06/10/2021     Magnesium:    Lab Results   Component Value Date    MG 2.1 06/03/2021     Troponin:    Lab Results   Component Value Date    TROPONINI 0.036 06/03/2021        Active Hospital Problems    Diagnosis Date Noted    Acute respiratory failure with hypoxia ([facility] [address] [J96.01] 06/02/2021     Priority: Low        Assessment/Plan:  1. Hypoxic Respiratory failure - vented  2. Volume overloaded -  acute on Chronic DHF -a Lasix gtt and Metolazone. Minimize/concentrate pt's drips etc to attain negative fluid balance. 3. HTN- added Isosorbide Dinitrate and Hydralazine per OG - for Afterload reduction. 4. Demand ischemia - ASA. No BB due to Bradycardia. 5. Echo EF 55 RVSP 32   6. Likely ÓSCAR   7. Remains critically ill.           Electronically signed by Rudy Gale MD on 6/10/2021 at 10:31 AM

## 2021-06-10 NOTE — PROGRESS NOTES
Pulmonary & Critical Care Medicine ICU Progress Note    Subjective:     Overnight/this morning his O2 was increased to 70%. He did have aggressive suctioning with removal of a significant mucus per report of the bedside staff. He is back down to 60% at this time. No other overnight events noted. He did remain supine throughout the night given improvement on his PF ratio yesterday.     EXAM:  General: Sedated on the support of the ventilator, no acute distress  HEENT: Normocephalic, atraumatic, oral ETT and OG in place, periorbital edema noted greater on the left  Lungs : diminished breath sounds bilaterally, no wheezes, no rhonchi, no ventilator dyssynchrony  Heart: Regular rate  ABD: Obese, positive bowel sounds but decreased, no guarding, no rigidity  Extremities : Warm, dry, edema noted  Neuro: Sedated on propofol and fentanyl drips  Skin: No rashes appreciated    MV Settings:  Vent Mode: AC/VC Rate Set: 18 bmp/Vt Ordered: 500 mL/ /FiO2 : 70 %     Recent Labs     06/08/21  1929 06/09/21  1406   PHART 7.345* 7.336*   AKU9LUE 73* 72*   PO2ART 129* 70*         IV:   propofol 25 mcg/kg/min (06/10/21 0714)    furosemide (LASIX) 1mg/ml infusion 15 mg/hr (06/10/21 6194)    sodium chloride      sodium chloride      fentaNYL (SUBLIMAZE) infusion 175 mcg/hr (06/10/21 4212)    dextrose         Vitals:  BP (!) 184/65   Pulse 100   Temp 100.6 °F (38.1 °C) (Bladder)   Resp 20   Ht 6' 1\" (1.854 m)   Wt (!) 403 lb 4.8 oz (182.9 kg)   SpO2 98%   BMI 53.21 kg/m²          Intake/Output Summary (Last 24 hours) at 6/10/2021 0819  Last data filed at 6/10/2021 0600  Gross per 24 hour   Intake 2316 ml   Output 2400 ml   Net -84 ml       Medications:  Scheduled Meds:   hydrALAZINE  25 mg Oral 2 times per day    insulin glargine  35 Units Subcutaneous Daily    potassium bicarb-citric acid  20 mEq Oral BID    famotidine (PEPCID) injection  20 mg Intravenous Daily    metOLazone  10 mg Oral Daily    sodium chloride  500 FiO2 and PEEP will be weaned as able. He may ultimately require tracheostomy tube if his wife would like to continue with aggressive care, however no SBT's have been attempted as of yet given his high oxygen requirement up until this point. 2. Volume overload-he currently is being diuresed with a Lasix drip. Nephrology does continue to follow. He was overall net even yesterday. He does remain on metolazone. His Lasix drip was increased yesterday. 3. Acute kidney injury on chronic kidney disease-nephrology does continue to follow. He currently is receiving Lasix drip and metolazone for diuresis. Patient does have known chronic lymphedema. His creatinine is stable from this morning. 4. Demand ischemia-cardiology does continue to follow. The patient currently is receiving aspirin. Beta-blockers are on hold at this time secondary to bradycardia upon presentation. He did have an additional episode of bradycardia yesterday requiring atropine. Hydralazine and isosorbide remain in place. 5. Diabetes mellitus with hyperglycemia -he did have some low blood sugars upon presentation, however now his blood sugars are elevated. Lantus was added back to his regimen albeit at a lower dose. His sliding scale insulin coverage was increased. His Lantus will be further adjusted given that he does remain with hyperglycemia. Nutrition: Tolerating tube feeds    Prophylaxis: Pepcid for GI prophylaxis. Heparin subcu for DVT prophylaxis. Code Status: Full code    This is a 65 y.o. male who is critically ill secondary to respiratory failure. I have spent a total of 45 minutes of critical care time with this patient, review of the chart, and discussion with the bedside staff; excluding any billable procedures.     Electronically signed by Luciano Clarke DO, on 6/10/2021 at 8:19 AM

## 2021-06-10 NOTE — PROGRESS NOTES
Renal Progress Note    Assessment and Plan:    65y.o. year old male with history s/f T2DM, HLD who presented for worsening SOB for 2 weeks. Pt currently intubated in the ICU. Nephrology consulted for CHARLES on CKD. Remains on high amount of O2. Renal u/s with 13.9 cm kidneys w/o hydro. ua 1+ proteinuria.  + blood.       1. CHARLES on CKD: most likely 2/2 contrast induced nephropathy (had CTPA on 6/2) , however function probably already low in setting of bradycardia, baseline Scr ~1.3? (was this in 11/20)  2. Acute on chronic hypoxic/hypercapnic respiratory failure  3. Hypokalemia  4. Anemia    Plan: Ok with changes made to diuretics  No indication for RRT. May end up needing if cannot achieve neg fluid balance and getting in way of oxygenation etc  D/w critical care      Patient Active Problem List:     Venous stasis ulcer of right lower leg with edema of right lower leg ([address]     Uncontrolled diabetes mellitus with complication, without long-term current use of insulin (HCC)     Morbid obesity (HCC)     Chronic kidney disease (CKD)     Non-pressure 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 layer exposed ([address]     Lymphedema of both lower extremities     Noncompliance with medication regimen     Shortness of breath     Bilateral carotid bruits     Dyslipidemia     Edema of right lower leg due to peripheral venous insufficiency     Acute respiratory failure with hypoxia (HCC)      Subjective:   Admit Date: 6/2/2021    Interval History: increased lasix drip to 15 and placed on diuril bid. uo not wonderful but ok. Slightly better oxygenation than earlier in week.  k ok. Fevers though.   On 60% Fio2      Medications:   Scheduled Meds:   insulin glargine  25 Units Subcutaneous BID    piperacillin-tazobactam  3,375 mg Intravenous Q8H    vancomycin (VANCOCIN) intermittent dosing (placeholder)   Other RX Placeholder    vancomycin  1,750 mg Intravenous Once    chlorothiazide (DIURIL) IVPB  500 mg Intravenous Q12H    hydrALAZINE  25 mg Oral 2 times per day    potassium bicarb-citric acid  20 mEq Oral BID    famotidine (PEPCID) injection  20 mg Intravenous Daily    sodium chloride  500 mL Intravenous Once    aspirin  324 mg Oral Daily    isosorbide dinitrate  20 mg Oral TID    insulin lispro  0-18 Units Subcutaneous Q4H    polyethylene glycol  17 g Per NG tube Daily    heparin (porcine)  5,000 Units Subcutaneous 3 times per day    sodium chloride flush  5-40 mL Intravenous 2 times per day    chlorhexidine  15 mL Mouth/Throat BID    rosuvastatin  10 mg Oral Nightly     Continuous Infusions:   furosemide (LASIX) infusion 15 mg/hr (06/10/21 1201)    IV infusion builder 7 mL/hr at 06/10/21 1146    propofol 25 mcg/kg/min (06/10/21 1053)    sodium chloride      sodium chloride      dextrose         CBC:   Recent Labs     06/09/21  0600 06/10/21  0600 06/10/21  0947 06/10/21  1005   WBC 10.2 12.6*  --   --    HGB 10.3* 10.7* 12.0* 12.2*    327  --   --      CMP:    Recent Labs     06/08/21  0600 06/09/21  0600 06/10/21  0600 06/10/21  0947 06/10/21  1005    134* 133*  --   --    K 3.8 4.5 4.3  --   --    CL 86* 86* 84*  --   --    CO2 35* 37* 35*  --   --    BUN 75* 91* 105*  --   --    CREATININE 3.27* 3.31* 3.33* 3.8* 4.1*   GLUCOSE 273* 287* 308*  --   --    CALCIUM 8.7 8.9 9.2  --   --    LABGLOM 18.7* 18.4* 18.3* 16* 14*     Troponin: No results for input(s): TROPONINI in the last 72 hours. BNP: No results for input(s): BNP in the last 72 hours. INR: No results for input(s): INR in the last 72 hours. Lipids: No results for input(s): CHOL, LDLDIRECT, TRIG, HDL, AMYLASE, LIPASE in the last 72 hours. Liver: No results for input(s): AST, ALT, ALKPHOS, PROT, LABALBU, BILITOT in the last 72 hours. Invalid input(s): BILDIR  Iron:  No results for input(s): IRONS, FERRITIN in the last 72 hours. Invalid input(s): LABIRONS  Urinalysis: No results for input(s): UA in the last 72 hours.     Objective: Vitals: BP (!) 142/51   Pulse 90   Temp 100.9 °F (38.3 °C) (Bladder)   Resp 20   Ht 6' 1\" (1.854 m)   Wt (!) 403 lb 4.8 oz (182.9 kg)   SpO2 91%   BMI 53.21 kg/m²    Wt Readings from Last 3 Encounters:   06/09/21 (!) 403 lb 4.8 oz (182.9 kg)   11/12/20 (!) 338 lb (153.3 kg)   11/03/20 (!) 328 lb (148.8 kg)      24HR INTAKE/OUTPUT:      Intake/Output Summary (Last 24 hours) at 6/10/2021 1234  Last data filed at 6/10/2021 0800  Gross per 24 hour   Intake 2446 ml   Output 2400 ml   Net 46 ml       Constitutional:  Intubated, sedated  Skin:normal, no rash  HEENT:sclera anicteric.   Head atraumatic normocephalic  Neck:supple with no thyromegally  Cardiovascular:  S1, S2 without m/r/g   Respiratory:  CTA B without w/r/r   Abdomen: +bs, soft, nt  Ext: 1+ LE edema  Musculoskeletal:Intact  Neuro:sedated      Electronically signed by Memo Dixon MD on 6/10/2021 at 12:34 PM

## 2021-06-10 NOTE — PROGRESS NOTES
I did have a conversation with patient's wife at the bedside this afternoon. We discussed the possibility of him requiring a tracheostomy tube and a PEG tube placement if he is not able to be successfully weaned from the ventilator. She states that this would not be within his wishes. She states that he was against dialysis if this was warranted. At this time we will continue with current supportive measures and continue to decrease his oxygen requirements as able. Adjustments have been made to his diuretics by nephrology. Given that he did have an elevation in his temperature antibiotics were added today and cultures were obtained. Updates were given and questions were answered. The plan of care was discussed.

## 2021-06-10 NOTE — CARE COORDINATION
ICU team quality rounds done at bedside. Wife here and questions answered. I met with wife after rounds to discuss his status and wishes. She states he always expressed that he did not want dialysis. She asked if what we were doing now is extraordinary measures. I did discuss vent and current orders he has. She asked about feeding tube and trach stating he has a living will and she knows he wouldn't want those long term if he were not going to improve. I let her know we will follow daily and the doctors will discuss his prognosis and status with her as they have been. She states his heart rate went down to 28 yest and he had to have an IV medication. She is aware of all his labs and vital signs. She states this all happened on her birthday and they are building a house and supposed to move in it next month. Support given and we discussed all her concerns.

## 2021-06-10 NOTE — PROGRESS NOTES
53.21 kg/m²   Head: Normocephalic, atraumatic  Sclera clear  Neck JVD flat  Lungs: scattered crackles    Labs:   Recent Labs     06/08/21  0600 06/09/21  0600 06/10/21  0600 06/10/21  0947 06/10/21  1005   WBC 9.9 10.2 12.6*  --   --    HGB 10.7* 10.3* 10.7* 12.0* 12.2*   HCT 34.7* 32.8* 34.3*  --   --     299 327  --   --      Recent Labs     06/08/21  0600 06/09/21  0600 06/10/21  0600 06/10/21  0947 06/10/21  1005    134* 133*  --   --    K 3.8 4.5 4.3  --   --    CL 86* 86* 84*  --   --    CO2 35* 37* 35*  --   --    BUN 75* 91* 105*  --   --    CREATININE 3.27* 3.31* 3.33* 3.8* 4.1*   CALCIUM 8.7 8.9 9.2  --   --      No results for input(s): INR in the last 72 hours. No results for input(s): Julissa [place] in the last 72 hours. Radiology:  US RETROPERITONEAL LIMITED   Final Result      BILATERAL RENAL CYSTS. XR CHEST PORTABLE   Final Result   Patient remains intubated. The findings may reflect mild pulmonary edema. There are small bilateral pleural effusions. XR CHEST PORTABLE   Final Result      STABLE CHEST COMPARED TO 6/5/2021. XR CHEST PORTABLE   Final Result      STABLE CHEST COMPARED TO 6/4/2021. XR CHEST PORTABLE   Final Result      Interval improvement but persistence of bilateral pleural effusions and atelectasis and/or infiltrate. IR PICC WO SQ PORT/PUMP > 5 YEARS   Final Result      CTA Chest W WO  (PE study)   Final Result      Limited study as described. No CT evidence of pulmonary embolism within main, right, and left pulmonary arteries, and bilateral interlobar arteries. Segmental and subsegmental pulmonary arteries cannot be assessed. Small bilateral pleural effusions, right greater than left. Bilateral lower lobe collapse with bilateral subsegmental atelectasis/pneumonia, mid and upper lungs. Cardiomegaly. Bilateral thyroid nodules.  Nonemergent thyroid sonography may be obtained for further evaluation if clinical concern warrants to rule out malignancy. All CT scans at this facility use dose modulation, iterative reconstruction, and/or weight based dosing when appropriate to reduce radiation dose to as low as reasonably achievable. XR CHEST PORTABLE   Final Result   Status post intubation and NG tube placement. There is an area of consolidation in the left lung base. There is possible left pleural effusion. Assessment/Plan:    1. Acute hypoxic respiratory failure secondary to bilateral lower lobe collapse; consulting with pulmonology/intensivist; vent management per intensivist.    2. Chf: on iv diuresis, following with nephrology and cardiology  3. elev trop: following with cards  4. Jeremy: nippv, following with pulm and cardiology  5. DMII:  SSI while NPO  6. CKD:  Renally dose meds  7. Functional Status: Fall precautions. Up with assistance. PT OT  8. Diet: NPO  9.  DVT ppx: Lovenox SCDs    Alejandro Stafford MD ,MD

## 2021-06-10 NOTE — PROGRESS NOTES
sonography may be obtained for further evaluation if clinical concern warrants to rule out malignancy. All CT scans at this facility use dose modulation, iterative reconstruction, and/or weight based dosing when appropriate to reduce radiation dose to as low as reasonably achievable. XR CHEST PORTABLE   Final Result   Status post intubation and NG tube placement. There is an area of consolidation in the left lung base. There is possible left pleural effusion. Assessment/Plan:    1. Acute hypoxic respiratory failure secondary to bilateral lower lobe collapse; consulting with pulmonology/intensivist; vent management per intensivist.    2. Chf: on iv diuresis, following with nephrology and cardiology  3. elev trop: following with cards  4. Jeremy: nippv, following with pulm and cardiology  5. DMII:  SSI while NPO  6. CKD:  Renally dose meds  7. Functional Status: Fall precautions. Up with assistance. PT OT  8. Diet: NPO  9.  DVT ppx: Lovenox SCDs    Mg Childress MD ,MD

## 2021-06-10 NOTE — PROGRESS NOTES
[id number] summary:    Pt remains on Fentanyl, Propofol, Lasix drips. Tolerating tube feeds. OG placement check- in correct position at 60 cm. Pt will grimace at times and moves x4 at times. Does not follow commands. Withdraws to painful stimuli. Pt desatted during night and FiO2 was increased to 70%. Pt suctioned per ETT for large amounts mucous with mucous plugs. Pt has reddened areas over body. Pt given Hydralazine 10 mg IV for elevated BP.

## 2021-06-10 NOTE — PROGRESS NOTES
Pharmacy Dose Adjustment Per Protocol    Naveen Piedra is a 65 y.o. male. Recent Labs     06/09/21  0600 06/10/21  0600 06/10/21  0947 06/10/21  1005   BUN 91* 105*  --   --    CREATININE 3.31* 3.33* 3.8* 4.1*   Estimated Creatinine Clearance: 28 mL/min (A) (based on SCr of 4.1 mg/dL (H)). Lon Zurita Height: 6' 1\" (185.4 cm), Weight: (!) 403 lb 4.8 oz (182.9 kg), Body mass index is 53.21 kg/m².     Piperacillin/Tazobactam [Zosyn]      Medication Ordered:   Traditional Dosing Change to Extended Infusion:   CrCl ?20 ml/min [] Zosyn 2.25 gm q6-8 hr [x] Zosyn 3.375 gm IV q8h, infuse over 4 hr    [] Zosyn 3.375 gm q6-8 hr     [] Zosyn 4.5 gm q6-8 hr    CrCl <20 ml/min or ESRD [] Zosyn 2.25 gm q6-8 hr [] Zosyn 3.375 gm IV q12h, infuse over 4 hr    [] Zosyn 3.375 gm q6-8 hr     [] Zosyn 4.5 gm q6-8 hr      Thank you,    Noris Jamison, PharmD   6/10/2021 10:42 AM

## 2021-06-11 NOTE — PROGRESS NOTES
Renal Progress Note    Assessment and Plan:    65y.o. year old male with history s/f T2DM, HLD who presented for worsening SOB for 2 weeks. Pt currently intubated in the ICU. Nephrology consulted for CHARLES on CKD. Remains on high amount of O2. Renal u/s with 13.9 cm kidneys w/o hydro. ua 1+ proteinuria.  + blood.       1. CHARLES on CKD: most likely 2/2 contrast induced nephropathy (had CTPA on 6/2) , however function probably already low in setting of bradycardia, baseline Scr ~1.3? (was this in 11/20)  2. Acute on chronic hypoxic/hypercapnic respiratory failure  3. Hypokalemia  4. Anemia    Plan:  Cont high dose diuretics lasix drip and diuril  Had detailed discussion with wife about possible RRT - even short term to get off more fluid  She said he would not want dialysis done and this is something they had spoke about before  Poor prognosis  On potassium - levels are ok      Patient Active Problem List:     Venous stasis ulcer of right lower leg with edema of right lower leg ([address]     Uncontrolled diabetes mellitus with complication, without long-term current use of insulin (HCC)     Morbid obesity ([address]     Chronic kidney disease (CKD)     Non-pressure 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 layer exposed ([address]     Lymphedema of both lower extremities     Noncompliance with medication regimen     Shortness of breath     Bilateral carotid bruits     Dyslipidemia     Edema of right lower leg due to peripheral venous insufficiency     Acute respiratory failure with hypoxia ([address]      Subjective:   Admit Date: 6/2/2021    Interval History: increased lasix drip to 15 and placed on diuril bid. uo not wonderful but ok. Oxygenation worse. Up to 100%.   Wbc up      Medications:   Scheduled Meds:   insulin glargine  25 Units Subcutaneous BID    piperacillin-tazobactam  3,375 mg Intravenous Q8H    vancomycin (VANCOCIN) intermittent dosing (placeholder)   Other RX Placeholder    chlorothiazide (DIURIL) IVPB  500 mg Intravenous Q12H    hydrALAZINE  25 mg Oral 2 times per day    potassium bicarb-citric acid  20 mEq Oral BID    famotidine (PEPCID) injection  20 mg Intravenous Daily    sodium chloride  500 mL Intravenous Once    aspirin  324 mg Oral Daily    isosorbide dinitrate  20 mg Oral TID    insulin lispro  0-18 Units Subcutaneous Q4H    polyethylene glycol  17 g Per NG tube Daily    heparin (porcine)  5,000 Units Subcutaneous 3 times per day    sodium chloride flush  5-40 mL Intravenous 2 times per day    chlorhexidine  15 mL Mouth/Throat BID    rosuvastatin  10 mg Oral Nightly     Continuous Infusions:   furosemide (LASIX) infusion 15 mg/hr (06/10/21 1201)    IV infusion builder 6 mL/hr at 06/11/21 0805    propofol 18 mcg/kg/min (06/11/21 0910)    sodium chloride      sodium chloride      dextrose         CBC:   Recent Labs     06/10/21  0600 06/10/21  1005 06/11/21  0555   WBC 12.6*  --  13.0*   HGB 10.7* 12.2* 9.7*     --  339     CMP:    Recent Labs     06/09/21  0600 06/10/21  0600 06/10/21  0947 06/10/21  1005 06/11/21  0556   * 133*  --   --  130*   K 4.5 4.3  --   --  4.2   CL 86* 84*  --   --  84*   CO2 37* 35*  --   --  34*   BUN 91* 105*  --   --  116*   CREATININE 3.31* 3.33* 3.8* 4.1* 3.28*   GLUCOSE 287* 308*  --   --  238*   CALCIUM 8.9 9.2  --   --  9.0   LABGLOM 18.4* 18.3* 16* 14* 18.6*     Troponin: No results for input(s): TROPONINI in the last 72 hours. BNP: No results for input(s): BNP in the last 72 hours. INR: No results for input(s): INR in the last 72 hours. Lipids: No results for input(s): CHOL, LDLDIRECT, TRIG, HDL, AMYLASE, LIPASE in the last 72 hours. Liver: No results for input(s): AST, ALT, ALKPHOS, PROT, LABALBU, BILITOT in the last 72 hours. Invalid input(s): BILDIR  Iron:  No results for input(s): IRONS, FERRITIN in the last 72 hours. Invalid input(s): LABIRONS  Urinalysis: No results for input(s): UA in the last 72 hours.     Objective:   Vitals: BP (!) 130/46   Pulse 82   Temp 100.2 °F (37.9 °C) (Bladder)   Resp 24   Ht 6' 1\" (1.854 m)   Wt (!) 393 lb (178.3 kg)   SpO2 90%   BMI 51.85 kg/m²    Wt Readings from Last 3 Encounters:   06/11/21 (!) 393 lb (178.3 kg)   11/12/20 (!) 338 lb (153.3 kg)   11/03/20 (!) 328 lb (148.8 kg)      24HR INTAKE/OUTPUT:      Intake/Output Summary (Last 24 hours) at 6/11/2021 0937  Last data filed at 6/11/2021 0506  Gross per 24 hour   Intake 2144 ml   Output 2100 ml   Net 44 ml       Constitutional:  Intubated, sedated  Skin:normal, no rash  HEENT:sclera anicteric.   Head atraumatic normocephalic  Neck:supple with no thyromegally  Cardiovascular:  S1, S2 without m/r/g   Respiratory:  CTA B without w/r/r   Abdomen: +bs, soft, nt  Ext: 1+ LE edema  Musculoskeletal:Intact  Neuro:sedated      Electronically signed by Coy Jaime MD on 6/11/2021 at 9:37 AM

## 2021-06-11 NOTE — PROGRESS NOTES
Progress Note  Patient: Hussein Melendrez  Unit/Bed: SL04/OL59-39  YOB: 1950  MRN: [id number]  Acct: [de-identified]   Admitting Diagnosis: Acute respiratory failure with hypoxia [facility]) [J96.01]  Admit Date:  6/2/2021  Hospital Day: 9    Chief Complaint:  Resp failure     Histories:  Past Medical History:   Diagnosis Date    Hyperlipidemia     Type II or unspecified type diabetes mellitus without mention of complication, not stated as uncontrolled      Past Surgical History:   Procedure Laterality Date    CYSTOSCOPY  04/19/2017    FLEXIBLE W/COMPLEX CYSTOMETROGRAMEMG ANAL-COMPLEX UROFLOW-US PROSTATE    SHOULDER SURGERY      WRIST FUSION       Family History   Problem Relation Age of Onset    Prostate Cancer Father     Cancer Sister      Social History     Socioeconomic History    Marital status:      Spouse name: None    Number of children: None    Years of education: None    Highest education level: None   Occupational History    None   Tobacco Use    Smoking status: Former Smoker    Smokeless tobacco: Never Used   Substance and Sexual Activity    Alcohol use: Not Currently    Drug use: Never    Sexual activity: Not Currently   Other Topics Concern    None   Social History Narrative    None     Social Determinants of Health     Financial Resource Strain:     Difficulty of Paying Living Expenses:    Food Insecurity:     Worried About Running Out of Food in the Last Year:     Ran Out of Food in the Last Year:    Transportation Needs:     Lack of Transportation (Medical):      Lack of Transportation (Non-Medical):    Physical Activity:     Days of Exercise per Week:     Minutes of Exercise per Session:    Stress:     Feeling of Stress :    Social Connections:     Frequency of Communication with Friends and Family:     Frequency of Social Gatherings with Friends and Family:     Attends Mu-ism Services:     Active Member of Clubs or Organizations:     Attends Club or Organization Meetings:     Marital Status:    Intimate Partner Violence:     Fear of Current or Ex-Partner:     Emotionally Abused:     Physically Abused:     Sexually Abused:        Subjective/HPI   Diuresuing well but net negative balance remains minimum. Now w recurrent fever. Supine all night. FIO2 up to 100% with  12 PEEP. Wife in room. Wife declined RRT or HD    EKG:  SR 98 but having frequent PAF. Rate is controlled. Review of Systems:   Review of Systems  vented    Physical Examination:    BP (!) 130/46   Pulse 82   Temp 100.2 °F (37.9 °C) (Bladder)   Resp 24   Ht 6' 1\" (1.854 m)   Wt (!) 393 lb (178.3 kg)   SpO2 90%   BMI 51.85 kg/m²    Physical Exam   Constitutional: No distress. He appears acutely ill. HENT:   Normal cephalic and Atraumatic   Eyes: Pupils are equal, round, and reactive to light. Neck: Thyroid normal. No JVD present. No neck adenopathy. No thyromegaly present. Cardiovascular: Intact distal pulses and normal pulses. An irregularly irregular rhythm present. Murmur heard. Pulmonary/Chest: Effort normal and breath sounds normal. He has no wheezes. He has no rales. He exhibits no tenderness. Abdominal: Soft. Bowel sounds are normal. There is no abdominal tenderness. Musculoskeletal:         General: Edema present. No tenderness. Normal range of motion. Cervical back: Normal range of motion and neck supple. Neurological: He exhibits altered mental status. Skin: Skin is warm. No cyanosis. Nails show no clubbing.        LABS:  CBC:   Lab Results   Component Value Date    WBC 13.0 06/11/2021    RBC 4.12 06/11/2021    HGB 9.7 06/11/2021    HCT 30.2 06/11/2021    MCV 73.4 06/11/2021    MCH 23.5 06/11/2021    MCHC 32.0 06/11/2021    RDW 18.1 06/11/2021     06/11/2021     CBC with Differential:    Lab Results   Component Value Date    WBC 13.0 06/11/2021    RBC 4.12 06/11/2021    HGB 9.7 06/11/2021    HCT 30.2 06/11/2021     06/11/2021    MCV 73.4 06/11/2021    MCH 23.5 06/11/2021    MCHC 32.0 06/11/2021    RDW 18.1 06/11/2021    BANDSPCT 2 06/10/2021    METASPCT 1 06/10/2021    LYMPHOPCT 10.8 06/11/2021    MONOPCT 8.8 06/11/2021    BASOPCT 0.7 06/11/2021    MONOSABS 1.2 06/11/2021    LYMPHSABS 1.4 06/11/2021    EOSABS 0.3 06/11/2021    BASOSABS 0.1 06/11/2021     CMP:    Lab Results   Component Value Date     06/11/2021    K 4.2 06/11/2021    CL 84 06/11/2021    CO2 34 06/11/2021     06/11/2021    CREATININE 3.28 06/11/2021    GFRAA 22.5 06/11/2021    LABGLOM 18.6 06/11/2021    GLUCOSE 238 06/11/2021    PROT 7.6 06/02/2021    LABALBU 4.0 06/02/2021    CALCIUM 9.0 06/11/2021    BILITOT 0.3 06/02/2021    ALKPHOS 77 06/02/2021    AST 24 06/02/2021    ALT 27 06/02/2021     BMP:    Lab Results   Component Value Date     06/11/2021    K 4.2 06/11/2021    CL 84 06/11/2021    CO2 34 06/11/2021     06/11/2021    LABALBU 4.0 06/02/2021    CREATININE 3.28 06/11/2021    CALCIUM 9.0 06/11/2021    GFRAA 22.5 06/11/2021    LABGLOM 18.6 06/11/2021    GLUCOSE 238 06/11/2021     Magnesium:    Lab Results   Component Value Date    MG 2.1 06/03/2021     Troponin:    Lab Results   Component Value Date    TROPONINI 0.036 06/03/2021        Active Hospital Problems    Diagnosis Date Noted    Acute respiratory failure with hypoxia ([facility] [J96.01] 06/02/2021     Priority: Low        Assessment/Plan:  1. Hypoxic Respiratory failure - vented  2. Volume overloaded -  acute on Chronic DHF -a Lasix gtt and Metolazone. Minimize/concentrate pt's drips etc to attain negative fluid balance. disccussed with pharmacy. 3. HTN- added Isosorbide Dinitrate and Hydralazine per OG - for Afterload reduction. 4. Demand ischemia - ASA. No BB due to Bradycardia. 5. Echo EF 55 RVSP 32   6. Likely ÓSCAR   7. Remains critically ill.    8. Discussed with wife and Dr. Sreedhar Reilly-  Pt would not have wanted heroic measures to include life sustaining measures such as HD, CPR or intubation. At this point we will continue supportive care but no further escalation such as CPR and certainly no Tracheostomy.           Electronically signed by Tomeka Mercer MD on 6/11/2021 at 10:15 AM

## 2021-06-11 NOTE — PROGRESS NOTES
Pulmonary & Critical Care Medicine ICU Progress Note    Subjective:     Overnight events reported. He does remain on a PEEP of 10 with 90% FiO2. His O2 was increased yesterday during the day. He does continue with sedation.     EXAM:  General: Sedated on the support of the ventilator, no acute distress  HEENT: Normocephalic, atraumatic, oral ETT and OG in place, periorbital edema improving  Lungs : diminished breath sounds bilaterally, no wheezes, no rhonchi, no ventilator dyssynchrony  Heart: Regular rate  ABD: Obese, positive bowel sounds but decreased, no guarding, no rigidity  Extremities : Warm, dry, edema noted  Neuro: Sedated on propofol and fentanyl drips  Skin: No rashes appreciated    MV Settings:  Vent Mode: AC/VC Rate Set: 18 bmp/Vt Ordered: 500 mL/ /FiO2 : 100 %     Recent Labs     06/10/21  0947 06/10/21  1005   PHART 7.418 7.426   [id number] 55* 56*   PO2ART 40* 48*         IV:   furosemide (LASIX) infusion 15 mg/hr (06/10/21 1201)    IV infusion builder 7 mL/hr at 06/11/21 0005    propofol 25 mcg/kg/min (06/11/21 0511)    sodium chloride      sodium chloride      dextrose         Vitals:  BP (!) 130/46   Pulse 77   Temp 100.2 °F (37.9 °C) (Bladder)   Resp 20   Ht 6' 1\" (1.854 m)   Wt (!) 393 lb (178.3 kg)   SpO2 91%   BMI 51.85 kg/m²          Intake/Output Summary (Last 24 hours) at 6/11/2021 0756  Last data filed at 6/11/2021 0506  Gross per 24 hour   Intake 2274 ml   Output 2100 ml   Net 174 ml       Medications:  Scheduled Meds:   insulin glargine  25 Units Subcutaneous BID    piperacillin-tazobactam  3,375 mg Intravenous Q8H    vancomycin (VANCOCIN) intermittent dosing (placeholder)   Other RX Placeholder    chlorothiazide (DIURIL) IVPB  500 mg Intravenous Q12H    hydrALAZINE  25 mg Oral 2 times per day    potassium bicarb-citric acid  20 mEq Oral BID    famotidine (PEPCID) injection  20 mg Intravenous Daily    sodium chloride  500 mL Intravenous Once    aspirin  324 mg Oral Daily    isosorbide dinitrate  20 mg Oral TID    insulin lispro  0-18 Units Subcutaneous Q4H    polyethylene glycol  17 g Per NG tube Daily    heparin (porcine)  5,000 Units Subcutaneous 3 times per day    sodium chloride flush  5-40 mL Intravenous 2 times per day    chlorhexidine  15 mL Mouth/Throat BID    rosuvastatin  10 mg Oral Nightly       Labs:   CBC:   Recent Labs     06/09/21  0600 06/10/21  0600 06/10/21  0947 06/10/21  1005 06/11/21  0555   WBC 10.2 12.6*  --   --  13.0*   HGB 10.3* 10.7* 12.0* 12.2* 9.7*   HCT 32.8* 34.3*  --   --  30.2*   MCV 75.1* 74.5*  --   --  73.4*    327  --   --  339     BMP:   Recent Labs     06/09/21  0600 06/10/21  0600 06/10/21  0947 06/10/21  1005 06/11/21  0556   * 133*  --   --  130*   K 4.5 4.3  --   --  4.2   CL 86* 84*  --   --  84*   CO2 37* 35*  --   --  34*   BUN 91* 105*  --   --  116*   CREATININE 3.31* 3.33* 3.8* 4.1* 3.28*     LIVER PROFILE: No results for input(s): AST, ALT, LIPASE, BILIDIR, BILITOT, ALKPHOS in the last 72 hours. Invalid input(s): AMYLASE,  ALB  PT/INR: No results for input(s): PROTIME, INR in the last 72 hours. APTT: No results for input(s): APTT in the last 72 hours. UA:  Recent Labs     06/10/21  1044   COLORU ORANGE*   PHUR 5.0   WBCUA 6-9*   RBCUA >100*   BACTERIA Negative   CLARITYU TURBID*   SPECGRAV 1.014   LEUKOCYTESUR TRACE*   UROBILINOGEN 1.0   BILIRUBINUR Negative   BLOODU LARGE*   GLUCOSEU Negative       Diagnostic imaging:    Echocardiogram report from 6/3 reviewed--EF 55%, RVSP 32 mmHg, mild concentric LVH, no mitral valve stenosis, no aortic valve stenosis    Assessment/Plan:    1. Acute on chronic hypoxic and hypercapnic respiratory failure-he does remain in the supine position. His PEEP remains at 10, however was increased to 12 this morning given that his FiO2 is at 90%. His FiO2 and PEEP will be weaned as able. Repeat blood gas will be obtained this morning given the changes in his PEEP.   I did have a conversation with the patient's wife on Thursday in regards to the possibility of a tracheostomy tube and PEG tube. She stated at that time that the patient never wanted dialysis and therefore she does not feel that a tracheostomy tube would be within his wishes. We will continue to have these conversations in regards to goals of care. 2. Volume overload-he currently is being diuresed with a Lasix drip however he has been overall net even over the last 24 hours. Nephrology does continue to follow. His metolazone was stopped yesterday and he was started on Diuril. He does remain on Lasix. 3. Acute kidney injury on chronic kidney disease-nephrology does continue to follow. He currently is receiving Lasix drip and Diuril for diuresis. Patient does have known chronic lymphedema. His creatinine is stable from this morning. His BUN has been slowly increasing. 4. Demand ischemia-cardiology does continue to follow. The patient currently is receiving aspirin. Beta-blockers are on hold at this time secondary to bradycardia upon presentation. Hydralazine and isosorbide remain in place. 5. Diabetes mellitus with hyperglycemia -he did have some low blood sugars upon presentation, however now his blood sugars are elevated. Lantus was added back to his regimen albeit at a lower dose. His sliding scale insulin coverage was increased. His Lantus will be further adjusted given that he does remain with hyperglycemia. 6. Febrile -patient was febrile on Wednesday night/Thursday. Repeat cultures were obtained. Procalcitonin was obtained as well. He was started empirically on vancomycin and Zosyn. All cultures remain pending at this point. His antibiotics can be adjusted further once his cultures do return. Nutrition: Tolerating tube feeds    Prophylaxis: Pepcid for GI prophylaxis. Heparin subcu for DVT prophylaxis.     Code Status: Full code    This is a 65 y.o. male who is critically ill secondary to respiratory failure. I have spent a total of 43 minutes of critical care time with this patient, review of the chart, and discussion with the bedside staff; excluding any billable procedures.     Electronically signed by Deidra Perales DO, on 6/11/2021 at 7:56 AM

## 2021-06-11 NOTE — PROGRESS NOTES
94 gm protein, ~ 700 ml free water  · Goal TF & Flush Orders Provides: with change to 30 ml/hr with protein modular TID = 1032 kcal (+ 591 propofol kcal), 141 gm protein, ~ 900 ml free water    Anthropometric Measures:  · Height: 6' 1\" (185.4 cm)  · Current Body Weight: 393 lb (178.3 kg) (6/11)   · Admission Body Weight: 340 lb (154.2 kg) (estimated)    · Usual Body Weight: 338 lb (153.3 kg) (11/20/20-office visit)     · Ideal Body Weight: 184 lbs; % Ideal Body Weight  > 100%   · BMI: 51.9  · BMI Categories: Obese Class 3 (BMI 40.0 or greater)       Nutrition Diagnosis:   · Inadequate oral intake related to impaired respiratory function as evidenced by intubation    Nutrition Interventions:   Food and/or Nutrient Delivery:  Modify Tube Feeding (Increase TF goal rate to 30 ml/hr. Increase protein modulars to 3 times daily. Continue with water flushes of 50 ml every 4 hrs or as per MD)  Nutrition Education/Counseling:  No recommendation at this time   Coordination of Nutrition Care:  Continue to monitor while inpatient    Goals:  TF to meet range of estimated energy/protein needs with tolerance.   gluc 160-180, anticipate wt loss as edema resolves       Nutrition Monitoring and Evaluation:   Food/Nutrient Intake Outcomes:  Enteral Nutrition Intake/Tolerance, IVF Intake  Physical Signs/Symptoms Outcomes:  Biochemical Data, Weight, Fluid Status or Edema     Electronically signed by Roque Sheffield RD, LD on 6/11/21 at 1:09 PM EDT

## 2021-06-11 NOTE — PLAN OF CARE
Nutrition Problem #1: Inadequate oral intake  Intervention: Food and/or Nutrient Delivery: Modify Tube Feeding (Increase TF goal rate to 30 ml/hr. Increase protein modulars to 3 times daily. Continue with water flushes of 50 ml every 4 hrs or as per MD)  Nutritional Goals: TF to meet range of estimated energy/protein needs with tolerance.   gluc 160-180, anticipate wt loss as edema resolves

## 2021-06-11 NOTE — PROGRESS NOTES
Hospitalist Progress Note      Date of Admission: 6/2/2021  Chief Complaint:    Chief Complaint   Patient presents with    Shortness of Breath     Subjective:  intubated  Wife at bedside, updated    Medications:    Infusion Medications    furosemide (LASIX) infusion 15 mg/hr (06/10/21 1201)    IV infusion builder 5 mL/hr at 06/11/21 1147    propofol 18 mcg/kg/min (06/11/21 0910)    sodium chloride      sodium chloride      dextrose       Scheduled Medications    insulin glargine  25 Units Subcutaneous BID    piperacillin-tazobactam  3,375 mg Intravenous Q8H    vancomycin (VANCOCIN) intermittent dosing (placeholder)   Other RX Placeholder    chlorothiazide (DIURIL) IVPB  500 mg Intravenous Q12H    hydrALAZINE  25 mg Oral 2 times per day    potassium bicarb-citric acid  20 mEq Oral BID    famotidine (PEPCID) injection  20 mg Intravenous Daily    sodium chloride  500 mL Intravenous Once    aspirin  324 mg Oral Daily    isosorbide dinitrate  20 mg Oral TID    insulin lispro  0-18 Units Subcutaneous Q4H    polyethylene glycol  17 g Per NG tube Daily    heparin (porcine)  5,000 Units Subcutaneous 3 times per day    sodium chloride flush  5-40 mL Intravenous 2 times per day    chlorhexidine  15 mL Mouth/Throat BID    rosuvastatin  10 mg Oral Nightly     PRN Meds: docusate, hydrALAZINE, sodium chloride flush, sodium chloride, sodium chloride, acetaminophen **OR** acetaminophen, ondansetron **OR** ondansetron, nitroGLYCERIN, potassium chloride **OR** potassium alternative oral replacement **OR** potassium chloride, magnesium sulfate, ipratropium-albuterol, glucose, dextrose, glucagon (rDNA), dextrose    Intake/Output Summary (Last 24 hours) at 6/11/2021 1340  Last data filed at 6/11/2021 1100  Gross per 24 hour   Intake 2344 ml   Output 2100 ml   Net 244 ml     Exam:  BP (!) 113/41   Pulse 75   Temp 100.2 °F (37.9 °C) (Rectal)   Resp 23   Ht 6' 1\" (1.854 m)   Wt (!) 393 lb (178.3 kg)   SpO2 (!) obtained for further evaluation if clinical concern warrants to rule out malignancy. All CT scans at this facility use dose modulation, iterative reconstruction, and/or weight based dosing when appropriate to reduce radiation dose to as low as reasonably achievable. XR CHEST PORTABLE   Final Result   Status post intubation and NG tube placement. There is an area of consolidation in the left lung base. There is possible left pleural effusion. Assessment/Plan:    1. Acute hypoxic respiratory failure secondary to bilateral lower lobe collapse; consulting with pulmonology/intensivist; vent management per intensivist.    2. Chf: on iv diuresis, following with nephrology and cardiology  3. elev trop: following with cards  4. Jeremy: nippv, following with pulm and cardiology  5. DMII:  SSI while NPO  6. CKD:  Renally dose meds  7. Functional Status: Fall precautions. Up with assistance. PT OT  8. Diet: NPO  9.  DVT ppx: Lovenox SCDs    Stella Mercer MD ,MD

## 2021-06-11 NOTE — FLOWSHEET NOTE
Received patient from [facility]. Skin on coccyx intact. Mepilex on sacrum and heels. Breakdown noted on bilateral Ears- open to air. Patient's SpO2 is floating between 88-91%. Dr Syd Posey aware. Patient will not be proned any more. Changed ETT pavon to danae with RT. Shaved patient's beard and provided grooming services as facial hair was overgrown and lacking hyigene- wife thankful. Patient's fio2 100%. Increased PEEP to 12 from 10. Per Dr Syd Posey. ABGs done post PEEP change- Dr Syd Posey aware. PLAT 34 this morning around 0930. Decreased TV from 500 to 450. PLAT now 28. Turned patient q2hrs. Rounds completed with Dr Syd Posey. Discussed glucoses- new lantus order. Given. D/heidi lopressor PRN due to Dr Jorge Labor wishes for no beta blockers. Wife spoke to Dr Cherie Espinosa- he was suggesting dialysis- wife refused. Dr Montse Godfrey asked about compressions- wife denied. Patient made a DNR CCA. Patient had a large BM today- brown in color. No concerns. Changed jurado bag as it was leaking.

## 2021-06-12 NOTE — PROGRESS NOTES
Pharmacy Vancomycin Consult     Vancomycin Day: 3  Current Dosin mg dose given x 1 on 6/10/21 dosing based on levels    Temp 24hr max:  100.4    Recent Labs     21  0555 21  0556 21  0544   BUN  --  116* 138*   CREATININE  --  3.28* 4.33*   WBC 13.0*  --  13.3*       Intake/Output Summary (Last 24 hours) at 2021 0730  Last data filed at 2021 0600  Gross per 24 hour   Intake 2425.68 ml   Output 1575 ml   Net 850.68 ml     Cultures  Recent Labs     06/10/21  1154 06/10/21  1041 21  1247 21  1239   BC No Growth to date. Any change in status will be called. --   --   --    BLOODCULT2 No Growth to date. Any change in status will be called. --   --   --    LABGRAM  --  Moderate WBC's  Rare epithelial cells  Moderate Gram negative rods     < >  --    ORG  --  Gram negative jaquelin*  --   --    COVID19  --   --   --  Not Detected    < > = values in this interval not displayed. Height: 6' 1\" (185.4 cm), Weight: (!) 409 lb 9.8 oz (185.8 kg), Body mass index is 54.04 kg/m². Estimated Creatinine Clearance: 27 mL/min (A) (based on SCr of 4.33 mg/dL (H)). .    Trough:  Recent Labs     21  2000   VANCOTROUGH 10.4      Assessment/Plan:  Trough is therapeutic at 10.4--will re-dose with Vancomycin 1750 mg x 1 with trough in 36 hours with repeat dosing based on levels  Timing of future trough levels may be adjusted based on culture results, renal function, and clinical response.     Thank you,  Everardo Marina [facility]  2021  7:33 AM

## 2021-06-12 NOTE — PROGRESS NOTES
Renal Progress Note    Assessment and Plan:    65y.o. year old male with history s/f T2DM, HLD who presented for worsening SOB for 2 weeks. Pt currently intubated in the ICU. Nephrology consulted for CHARLES on CKD. Remains on high amount of O2. Renal u/s with 13.9 cm kidneys w/o hydro. ua 1+ proteinuria.  + blood.       1. CHARLES on CKD: most likely 2/2 contrast induced nephropathy (had CTPA on 6/2) , however function probably already low in setting of bradycardia, baseline Scr ~1.3? (was this in 11/20)  2. Acute on chronic hypoxic/hypercapnic respiratory failure  3. Hypokalemia  4. Anemia    Plan:  Cont high dose diuretics lasix drip   Had detailed discussion with wife about possible RRT yesterday and today again - even short term to get off more fluid  She said he would not want dialysis done and this is something they had spoke about before  Poor prognosis  On potassium - was dc'd  D/c hydralazine  She plans on comfort care by Monday. Agree with this      Patient Active Problem List:     Venous stasis ulcer of right lower leg with edema of right lower leg ([address]     Uncontrolled diabetes mellitus with complication, without long-term current use of insulin (HCC)     Morbid obesity (HCC)     Chronic kidney disease (CKD)     Non-pressure 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 layer exposed ([address]     Lymphedema of both lower extremities     Noncompliance with medication regimen     Shortness of breath     Bilateral carotid bruits     Dyslipidemia     Edema of right lower leg due to peripheral venous insufficiency     Acute respiratory failure with hypoxia (HCC)      Subjective:   Admit Date: 6/2/2021    Interval History: increased lasix drip to 15. Overall worse. Bun/cr up. uo down. fio2 100%.   Low grade fevers      Medications:   Scheduled Meds:   insulin glargine  35 Units Subcutaneous BID    piperacillin-tazobactam  3,375 mg Intravenous Q8H    hydrALAZINE  25 mg Oral 2 times per day    famotidine (PEPCID) injection  20 mg Intravenous Daily    sodium chloride  500 mL Intravenous Once    aspirin  324 mg Oral Daily    isosorbide dinitrate  20 mg Oral TID    insulin lispro  0-18 Units Subcutaneous Q4H    polyethylene glycol  17 g Per NG tube Daily    heparin (porcine)  5,000 Units Subcutaneous 3 times per day    sodium chloride flush  5-40 mL Intravenous 2 times per day    chlorhexidine  15 mL Mouth/Throat BID    rosuvastatin  10 mg Oral Nightly     Continuous Infusions:   furosemide (LASIX) infusion 15 mg/hr (06/11/21 2352)    IV infusion builder 5 mL/hr at 06/11/21 1848    propofol 25 mcg/kg/min (06/12/21 0823)    sodium chloride      sodium chloride      dextrose         CBC:   Recent Labs     06/11/21  0555 06/12/21  0544 06/12/21  0916   WBC 13.0* 13.3*  --    HGB 9.7* 9.3* 10.7*    376  --      CMP:    Recent Labs     06/10/21  0600 06/11/21  0556 06/12/21  0544 06/12/21  0916   * 130* 132*  --    K 4.3 4.2 4.8  --    CL 84* 84* 83*  --    CO2 35* 34* 35*  --    * 116* 138*  --    CREATININE 3.33* 3.28* 4.33* 4.7*   GLUCOSE 308* 238* 229*  --    CALCIUM 9.2 9.0 9.3  --    LABGLOM 18.3* 18.6* 13.5* 12*     Troponin: No results for input(s): TROPONINI in the last 72 hours. BNP: No results for input(s): BNP in the last 72 hours. INR: No results for input(s): INR in the last 72 hours. Lipids: No results for input(s): CHOL, LDLDIRECT, TRIG, HDL, AMYLASE, LIPASE in the last 72 hours. Liver: No results for input(s): AST, ALT, ALKPHOS, PROT, LABALBU, BILITOT in the last 72 hours. Invalid input(s): BILDIR  Iron:  No results for input(s): IRONS, FERRITIN in the last 72 hours. Invalid input(s): LABIRONS  Urinalysis: No results for input(s): UA in the last 72 hours.     Objective:   Vitals: BP (!) 113/42   Pulse 73   Temp 100 °F (37.8 °C) (Bladder)   Resp 24   Ht 6' 1\" (1.854 m)   Wt (!) 409 lb 9.8 oz (185.8 kg)   SpO2 90%   BMI 54.04 kg/m²    Wt Readings from Last 3 Encounters:   06/12/21 (!) 409 lb 9.8 oz (185.8 kg)   11/12/20 (!) 338 lb (153.3 kg)   11/03/20 (!) 328 lb (148.8 kg)      24HR INTAKE/OUTPUT:      Intake/Output Summary (Last 24 hours) at 6/12/2021 1027  Last data filed at 6/12/2021 0600  Gross per 24 hour   Intake 2325.68 ml   Output 1575 ml   Net 750.68 ml       Constitutional:  Intubated, sedated  Skin:normal, no rash  HEENT:sclera anicteric.   Head atraumatic normocephalic  Neck:supple with no thyromegally  Cardiovascular:  S1, S2 without m/r/g   Respiratory:  CTA B without w/r/r   Abdomen: +bs, soft, nt  Ext: 1+ LE edema  Musculoskeletal:Intact  Neuro:sedated      Electronically signed by Maryan Bejarano MD on 6/12/2021 at 10:27 AM

## 2021-06-12 NOTE — PROGRESS NOTES
Pulmonary & Critical Care Medicine ICU Progress Note    Subjective:     No overnight events reported. His PEEP and FiO2 have increased over the last 24 to 48 hours. He did have a low-grade temperature overnight. A sputum culture does demonstrate evidence of gram-negative bacillus.     EXAM:  General: Sedated on the support of the ventilator, no acute distress  HEENT: Normocephalic, atraumatic, oral ETT and OG in place, periorbital edema improving  Lungs : diminished breath sounds bilaterally, no wheezes, no ventilator dyssynchrony  Heart: Regular rate  ABD: Obese, positive bowel sounds but decreased, no guarding, no rigidity  Extremities : Warm, dry, edema noted  Neuro: Sedated on propofol and fentanyl drips  Skin: No rashes appreciated    MV Settings:  Vent Mode: AC/VC Rate Set: 18 bmp/Vt Ordered: 450 mL/ /FiO2 : 100 %     Recent Labs     06/10/21  1005 06/11/21  0847   PHART 7.426 7.442   [id number] 56* 53*   PO2ART 48* 60*         IV:   furosemide (LASIX) infusion 15 mg/hr (06/11/21 2972)    IV infusion builder 5 mL/hr at 06/11/21 1848    propofol 25 mcg/kg/min (06/12/21 0823)    sodium chloride      sodium chloride      dextrose         Vitals:  BP (!) 112/44   Pulse 76   Temp 100 °F (37.8 °C) (Rectal)   Resp 24   Ht 6' 1\" (1.854 m)   Wt (!) 409 lb 9.8 oz (185.8 kg)   SpO2 93%   BMI 54.04 kg/m²          Intake/Output Summary (Last 24 hours) at 6/12/2021 0849  Last data filed at 6/12/2021 0600  Gross per 24 hour   Intake 2325.68 ml   Output 1575 ml   Net 750.68 ml       Medications:  Scheduled Meds:   insulin glargine  35 Units Subcutaneous BID    piperacillin-tazobactam  3,375 mg Intravenous Q8H    hydrALAZINE  25 mg Oral 2 times per day    potassium bicarb-citric acid  20 mEq Oral BID    famotidine (PEPCID) injection  20 mg Intravenous Daily    sodium chloride  500 mL Intravenous Once    aspirin  324 mg Oral Daily    isosorbide dinitrate  20 mg Oral TID    insulin lispro  0-18 Units will be decreased as able. I did have a conversation with the patient's wife on Thursday and Friday in regards to the possibility of a tracheostomy tube and PEG tube. She stated at that time that the patient never wanted dialysis and therefore she does not feel that a tracheostomy tube would be within his wishes. She indicated yesterday that his goals of care are consistent with a DNR CCA as well. 2. Volume overload-he currently is being diuresed with a Lasix drip however he has been overall net positive over the last 24 hours. Nephrology does continue to follow. His metolazone was stopped and he was started on Diuril. He does remain on Lasix. 3. Acute kidney injury on chronic kidney disease-nephrology does continue to follow. He currently is receiving Lasix drip for diuresis. He had been on Diuril, however this was ordered for 4 doses. His BUN and creatinine did increase significantly today. His creatinine is 4.33. Patient does have known chronic lymphedema. 4. Demand ischemia-cardiology does continue to follow. The patient currently is receiving aspirin. Beta-blockers are on hold at this time secondary to bradycardia upon presentation. Hydralazine and isosorbide remain in place. 5. Diabetes mellitus with hyperglycemia -initially he did have some hypoglycemia, however now Lantus has been added back to his regimen. His blood sugars are better controlled, however still elevated. His Lantus was increased again today. 6. Febrile -he did have a low-grade fever again overnight of 100.4. Sputum culture demonstrates evidence of gram-negative bacillus. The final ID and sensitivities is currently pending. Procalcitonin slightly elevated. He was started empirically on vancomycin and Zosyn, however given the gram-negative bacillus in his sputum the vancomycin was discontinued. His antibiotics can be adjusted further once his cultures and sensitivities do return.   I did speak with

## 2021-06-12 NOTE — PROGRESS NOTES
Hospitalist Progress Note      Date of Admission: 6/2/2021  Chief Complaint:    Chief Complaint   Patient presents with    Shortness of Breath     Subjective:  intubated, sedated      Medications:    Infusion Medications    furosemide (LASIX) infusion 15 mg/hr (06/11/21 2702)    IV infusion builder 5 mL/hr at 06/11/21 1848    propofol 25 mcg/kg/min (06/12/21 0823)    sodium chloride      sodium chloride      dextrose       Scheduled Medications    insulin glargine  35 Units Subcutaneous BID    piperacillin-tazobactam  3,375 mg Intravenous Q8H    famotidine (PEPCID) injection  20 mg Intravenous Daily    sodium chloride  500 mL Intravenous Once    aspirin  324 mg Oral Daily    isosorbide dinitrate  20 mg Oral TID    insulin lispro  0-18 Units Subcutaneous Q4H    polyethylene glycol  17 g Per NG tube Daily    heparin (porcine)  5,000 Units Subcutaneous 3 times per day    sodium chloride flush  5-40 mL Intravenous 2 times per day    chlorhexidine  15 mL Mouth/Throat BID    rosuvastatin  10 mg Oral Nightly     PRN Meds: docusate, hydrALAZINE, sodium chloride flush, sodium chloride, sodium chloride, acetaminophen **OR** acetaminophen, ondansetron **OR** ondansetron, nitroGLYCERIN, potassium chloride **OR** potassium alternative oral replacement **OR** potassium chloride, magnesium sulfate, ipratropium-albuterol, glucose, dextrose, glucagon (rDNA), dextrose    Intake/Output Summary (Last 24 hours) at 6/12/2021 1341  Last data filed at 6/12/2021 0600  Gross per 24 hour   Intake 2225.68 ml   Output 1575 ml   Net 650.68 ml     Exam:  BP (!) 101/46   Pulse 79   Temp 99.9 °F (37.7 °C) (Bladder)   Resp 23   Ht 6' 1\" (1.854 m)   Wt (!) 409 lb 9.8 oz (185.8 kg)   SpO2 93%   BMI 54.04 kg/m²   Head: Normocephalic, atraumatic  Sclera clear  Neck JVD flat  Lungs: scattered crackles    Labs:   Recent Labs     06/10/21  0600 06/11/21  0555 06/12/21  0544 06/12/21  0916   WBC 12.6* 13.0* 13.3*  --    HGB 10.7* 9. 7* 9.3* 10.7*   HCT 34.3* 30.2* 29.6*  --     339 376  --      Recent Labs     06/10/21  0600 06/11/21  0556 06/12/21  0544 06/12/21  0916   * 130* 132*  --    K 4.3 4.2 4.8  --    CL 84* 84* 83*  --    CO2 35* 34* 35*  --    * 116* 138*  --    CREATININE 3.33* 3.28* 4.33* 4.7*   CALCIUM 9.2 9.0 9.3  --      No results for input(s): INR in the last 72 hours. No results for input(s): Carli Elder in the last 72 hours. Radiology:  XR CHEST PORTABLE   Final Result   PULMONARY VASCULAR REMAINS CONGESTED. COULD JUST COMPONENT OF UNDERLYING CHF. CORRELATE WITH CLINICAL AND CLINICAL HISTORY MEDICINE   BIBASILAR PATCHY TO COALESCENT INFILTRATES IN BOTH BASES WITH BILATERAL PLEURAL EFFUSIONS         US RETROPERITONEAL LIMITED   Final Result      BILATERAL RENAL CYSTS. XR CHEST PORTABLE   Final Result   Patient remains intubated. The findings may reflect mild pulmonary edema. There are small bilateral pleural effusions. XR CHEST PORTABLE   Final Result      STABLE CHEST COMPARED TO 6/5/2021. XR CHEST PORTABLE   Final Result      STABLE CHEST COMPARED TO 6/4/2021. XR CHEST PORTABLE   Final Result      Interval improvement but persistence of bilateral pleural effusions and atelectasis and/or infiltrate. IR PICC WO SQ PORT/PUMP > 5 YEARS   Final Result      CTA Chest W WO  (PE study)   Final Result      Limited study as described. No CT evidence of pulmonary embolism within main, right, and left pulmonary arteries, and bilateral interlobar arteries. Segmental and subsegmental pulmonary arteries cannot be assessed. Small bilateral pleural effusions, right greater than left. Bilateral lower lobe collapse with bilateral subsegmental atelectasis/pneumonia, mid and upper lungs. Cardiomegaly. Bilateral thyroid nodules.  Nonemergent thyroid sonography may be obtained for further evaluation if clinical concern warrants to rule out malignancy. All CT scans at this facility use dose modulation, iterative reconstruction, and/or weight based dosing when appropriate to reduce radiation dose to as low as reasonably achievable. XR CHEST PORTABLE   Final Result   Status post intubation and NG tube placement. There is an area of consolidation in the left lung base. There is possible left pleural effusion. Assessment/Plan:    1. Acute hypoxic respiratory failure secondary to bilateral lower lobe collapse; consulting with pulmonology/intensivist; vent management per intensivist.    2. Chf: on iv diuresis, following with nephrology and cardiology  3. Gram-negative rods on respiratory culture, following with pulmonology. 4. elev trop: following with cards  5. Jeremy: following with pulm and cardiology  6. DMII:  SSI while NPO  7. CKD:  Renally dose meds  8. Functional Status: Fall precautions. Up with assistance. PT OT  9. Diet: NPO  10.  DVT ppx: Lovenox SCDs    Sade Ronquillo MD ,MD

## 2021-06-12 NOTE — PROGRESS NOTES
Spiritual Care Services     Summary of Visit:  Family present at bedside, more family coming yet today, a large and supportive family they said. Family accepting and understanding of severity of pt's illness. Spoke about transitioning to comfort care in the future. Continue to be open to spiritual care going forward. Spiritual Assessment/Intervention/Outcomes:    Encounter Summary  Services provided to[de-identified] Patient  Referral/Consult From[de-identified] Rounding  Support System: Spouse, Family members  Continue Visiting: Yes  Complexity of Encounter: Low  Length of Encounter: 15 minutes  Routine  Type: Follow up  Assessment: Unable to respond (vent, but family present)  Intervention: Explored feelings, thoughts, concerns, Explored coping resources, Sustaining presence/ Ministry of presence  Outcome: Receptive        Sacraments  Sacrament of Sick-Anointing: Anointed                 Values / Beliefs  Do you have any ethnic, cultural, sacramental, or spiritual [employer] needs you would like us to be aware of while you are in the hospital?: No    Care Plan:    Continue to provide spiritual and emotional support to family and pt. Spiritual Care Services   Electronically signed by Chika Tabor on 6/12/21 at 11:18 AM EDT     To reach a  for emotional and spiritual support, place an [facility] consult request.   If a  is needed immediately, dial 0 and ask to page the on-call .

## 2021-06-12 NOTE — PROGRESS NOTES
Pt remained safe and free of injuries through the shift. TF increased to goal rate of 30ml/hr, minimal residuals through the night. Pt kept sedated to a RASS of -3 per order. Pt turned and repositioned Q2 hours to prevent skin breakdown.

## 2021-06-12 NOTE — PROGRESS NOTES
Friends and Family:     Frequency of Social Gatherings with Friends and Family:     Attends Oriental orthodox Services:     Active Member of Clubs or Organizations:     Attends Club or Organization Meetings:     Marital Status:    Intimate Partner Violence:     Fear of Current or Ex-Partner:     Emotionally Abused:     Physically Abused:     Sexually Abused:          Review of Systems:   Review of Systems  vented    Physical Examination:    BP (!) 111/50   Pulse 82   Temp 99.9 °F (37.7 °C) (Bladder)   Resp 22   Ht 6' 1\" (1.854 m)   Wt (!) 409 lb 9.8 oz (185.8 kg)   SpO2 92%   BMI 54.04 kg/m²    Physical Exam   Constitutional: No distress. He appears acutely ill. HENT:   Normal cephalic and Atraumatic   Eyes: Pupils are equal, round, and reactive to light. Neck: Thyroid normal. No JVD present. No neck adenopathy. No thyromegaly present. Cardiovascular: Intact distal pulses and normal pulses. An irregularly irregular rhythm present. Murmur heard. Pulmonary/Chest: Effort normal and breath sounds normal. He has no wheezes. He has no rales. He exhibits no tenderness. Abdominal: Soft. Bowel sounds are normal. There is no abdominal tenderness. Musculoskeletal:         General: Edema present. No tenderness. Normal range of motion. Cervical back: Normal range of motion and neck supple. Neurological: He exhibits altered mental status. Skin: Skin is warm. No cyanosis. Nails show no clubbing.        LABS:  CBC:   Lab Results   Component Value Date    WBC 13.3 06/12/2021    RBC 3.96 06/12/2021    HGB 10.7 06/12/2021    HCT 29.6 06/12/2021    MCV 74.7 06/12/2021    MCH 23.5 06/12/2021    MCHC 31.5 06/12/2021    RDW 17.9 06/12/2021     06/12/2021     CBC with Differential:    Lab Results   Component Value Date    WBC 13.3 06/12/2021    RBC 3.96 06/12/2021    HGB 10.7 06/12/2021    HCT 29.6 06/12/2021     06/12/2021    MCV 74.7 06/12/2021    MCH 23.5 06/12/2021    MCHC 31.5 06/12/2021 RDW 17.9 06/12/2021    BANDSPCT 2 06/10/2021    METASPCT 1 06/10/2021    LYMPHOPCT 12.1 06/12/2021    MONOPCT 8.2 06/12/2021    BASOPCT 0.5 06/12/2021    MONOSABS 1.1 06/12/2021    LYMPHSABS 1.6 06/12/2021    EOSABS 0.3 06/12/2021    BASOSABS 0.1 06/12/2021     CMP:    Lab Results   Component Value Date     06/12/2021    K 4.8 06/12/2021    CL 83 06/12/2021    CO2 35 06/12/2021     06/12/2021    CREATININE 4.7 06/12/2021    CREATININE 4.33 06/12/2021    GFRAA 15 06/12/2021    LABGLOM 12 06/12/2021    GLUCOSE 229 06/12/2021    PROT 7.6 06/02/2021    LABALBU 4.0 06/02/2021    CALCIUM 9.3 06/12/2021    BILITOT 0.3 06/02/2021    ALKPHOS 77 06/02/2021    AST 24 06/02/2021    ALT 27 06/02/2021     BMP:    Lab Results   Component Value Date     06/12/2021    K 4.8 06/12/2021    CL 83 06/12/2021    CO2 35 06/12/2021     06/12/2021    LABALBU 4.0 06/02/2021    CREATININE 4.7 06/12/2021    CREATININE 4.33 06/12/2021    CALCIUM 9.3 06/12/2021    GFRAA 15 06/12/2021    LABGLOM 12 06/12/2021    GLUCOSE 229 06/12/2021     Magnesium:    Lab Results   Component Value Date    MG 2.1 06/03/2021     Troponin:    Lab Results   Component Value Date    TROPONINI 0.036 06/03/2021        Active Hospital Problems    Diagnosis Date Noted    Acute respiratory failure with hypoxia ([facility] [address] [J96.01] 06/02/2021     Priority: Low        Assessment/Plan:  1. Hypoxic Respiratory failure -vent dependent  2. Volume overloaded -  acute on Chronic DHF -a Lasix gtt and Metolazone. 3. Paroxysmal atrial fibrillationcontrolled ventricular response. Consider full dose anticoagulation if okay with ICU attending. 4. HTN- Isosorbide Dinitrate and Hydralazine per OG - for Afterload reduction. 5. Demand ischemia - ASA. No BB due to Bradycardia. 6. Echo EF 55 RVSP 32   7. Likely ÓSCAR   8. Remains critically ill.    9. Per records pt would not have wanted heroic measures to include life sustaining measures such as HD, CPR or

## 2021-06-13 NOTE — FLOWSHEET NOTE
1100 handoff report completed with Sharath Jorgensen RN. Patient is sedated on fentanyl and propofol, intubated and on the vent, patient is afib rate controlled on tele, Patient flexes to pain on LLE and has a weak gag reflex. Patient also has lasix and heparin gtt running. Patient's wife at the bedside and states the plan is to do a compassionate wean tomorrow when all of patient's family is here. 1926 Handoff report with Jodee NIETO.

## 2021-06-13 NOTE — PROGRESS NOTES
Pharmacy Consult    Rojelio Foss is a 65 y.o. male for whom pharmacy has been consulted to dose Merrem    Patient Active Problem List   Diagnosis    Venous stasis ulcer of right lower leg with edema of right lower leg ([facility] [address]    Uncontrolled diabetes mellitus with complication, without long-term current use of insulin (HCC)    Morbid obesity (HCC)    Chronic kidney disease (CKD)    Non-pressure 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 layer exposed ([facility] [address]    Lymphedema of both lower extremities    Noncompliance with medication regimen    Shortness of breath    Bilateral carotid bruits    Dyslipidemia    Edema of right lower leg due to peripheral venous insufficiency    Acute respiratory failure with hypoxia (HCC)       Allergies:  Atorvastatin, Losartan, Pravastatin, and Simvastatin    Recent Labs     06/12/21  0916 06/13/21  0600   CREATININE 4.7* 4.97*   Estimated Creatinine Clearance: 23 mL/min (A) (based on SCr of 4.97 mg/dL (H)). Jill Neighbours Height: 6' 1\" (185.4 cm), Weight: (!) 409 lb 9.8 oz (185.8 kg), Body mass index is 54.04 kg/m². Assessment/Plan:  Will initiate Merrem renal dose 500 mg IV q 12 hours    Thank you for the consult. Will continue to follow.

## 2021-06-13 NOTE — PROGRESS NOTES
Pulmonary & Critical Care Medicine ICU Progress Note    Subjective:     No overnight events reported. His PEEP and FiO2 have remained the same. His sputum culture did return with ESBL E. coli.     EXAM:  General: Sedated on the support of the ventilator, no acute distress  HEENT: Normocephalic, atraumatic, oral ETT and OG in place, periorbital edema improving  Lungs : diminished breath sounds bilaterally with occasional rhonchi, no wheezes, no ventilator dyssynchrony  Heart: Regular rate  ABD: Obese, positive bowel sounds but decreased, no guarding, no rigidity  Extremities : Warm, dry, edema noted  Neuro: Sedated on propofol and fentanyl drips  Skin: No rashes appreciated    MV Settings:  Vent Mode: AC/VC Rate Set: 18 bmp/Vt Ordered: 450 mL/ /FiO2 : 100 %     Recent Labs     06/11/21  0847 06/12/21  0916   PHART 7.442 7.417   YVE3LEI 53* 54*   PO2ART 60* 60*         IV:   heparin (PORCINE) Infusion 18 Units/kg/hr (06/13/21 0545)    furosemide (LASIX) infusion 15 mg/hr (06/11/21 9092)    IV infusion builder 5 mL/hr at 06/12/21 1612    propofol 20 mcg/kg/min (06/13/21 0905)    sodium chloride      sodium chloride      dextrose         Vitals:  BP (!) 114/45   Pulse 75   Temp 99.3 °F (37.4 °C) (Bladder)   Resp 25   Ht 6' 1\" (1.854 m)   Wt (!) 409 lb 9.8 oz (185.8 kg)   SpO2 (!) 89%   BMI 54.04 kg/m²          Intake/Output Summary (Last 24 hours) at 6/13/2021 1054  Last data filed at 6/13/2021 0000  Gross per 24 hour   Intake 50 ml   Output    Net 50 ml       Medications:  Scheduled Meds:   insulin glargine  50 Units Subcutaneous BID    famotidine (PEPCID) injection  20 mg Intravenous Daily    sodium chloride  500 mL Intravenous Once    aspirin  324 mg Oral Daily    isosorbide dinitrate  20 mg Oral TID    insulin lispro  0-18 Units Subcutaneous Q4H    polyethylene glycol  17 g Per NG tube Daily    sodium chloride flush  5-40 mL Intravenous 2 times per day    chlorhexidine  15 mL Mouth/Throat

## 2021-06-13 NOTE — CARE COORDINATION
PER NURSE, PLAN IS COMPASSIONATE [facility] 6/14.  LSW/CM TO FOLLOW FOR POTENTIAL NEEDS,.

## 2021-06-13 NOTE — PROGRESS NOTES
Hospitalist Progress Note      Date of Admission: 6/2/2021  Chief Complaint:    Chief Complaint   Patient presents with    Shortness of Breath     Subjective:  intubated, sedated      Medications:    Infusion Medications    heparin (PORCINE) Infusion 18 Units/kg/hr (06/13/21 0545)    furosemide (LASIX) infusion 15 mg/hr (06/11/21 0842)    IV infusion builder 5 mL/hr at 06/12/21 1612    propofol 20 mcg/kg/min (06/13/21 0905)    sodium chloride      sodium chloride      dextrose       Scheduled Medications    insulin glargine  50 Units Subcutaneous BID    meropenem  500 mg Intravenous Q12H    famotidine (PEPCID) injection  20 mg Intravenous Daily    sodium chloride  500 mL Intravenous Once    aspirin  324 mg Oral Daily    isosorbide dinitrate  20 mg Oral TID    insulin lispro  0-18 Units Subcutaneous Q4H    polyethylene glycol  17 g Per NG tube Daily    sodium chloride flush  5-40 mL Intravenous 2 times per day    chlorhexidine  15 mL Mouth/Throat BID    rosuvastatin  10 mg Oral Nightly     PRN Meds: heparin (porcine), heparin (porcine), docusate, hydrALAZINE, sodium chloride flush, sodium chloride, sodium chloride, acetaminophen **OR** acetaminophen, ondansetron **OR** ondansetron, nitroGLYCERIN, potassium chloride **OR** potassium alternative oral replacement **OR** potassium chloride, magnesium sulfate, ipratropium-albuterol, glucose, dextrose, glucagon (rDNA), dextrose    Intake/Output Summary (Last 24 hours) at 6/13/2021 1229  Last data filed at 6/13/2021 0000  Gross per 24 hour   Intake 50 ml   Output    Net 50 ml     Exam:  BP (!) 114/45   Pulse 75   Temp 99.3 °F (37.4 °C) (Bladder)   Resp 25   Ht 6' 1\" (1.854 m)   Wt (!) 409 lb 9.8 oz (185.8 kg)   SpO2 (!) 89%   BMI 54.04 kg/m²   Head: Normocephalic, atraumatic  Sclera clear  Neck JVD flat  Lungs: scattered crackles    Labs:   Recent Labs     06/11/21  0555 06/12/21  0544 06/12/21  0916 06/13/21  0600   WBC 13.0* 13.3*  --  12.8* HGB 9.7* 9.3* 10.7* 9.8*   HCT 30.2* 29.6*  --  31.6*    376  --  472*     Recent Labs     06/11/21  0556 06/12/21  0544 06/12/21  0916 06/13/21  0600   * 132*  --  129*   K 4.2 4.8  --  4.6   CL 84* 83*  --  79*   CO2 34* 35*  --  34*   * 138*  --  159*   CREATININE 3.28* 4.33* 4.7* 4.97*   CALCIUM 9.0 9.3  --  9.6     Recent Labs     06/12/21  1551   INR 1.1     No results for input(s): CKTOTAL, TROPONINI in the last 72 hours. Radiology:  XR CHEST PORTABLE   Final Result   PULMONARY VASCULAR REMAINS CONGESTED. COULD JUST COMPONENT OF UNDERLYING CHF. CORRELATE WITH CLINICAL AND CLINICAL HISTORY MEDICINE   BIBASILAR PATCHY TO COALESCENT INFILTRATES IN BOTH BASES WITH BILATERAL PLEURAL EFFUSIONS         US RETROPERITONEAL LIMITED   Final Result      BILATERAL RENAL CYSTS. XR CHEST PORTABLE   Final Result   Patient remains intubated. The findings may reflect mild pulmonary edema. There are small bilateral pleural effusions. XR CHEST PORTABLE   Final Result      STABLE CHEST COMPARED TO 6/5/2021. XR CHEST PORTABLE   Final Result      STABLE CHEST COMPARED TO 6/4/2021. XR CHEST PORTABLE   Final Result      Interval improvement but persistence of bilateral pleural effusions and atelectasis and/or infiltrate. IR PICC WO SQ PORT/PUMP > 5 YEARS   Final Result      CTA Chest W WO  (PE study)   Final Result      Limited study as described. No CT evidence of pulmonary embolism within main, right, and left pulmonary arteries, and bilateral interlobar arteries. Segmental and subsegmental pulmonary arteries cannot be assessed. Small bilateral pleural effusions, right greater than left. Bilateral lower lobe collapse with bilateral subsegmental atelectasis/pneumonia, mid and upper lungs. Cardiomegaly. Bilateral thyroid nodules.  Nonemergent thyroid sonography may be obtained for further evaluation if clinical concern warrants

## 2021-06-13 NOTE — PROGRESS NOTES
Nephrology Progress Note    Assessment:  CHARLES on CKD-3  DM type-2    Respiratory Failure admission  Obesity ÓSCAR  Obesity       Plan: dialysis will not be considered by family at present    Patient Active Problem List:     Venous stasis ulcer of right lower leg with edema of right lower leg ([address]     Uncontrolled diabetes mellitus with complication, without long-term current use of insulin (HCC)     Morbid obesity (HCC)     Chronic kidney disease (CKD)     Non-pressure 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 layer exposed ([address]     Lymphedema of both lower extremities     Noncompliance with medication regimen     Shortness of breath     Bilateral carotid bruits     Dyslipidemia     Edema of right lower leg due to peripheral venous insufficiency     Acute respiratory failure with hypoxia (HCC)      Subjective:  Admit Date: 6/2/2021    Interval History:intubated and sedated    Medications:  Scheduled Meds:   piperacillin-tazobactam  3,375 mg Intravenous Q12H    insulin glargine  50 Units Subcutaneous BID    famotidine (PEPCID) injection  20 mg Intravenous Daily    sodium chloride  500 mL Intravenous Once    aspirin  324 mg Oral Daily    isosorbide dinitrate  20 mg Oral TID    insulin lispro  0-18 Units Subcutaneous Q4H    polyethylene glycol  17 g Per NG tube Daily    sodium chloride flush  5-40 mL Intravenous 2 times per day    chlorhexidine  15 mL Mouth/Throat BID    rosuvastatin  10 mg Oral Nightly     Continuous Infusions:   heparin (PORCINE) Infusion 18 Units/kg/hr (06/13/21 0545)    furosemide (LASIX) infusion 15 mg/hr (06/11/21 2352)    IV infusion builder 5 mL/hr at 06/12/21 1612    propofol 20 mcg/kg/min (06/13/21 0905)    sodium chloride      sodium chloride      dextrose         CBC:   Recent Labs     06/12/21  0544 06/12/21  0916 06/13/21  0600   WBC 13.3*  --  12.8*   HGB 9.3* 10.7* 9.8*     --  472*     CMP:    Recent Labs     06/11/21  0556 06/12/21  0544 06/12/21  0916 06/13/21 0600   * 132*  --  129*   K 4.2 4.8  --  4.6   CL 84* 83*  --  79*   CO2 34* 35*  --  34*   * 138*  --  159*   CREATININE 3.28* 4.33* 4.7* 4.97*   GLUCOSE 238* 229*  --  239*   CALCIUM 9.0 9.3  --  9.6   LABGLOM 18.6* 13.5* 12* 11.5*     Troponin: No results for input(s): TROPONINI in the last 72 hours. BNP: No results for input(s): BNP in the last 72 hours. INR:   Recent Labs     06/12/21  1551   INR 1.1     Lipids: No results for input(s): CHOL, LDLDIRECT, TRIG, HDL, AMYLASE, LIPASE in the last 72 hours. Liver: No results for input(s): AST, ALT, ALKPHOS, PROT, LABALBU, BILITOT in the last 72 hours. Invalid input(s): BILDIR  Iron:  No results for input(s): IRONS, FERRITIN in the last 72 hours. Invalid input(s): LABIRONS  Urinalysis: No results for input(s): UA in the last 72 hours.     Objective:  Vitals: BP (!) 114/45   Pulse 75   Temp 99.3 °F (37.4 °C) (Bladder)   Resp 25   Ht 6' 1\" (1.854 m)   Wt (!) 409 lb 9.8 oz (185.8 kg)   SpO2 (!) 89%   BMI 54.04 kg/m²    Wt Readings from Last 3 Encounters:   06/12/21 (!) 409 lb 9.8 oz (185.8 kg)   11/12/20 (!) 338 lb (153.3 kg)   11/03/20 (!) 328 lb (148.8 kg)      24HR INTAKE/OUTPUT:      Intake/Output Summary (Last 24 hours) at 6/13/2021 1041  Last data filed at 6/13/2021 0000  Gross per 24 hour   Intake 50 ml   Output    Net 50 ml       General: not alert, in no apparent distress  HEENT: normocephalic, atraumatic, anicteric  Neck: supple, no mass  intubated  Lungs: non-labored respirations, clear to auscultation bilaterally  Heart: regular rate and rhythm, no murmurs or rubs  Abdomen: soft, non-tender, non-distended obese  Ext: no cyanosis, no peripheral edema  Neuro: alert and oriented, no gross abnormalities  Psych: normal mood and affect  Skin: no rash      Electronically signed by Vidal Cooks, DO, MD

## 2021-06-13 NOTE — PROGRESS NOTES
Renal Adjustment Per Protocol: Zosyn 3.375 gm IV every 8 hours changed to Zosyn 3.375 mg IV q 12 hours based on continued decline in renal function  Recent Labs     06/13/21  0600   CREATININE 4.97*   Estimated Creatinine Clearance: 23 mL/min (A) (based on SCr of 4.97 mg/dL (H)). Sharlene Dunne

## 2021-06-14 NOTE — PROGRESS NOTES
Progress Note  Patient: Abby Hammans  Unit/Bed: MU54/BZ44-01  YOB: 1950  MRN: [id number]  Acct: [de-identified]   Admitting Diagnosis: Acute respiratory failure with hypoxia [facility]) [J96.01]  Admit Date:  6/2/2021  Hospital Day: 12    Chief Complaint:  Resp failure     Histories:  Past Medical History:   Diagnosis Date    Hyperlipidemia     Type II or unspecified type diabetes mellitus without mention of complication, not stated as uncontrolled      Past Surgical History:   Procedure Laterality Date    CYSTOSCOPY  04/19/2017    FLEXIBLE W/COMPLEX CYSTOMETROGRAMEMG ANAL-COMPLEX UROFLOW-US PROSTATE    SHOULDER SURGERY      WRIST FUSION       Family History   Problem Relation Age of Onset    Prostate Cancer Father     Cancer Sister      Social History     Socioeconomic History    Marital status:      Spouse name: None    Number of children: None    Years of education: None    Highest education level: None   Occupational History    None   Tobacco Use    Smoking status: Former Smoker    Smokeless tobacco: Never Used   Substance and Sexual Activity    Alcohol use: Not Currently    Drug use: Never    Sexual activity: Not Currently   Other Topics Concern    None   Social History Narrative    None     Social Determinants of Health     Financial Resource Strain:     Difficulty of Paying Living Expenses:    Food Insecurity:     Worried About Running Out of Food in the Last Year:     Ran Out of Food in the Last Year:    Transportation Needs:     Lack of Transportation (Medical):      Lack of Transportation (Non-Medical):    Physical Activity:     Days of Exercise per Week:     Minutes of Exercise per Session:    Stress:     Feeling of Stress :    Social Connections:     Frequency of Communication with Friends and Family:     Frequency of Social Gatherings with Friends and Family:     Attends Nondenominational Services:     Active Member of Clubs or Organizations:     Attends Club or Organization Meetings:     Marital Status:    Intimate Partner Violence:     Fear of Current or Ex-Partner:     Emotionally Abused:     Physically Abused:     Sexually Abused:        Subjective/HPI   Diuresuing well but net negative balance remains minimum. No progress in terms of weaning. EKG:  SR 98 but having frequent PAF. Rate is controlled. Review of Systems:   Review of Systems  vented    Physical Examination:    BP (!) 115/56   Pulse 72   Temp 98.9 °F (37.2 °C) (Oral)   Resp 22   Ht 6' 1\" (1.854 m)   Wt (!) 409 lb 9.8 oz (185.8 kg)   SpO2 97%   BMI 54.04 kg/m²    Physical Exam   Constitutional: No distress. He appears acutely ill. HENT:   Normal cephalic and Atraumatic   Eyes: Pupils are equal, round, and reactive to light. Neck: Thyroid normal. No JVD present. No neck adenopathy. No thyromegaly present. Cardiovascular: Intact distal pulses and normal pulses. An irregularly irregular rhythm present. Murmur heard. Pulmonary/Chest: Effort normal and breath sounds normal. He has no wheezes. He has no rales. He exhibits no tenderness. Abdominal: Soft. Bowel sounds are normal. There is no abdominal tenderness. Musculoskeletal:         General: Edema present. No tenderness. Normal range of motion. Cervical back: Normal range of motion and neck supple. Neurological: He exhibits altered mental status. Skin: Skin is warm. No cyanosis. Nails show no clubbing.        LABS:  CBC:   Lab Results   Component Value Date    WBC 13.4 06/14/2021    RBC 3.87 06/14/2021    HGB 9.1 06/14/2021    HCT 28.9 06/14/2021    MCV 74.6 06/14/2021    MCH 23.6 06/14/2021    MCHC 31.6 06/14/2021    RDW 17.6 06/14/2021     06/14/2021     CBC with Differential:    Lab Results   Component Value Date    WBC 13.4 06/14/2021    RBC 3.87 06/14/2021    HGB 9.1 06/14/2021    HCT 28.9 06/14/2021     06/14/2021    MCV 74.6 06/14/2021    MCH 23.6 06/14/2021    MCHC 31.6 06/14/2021    RDW 17.6 06/14/2021    BANDSPCT 2 06/10/2021    METASPCT 1 06/10/2021    LYMPHOPCT 9.4 06/14/2021    MONOPCT 10.8 06/14/2021    BASOPCT 0.5 06/14/2021    MONOSABS 1.4 06/14/2021    LYMPHSABS 1.3 06/14/2021    EOSABS 0.3 06/14/2021    BASOSABS 0.1 06/14/2021     CMP:    Lab Results   Component Value Date     06/14/2021    K 4.8 06/14/2021    CL 79 06/14/2021    CO2 34 06/14/2021     06/14/2021    CREATININE 4.84 06/14/2021    GFRAA 14.4 06/14/2021    LABGLOM 11.9 06/14/2021    GLUCOSE 223 06/14/2021    PROT 7.6 06/02/2021    LABALBU 4.0 06/02/2021    CALCIUM 9.5 06/14/2021    BILITOT 0.3 06/02/2021    ALKPHOS 77 06/02/2021    AST 24 06/02/2021    ALT 27 06/02/2021     BMP:    Lab Results   Component Value Date     06/14/2021    K 4.8 06/14/2021    CL 79 06/14/2021    CO2 34 06/14/2021     06/14/2021    LABALBU 4.0 06/02/2021    CREATININE 4.84 06/14/2021    CALCIUM 9.5 06/14/2021    GFRAA 14.4 06/14/2021    LABGLOM 11.9 06/14/2021    GLUCOSE 223 06/14/2021     Magnesium:    Lab Results   Component Value Date    MG 2.1 06/03/2021     Troponin:    Lab Results   Component Value Date    TROPONINI 0.036 06/03/2021        Active Hospital Problems    Diagnosis Date Noted    Acute respiratory failure with hypoxia ([facility] [address] [J96.01] 06/02/2021     Priority: Low        Assessment/Plan:  1. Hypoxic Respiratory failure - vented  2. Volume overloaded -  acute on Chronic DHF -on Lasix gtt and Metolazone. Minimize/concentrate pt's drips etc to attain negative fluid balance. disccussed with pharmacy. 3. HTN- added Isosorbide Dinitrate and Hydralazine per OG - for Afterload reduction. 4. Demand ischemia - ASA. No BB due to Bradycardia. 5. Echo EF 55 RVSP 32   6. Likely ÓSCAR   7. Remains critically ill. 8. Discussed with wife and Dr. Salomón Madison-  Pt would not have wanted heroic measures to include life sustaining measures such as HD, CPR or intubation.  At this point we will continue supportive care but no further escalation such as CPR and certainly no Tracheostomy. 5. Wife has decided on terminal wean.           Electronically signed by Sophia Perera MD on 6/14/2021 at 11:12 AM

## 2021-06-14 NOTE — PROGRESS NOTES
Nephrology Progress Note    Assessment:  CHARLES non oilguric stable  Respiratory failure  DM type-2  Obesity      Plan: no absolute need for dialysis  And family deferring treatmnet    Patient Active Problem List:     Venous stasis ulcer of right lower leg with edema of right lower leg ([address]     Uncontrolled diabetes mellitus with complication, without long-term current use of insulin (HCC)     Morbid obesity (HCC)     Chronic kidney disease (CKD)     Non-pressure 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 layer exposed ([address]     Lymphedema of both lower extremities     Noncompliance with medication regimen     Shortness of breath     Bilateral carotid bruits     Dyslipidemia     Edema of right lower leg due to peripheral venous insufficiency     Acute respiratory failure with hypoxia (HCC)      Subjective:  Admit Date: 6/2/2021    Interval History: sedated on ventilator    Medications:  Scheduled Meds:   insulin glargine  50 Units Subcutaneous BID    meropenem  500 mg Intravenous Q12H    famotidine (PEPCID) injection  20 mg Intravenous Daily    sodium chloride  500 mL Intravenous Once    aspirin  324 mg Oral Daily    isosorbide dinitrate  20 mg Oral TID    insulin lispro  0-18 Units Subcutaneous Q4H    polyethylene glycol  17 g Per NG tube Daily    sodium chloride flush  5-40 mL Intravenous 2 times per day    chlorhexidine  15 mL Mouth/Throat BID    rosuvastatin  10 mg Oral Nightly     Continuous Infusions:   heparin (PORCINE) Infusion 17 Units/kg/hr (06/14/21 0605)    furosemide (LASIX) infusion 15 mg/hr (06/13/21 1232)    IV infusion builder 5 mL/hr at 06/12/21 1612    propofol 20 mcg/kg/min (06/14/21 0646)    sodium chloride      sodium chloride      dextrose         CBC:   Recent Labs     06/13/21  0600 06/14/21  0526   WBC 12.8* 13.4*   HGB 9.8* 9.1*   * 500*     CMP:    Recent Labs     06/12/21  0544 06/12/21  0916 06/13/21  0600 06/14/21  0524   *  --  129* 128*   K 4.8  --  4.6 4.8 CL 83*  --  79* 79*   CO2 35*  --  34* 34*   *  --  159* 169*   CREATININE 4.33* 4.7* 4.97* 4.84*   GLUCOSE 229*  --  239* 223*   CALCIUM 9.3  --  9.6 9.5   LABGLOM 13.5* 12* 11.5* 11.9*     Troponin: No results for input(s): TROPONINI in the last 72 hours. BNP: No results for input(s): BNP in the last 72 hours. INR:   Recent Labs     06/12/21  1551   INR 1.1     Lipids: No results for input(s): CHOL, LDLDIRECT, TRIG, HDL, AMYLASE, LIPASE in the last 72 hours. Liver: No results for input(s): AST, ALT, ALKPHOS, PROT, LABALBU, BILITOT in the last 72 hours. Invalid input(s): BILDIR  Iron:  No results for input(s): IRONS, FERRITIN in the last 72 hours. Invalid input(s): LABIRONS  Urinalysis: No results for input(s): UA in the last 72 hours.     Objective:  Vitals: BP (!) 105/42   Pulse 58   Temp 98.2 °F (36.8 °C) (Rectal)   Resp 22   Ht 6' 1\" (1.854 m)   Wt (!) 409 lb 9.8 oz (185.8 kg)   SpO2 93%   BMI 54.04 kg/m²    Wt Readings from Last 3 Encounters:   06/12/21 (!) 409 lb 9.8 oz (185.8 kg)   11/12/20 (!) 338 lb (153.3 kg)   11/03/20 (!) 328 lb (148.8 kg)      24HR INTAKE/OUTPUT:      Intake/Output Summary (Last 24 hours) at 6/14/2021 0845  Last data filed at 6/14/2021 4715  Gross per 24 hour   Intake 2887.07 ml   Output 1450 ml   Net 1437.07 ml       General: not alert, in no apparent distress  ET in place  HEENT: normocephalic, atraumatic, anicteric  Neck: supple, no mass  Lungs: non-labored respirations, clear to auscultation bilaterally  Heart: regular rate and rhythm, no murmurs or rubs  Abdomen: soft, non-tender, non-distended obese  Ext: no cyanosis, no peripheral edema  Neuro: alert and oriented, no gross abnormalities  Psych: normal mood and affect  Skin: no rash      Electronically signed by Rocio Nunez DO, MD

## 2021-06-14 NOTE — DISCHARGE SUMMARY
[facility] Medicine Discharge Summary    Naveen Piedra  :  1950  MRN:  [id number]    Admit date:  2021  Discharge date:  2021    Admitting Physician:  No admitting provider for patient encounter. Primary Care Physician:  Crow Mckeon DO    Discharge Diagnoses: Active Problems:    Acute respiratory failure with hypoxia (HCC)  Resolved Problems:    * No resolved hospital problems. *    Chief Complaint   Patient presents with    Shortness of Breath       Condition:  worse       Significant Findings:     CTA Chest:  No CT evidence of pulmonary embolism within main, right, and left pulmonary arteries, and bilateral interlobar arteries. Segmental and subsegmental pulmonary arteries cannot be assessed.       Small bilateral pleural effusions, right greater than left.       Bilateral lower lobe collapse with bilateral subsegmental atelectasis/pneumonia, mid and upper lungs.       Cardiomegaly. Hospital Course:   68-year-old male with class III obesity presented with dyspnea and was found to have acute on chronic hypoxic and hypercapnic respiratory failure requiring ventilation due to atelectasis, volume overload, and ESBL pneumonia. He unfortunately failed to improve and family decided to transition to comfort care and withdrew care on .      DC time 37 minutes    Signed:  Christianne Sandhoff, DO  2021, 3:25 PM

## 2021-06-14 NOTE — PROGRESS NOTES
Progress Note  Patient: Montez Valle  Unit/Bed: RY98/HX67-51  YOB: 1950  MRN: [id number]  Acct: [de-identified]   Admitting Diagnosis: Acute respiratory failure with hypoxia [facility]) [J96.01]  Admit Date:  6/2/2021  Hospital Day: 11    Chief Complaint:  Resp failure     Subjective: Patient is on ventilator and sedated. On Lasix drip. Currently in A. fib heart rate of 70 bpm.  Family to make a decision soon regarding CODE STATUS/limitation of care. 6/13/2021: Patient remains on vent, sedated. A. fib on telemetry with heart rate of 72 bpm on Lasix as well as heparin drip.    +3.7 total net fluid balance    Histories:  Past Medical History:   Diagnosis Date    Hyperlipidemia     Type II or unspecified type diabetes mellitus without mention of complication, not stated as uncontrolled      Past Surgical History:   Procedure Laterality Date    CYSTOSCOPY  04/19/2017    FLEXIBLE W/COMPLEX CYSTOMETROGRAMEMG ANAL-COMPLEX UROFLOW-US PROSTATE    SHOULDER SURGERY      WRIST FUSION       Family History   Problem Relation Age of Onset    Prostate Cancer Father     Cancer Sister      Social History     Socioeconomic History    Marital status:      Spouse name: None    Number of children: None    Years of education: None    Highest education level: None   Occupational History    None   Tobacco Use    Smoking status: Former Smoker    Smokeless tobacco: Never Used   Substance and Sexual Activity    Alcohol use: Not Currently    Drug use: Never    Sexual activity: Not Currently   Other Topics Concern    None   Social History Narrative    None     Social Determinants of Health     Financial Resource Strain:     Difficulty of Paying Living Expenses:    Food Insecurity:     Worried About Running Out of Food in the Last Year:     Ran Out of Food in the Last Year:    Transportation Needs:     Lack of Transportation (Medical):      Lack of Transportation (Non-Medical):    Physical Activity:     Days of Exercise per Week:     Minutes of Exercise per Session:    Stress:     Feeling of Stress :    Social Connections:     Frequency of Communication with Friends and Family:     Frequency of Social Gatherings with Friends and Family:     Attends Islam Services:     Active Member of Clubs or Organizations:     Attends Club or Organization Meetings:     Marital Status:    Intimate Partner Violence:     Fear of Current or Ex-Partner:     Emotionally Abused:     Physically Abused:     Sexually Abused:          Review of Systems:   Review of Systems  vented    Physical Examination:    BP (!) 96/56   Pulse 70   Temp 98.4 °F (36.9 °C) (Rectal)   Resp 22   Ht 6' 1\" (1.854 m)   Wt (!) 409 lb 9.8 oz (185.8 kg)   SpO2 94%   BMI 54.04 kg/m²    Physical Exam   Constitutional: No distress. He appears acutely ill. HENT:   Normal cephalic and Atraumatic   Eyes: Pupils are equal, round, and reactive to light. Neck: Thyroid normal. No JVD present. No neck adenopathy. No thyromegaly present. Cardiovascular: Intact distal pulses and normal pulses. An irregularly irregular rhythm present. Murmur heard. Pulmonary/Chest: Effort normal and breath sounds normal. He has no wheezes. He has no rales. He exhibits no tenderness. Abdominal: Soft. Bowel sounds are normal. There is no abdominal tenderness. Musculoskeletal:         General: Edema present. No tenderness. Normal range of motion. Cervical back: Normal range of motion and neck supple. Neurological: He exhibits altered mental status. Skin: Skin is warm. No cyanosis. Nails show no clubbing.        LABS:  CBC:   Lab Results   Component Value Date    WBC 12.8 06/13/2021    RBC 4.24 06/13/2021    HGB 9.8 06/13/2021    HCT 31.6 06/13/2021    MCV 74.6 06/13/2021    MCH 23.0 06/13/2021    MCHC 30.8 06/13/2021    RDW 17.7 06/13/2021     06/13/2021     CBC with Differential:    Lab Results   Component Value Date    WBC 12.8 06/13/2021    RBC 4.24 06/13/2021    HGB 9.8 06/13/2021    HCT 31.6 06/13/2021     06/13/2021    MCV 74.6 06/13/2021    MCH 23.0 06/13/2021    MCHC 30.8 06/13/2021    RDW 17.7 06/13/2021    BANDSPCT 2 06/10/2021    METASPCT 1 06/10/2021    LYMPHOPCT 14.3 06/13/2021    MONOPCT 9.5 06/13/2021    BASOPCT 0.7 06/13/2021    MONOSABS 1.2 06/13/2021    LYMPHSABS 1.8 06/13/2021    EOSABS 0.4 06/13/2021    BASOSABS 0.1 06/13/2021     CMP:    Lab Results   Component Value Date     06/13/2021    K 4.6 06/13/2021    CL 79 06/13/2021    CO2 34 06/13/2021     06/13/2021    CREATININE 4.97 06/13/2021    GFRAA 14.0 06/13/2021    LABGLOM 11.5 06/13/2021    GLUCOSE 239 06/13/2021    PROT 7.6 06/02/2021    LABALBU 4.0 06/02/2021    CALCIUM 9.6 06/13/2021    BILITOT 0.3 06/02/2021    ALKPHOS 77 06/02/2021    AST 24 06/02/2021    ALT 27 06/02/2021     BMP:    Lab Results   Component Value Date     06/13/2021    K 4.6 06/13/2021    CL 79 06/13/2021    CO2 34 06/13/2021     06/13/2021    LABALBU 4.0 06/02/2021    CREATININE 4.97 06/13/2021    CALCIUM 9.6 06/13/2021    GFRAA 14.0 06/13/2021    LABGLOM 11.5 06/13/2021    GLUCOSE 239 06/13/2021     Magnesium:    Lab Results   Component Value Date    MG 2.1 06/03/2021     Troponin:    Lab Results   Component Value Date    TROPONINI 0.036 06/03/2021        Active Hospital Problems    Diagnosis Date Noted    Acute respiratory failure with hypoxia ([address] [J96.01] 06/02/2021     Priority: Low        Assessment/Plan:  1. Vent dependent respiratory failureintensivist/pulmonary recommendation  2. Volume overloaded -  acute on Chronic DHF -a Lasix gtt and Metolazone. Nephrology recommendation  3. Paroxysmal atrial fibrillationcontrolled ventricular response. -Heparin drip   4. HTN-maximize cardiac medications  5. Demand ischemia - ASA. No BB due to Bradycardia. 6. Echo EF 55 RVSP 32   7. Likely ÓSCAR   8.  Per records pt would not have wanted heroic measures to include life

## 2021-06-14 NOTE — CARE COORDINATION
ICU team quality rounds done this am. Patients wife at bedside. I met with her re : hospice referral and she states that she would meet with [facility] if patient did survive the compassion wean at 1pm.She has all her children and his sister coming and she does not want to meet with hospice prior to the wean. I did speak to Malaika Coombs at [facility] and she also called her to set meeting and she told Malaika Coombs if she needed the Hospice because he survives past the wean she would meet with them then.

## 2021-06-14 NOTE — FLOWSHEET NOTE
0900- wife at bedside. Plan to compassionate wean at 1 pm.    1345- patient extubated family at bedside patient on 2 liters nc    1350- gave 4mg of ivp morphine for comfort    1356- patient asystole. Verified by 2 RN.     1515- patient leaving the unit to the [facility]

## 2021-06-14 NOTE — PROGRESS NOTES
Spiritual Care Services     Summary of Visit:  Patient  after compassionate wean. Wife and family at bedside with patient and they were tearful but grieving in a healthy manner.  provided prayer and grief care to patient's family.  also helped family with  plans and helped family start the grieving process,. Spiritual Assessment/Intervention/Outcomes:    Encounter Summary  Services provided to[de-identified] Patient and family together  Referral/Consult From[de-identified] Nurse  Support System: Spouse, Children, Family members  Continue Visiting: No  Complexity of Encounter: High  Length of Encounter: 1 hour  Spiritual Assessment Completed: Yes  Routine  Type: Follow up  Assessment: Unable to respond (vent, but family present)  Intervention: Explored feelings, thoughts, concerns, Explored coping resources, Sustaining presence/ Ministry of presence  Outcome: Receptive     Spiritual/Lutheran  Type: Spiritual support  Assessment: Tearful, Grieving, Anticipatory grief  Intervention: Ritual, Contacted support as requested per patient/family request  Who?:   Why?: End of life care, sacraments  At Request Of: Patient's spouse  Outcome: Connection/belonging, Expressed gratitude, Coping, Grieving  Sacraments  Sacrament of Sick-Anointing: Anointed  Grief and Life Adjustment  Type: Death  Assessment: Grieving, Tearful  Intervention: Prayer, Ritual, Sustaining presence/ Ministry of presence, Grief care  Outcome: Comfort, Coping, Tearful, Grieving              Values / Beliefs  Do you have any ethnic, cultural, sacramental, or spiritual Yazidism needs you would like us to be aware of while you are in the hospital?: No    Care Plan:        [id number] [address]   Electronically signed by Lei Carranza on 21 at 2:51 PM EDT     To reach a  for emotional and spiritual support, place an [facility] consult request.   If a  is needed immediately, dial 0 and ask to page the on-call .

## 2021-06-14 NOTE — PROGRESS NOTES
Pulmonary & Critical Care Medicine ICU Progress Note  Chief complaint : Respiratory failure    Subjunctive/24 hour events :   Patient seen and examined during multidisciplinary rounds with RN, charge nurse, RT, pharmacy, dietitian, and social service. Patient on vent, unresponsive, continues to require 100% FiO2, no fever overnight, currently on heparin drip and Lasix drip, he is on propofol for sedation, urine output 1400 cc,     Social History     Tobacco Use    Smoking status: Former Smoker    Smokeless tobacco: Never Used   Substance Use Topics    Alcohol use: Not Currently         Problem Relation Age of Onset    Prostate Cancer Father     Cancer Sister        Recent Labs     06/11/21  0847 06/12/21  0916   PHART 7.442 7.417   VRA0LIV 53* 54*   PO2ART 60* 60*       MV Settings:  Vent Mode: AC/VC Rate Set: 18 bmp/Vt Ordered: 450 mL/ /FiO2 : 100 %           IV:   heparin (PORCINE) Infusion 17 Units/kg/hr (06/14/21 0605)    furosemide (LASIX) infusion 15 mg/hr (06/13/21 1232)    IV infusion builder 5 mL/hr at 06/12/21 1612    propofol 20 mcg/kg/min (06/14/21 0646)    sodium chloride      sodium chloride      dextrose         Vitals:  BP (!) 105/42   Pulse 58   Temp 98.2 °F (36.8 °C) (Rectal)   Resp 22   Ht 6' 1\" (1.854 m)   Wt (!) 409 lb 9.8 oz (185.8 kg)   SpO2 93%   BMI 54.04 kg/m²    Tmax:        Intake/Output Summary (Last 24 hours) at 6/14/2021 0844  Last data filed at 6/14/2021 0512  Gross per 24 hour   Intake 2887.07 ml   Output 1450 ml   Net 1437.07 ml       EXAM:  General: On vent, sedated, unresponsive  Head: normocephalic, atraumatic  Eyes:No gross abnormalities. ENT:  MMM no lesions, etiology to pain  Neck:  supple and no masses  Chest : Vent sounds, good air movement, bibasilar rales, no wheezing, nontender, tympanic  Heart[de-identified] Heart sounds are normal.  Regular rate and rhythm without murmur, gallop or rub.   ABD:  symmetric, soft, non-tender, no guarding or rebound, hydrocele Musculoskeletal : no cyanosis, no clubbing and 1 + edema-  bilateral lower extremities  Neuro:  Sedated, unresponsive  Skin: No rashes or nodules noted. Lymph node:  no cervical nodes  Urology: Yes Carreno   Psychiatric: unresponsive    Medications:  Scheduled Meds:   insulin glargine  50 Units Subcutaneous BID    meropenem  500 mg Intravenous Q12H    famotidine (PEPCID) injection  20 mg Intravenous Daily    sodium chloride  500 mL Intravenous Once    aspirin  324 mg Oral Daily    isosorbide dinitrate  20 mg Oral TID    insulin lispro  0-18 Units Subcutaneous Q4H    polyethylene glycol  17 g Per NG tube Daily    sodium chloride flush  5-40 mL Intravenous 2 times per day    chlorhexidine  15 mL Mouth/Throat BID    rosuvastatin  10 mg Oral Nightly       PRN Meds:  heparin (porcine), heparin (porcine), docusate, hydrALAZINE, sodium chloride flush, sodium chloride, sodium chloride, acetaminophen **OR** acetaminophen, ondansetron **OR** ondansetron, nitroGLYCERIN, potassium chloride **OR** potassium alternative oral replacement **OR** potassium chloride, magnesium sulfate, ipratropium-albuterol, glucose, dextrose, glucagon (rDNA), dextrose    Results: reviewed by me   CBC:   Recent Labs     06/12/21  0544 06/12/21  0916 06/13/21  0600 06/14/21  0526   WBC 13.3*  --  12.8* 13.4*   HGB 9.3* 10.7* 9.8* 9.1*   HCT 29.6*  --  31.6* 28.9*   MCV 74.7*  --  74.6* 74.6*     --  472* 500*     BMP:   Recent Labs     06/12/21  0544 06/12/21  0916 06/13/21  0600 06/14/21  0524   *  --  129* 128*   K 4.8  --  4.6 4.8   CL 83*  --  79* 79*   CO2 35*  --  34* 34*   *  --  159* 169*   CREATININE 4.33* 4.7* 4.97* 4.84*     LIVER PROFILE: No results for input(s): AST, ALT, LIPASE, BILIDIR, BILITOT, ALKPHOS in the last 72 hours. Invalid input(s):   AMYLASE,  ALB  PT/INR:   Recent Labs     06/12/21  1551   PROTIME 14.0   INR 1.1     APTT:   Recent Labs     06/12/21  1551   APTT 30.0     UA:No results for input(s): NITRITE, COLORU, PHUR, LABCAST, WBCUA, RBCUA, MUCUS, TRICHOMONAS, YEAST, BACTERIA, CLARITYU, SPECGRAV, LEUKOCYTESUR, UROBILINOGEN, BILIRUBINUR, BLOODU, GLUCOSEU, AMORPHOUS in the last 72 hours. Invalid input(s): KETONESU    Cultures:  ESBL in the sputum  Films:  CXR reviewed by me and it showed congested, no infiltrate      Assessment: This is a critically ill patient at risk of deterioration / death , needing close ICU monitoring and intervention due to below noted problems   · Acute on chronic hypoxic and hypercapnic respiratory failure  · ESBL pneumonia  · Volume overload  · Acute kidney injury with underlying chronic kidney disease  · Obesity  · Hyperglycemia      Recommendations  · Vent support lung protective strategy  · head of the bed 30°  · Breathing trial when patient lung mechanics improved  · Sedation holiday when patient lung mechanics improved  · Sedation with  propofol  target R ASS of 0 to -1  · Watch for ICU delirium: TV on, natural light, avoid benzos, pain control, early mobility, and family engagement  · PUD prophylaxis  · DVT prophylaxis   · Monitor blood sugar target 140180  · Target negative balance, appreciate nephrology  · Continue tube feed    Prognosis is poor, discussed with patient wife, she is considering comfort care later today after family visits, will consult hospice. Due to the immediate potential for life-threatening deterioration due to acute respiratory failure I spent 32  minutes providing critical care.  This time is excluding time spent performing procedures.           Electronically signed by Angelina Martin MD,  [facility] ,on 6/14/2021 at 8:44 AM

## 2021-06-14 NOTE — PROGRESS NOTES
Spiritual Care Services     Summary of Visit:  Patient to be compassionately weaned today. Patient's wife asked for a visit from a  before the scheduled wean.  visited patient and provided the sacraments     Spiritual Assessment/Intervention/Outcomes:    Encounter Summary  Services provided to[de-identified] Patient  Referral/Consult From[de-identified] Family  Support System: Spouse, Family members  Continue Visiting: Yes  Complexity of Encounter: High  Length of Encounter: 15 minutes  Routine  Type: Follow up  Assessment: Unable to respond (vent, but family present)  Intervention: Explored feelings, thoughts, concerns, Explored coping resources, Sustaining presence/ Ministry of presence  Outcome: Receptive     Spiritual/Muslim  Type: Spiritual support  Assessment: Tearful, Grieving, Anticipatory grief  Intervention: Ritual, Contacted support as requested per patient/family request  Who?:   Why?: End of life care, sacraments  At Request Of: Patient's spouse  Outcome: Connection/belonging, Expressed gratitude, Coping, Grieving  Sacraments  Sacrament of Sick-Anointing: Anointed                 Values / Beliefs  Do you have any ethnic, cultural, sacramental, or spiritual Anabaptist needs you would like us to be aware of while you are in the hospital?: No    Care Plan:        [id number] [address]   Electronically signed by Lei Carranza on 6/14/21 at 1:09 PM EDT     To reach a  for emotional and spiritual support, place an [facility] consult request.   If a  is needed immediately, dial 0 and ask to page the on-call .

## 2021-06-15 LAB
BLOOD CULTURE, ROUTINE: NORMAL
CULTURE, BLOOD 2: NORMAL

## 2021-06-18 NOTE — PROGRESS NOTES
Physician Progress Note      PATIENT:               Graham Porter  CSN #:                  [id number]  :                       1950  ADMIT DATE:       2021 11:53 AM  DISCH DATE:        2021 2:59 PM  RESPONDING  PROVIDER #:        Gisell Jeffries DO          QUERY TEXT:    Patient admitted with acute respiratory failure and multilobar and aspiration   pneumonia. On admit WBC 14.7, procalcitonin 0.19, intubation in ED, confusion. Query response \"Possible sepsis with lung as source as detailed in pulm/cc   note\"  per Dr Jose Deleon. If possible, please document in the progress notes and   discharge summary if Sepsis was: The medical record reflects the following:  Risk Factors: DMII CKD 3, bmi 54/morbid obesity,  Clinical Indicators: multilobar pna and aspiration pna per pulmonology, acute   respiratory failure, kerry, acute on chronic CHF, demand ischemia, mortality. Treatment: Zosyn, IVF, Lasix drip, Vent > 96 hours,  after admission merrem,   vanco, hospice referral and possible compassionate wean planned    Thank you  Yamileth Alfaro RN, BSN, CCDS, CDI  [email or web address]  Options provided:  -- Possible Sepsis POA confirmed after study  -- Possible Sepsis POA treated and resolved  -- Possible Sepsis POA ruled out after study  -- Other - I will add my own diagnosis  -- Disagree - Not applicable / Not valid  -- Disagree - Clinically unable to determine / Unknown  -- Refer to Clinical Documentation Reviewer    PROVIDER RESPONSE TEXT:    Possible Sepsis POA confirmed after study.     Query created by: Manda Beasley on 2021 2:25 PM      Electronically signed by:  Gisell Jeffries DO 2021 3:25 PM

## 2022-04-18 NOTE — H&P
lower lobe collapse. Subsegmental consolidation right middle and upper lobes. . Left lung: No nodules or masses. Small left pleural effusion. Left lower lobe collapse. Subsegmental consolidation lingula, and left upper lobe. Lymph nodes/mediastinum: No hilar, mediastinal, or axillary lymph node enlargement. Tip of endotracheal tube, in caudal trachea. Bilateral thyroid nodules, measuring up to 6 mm. Upper abdomen:Limited imaging upper abdomen shows tip of nasogastric tube within gastric body. Musculoskeletal:No osteoblastic, and no osteolytic lesions. Limited study as described. No CT evidence of pulmonary embolism within main, right, and left pulmonary arteries, and bilateral interlobar arteries. Segmental and subsegmental pulmonary arteries cannot be assessed. Small bilateral pleural effusions, right greater than left. Bilateral lower lobe collapse with bilateral subsegmental atelectasis/pneumonia, mid and upper lungs. Cardiomegaly. Bilateral thyroid nodules. Nonemergent thyroid sonography may be obtained for further evaluation if clinical concern warrants to rule out malignancy. All CT scans at this facility use dose modulation, iterative reconstruction, and/or weight based dosing when appropriate to reduce radiation dose to as low as reasonably achievable. XR CHEST PORTABLE    Result Date: 6/2/2021  Exam: XR CHEST PORTABLE History:  intubation Technique: AP portable view of the chest obtained. Comparison: none Chest x-ray portable Findings: The patient is intubated, there is an NG tube coursing towards the stomach  The cardiac silhouette is at the upper limits of normal in size which may be secondary to AP technique. There is no pneumothorax. There are increased interstitial pulmonary from both lungs with an area of consolidation in the left lung base and possible left pleural effusion. Bones of the thorax appear intact. Status post intubation and NG tube placement.  There is an area of consolidation in the left lung base. There is possible left pleural effusion. Assessment and Plan   1. Acute hypoxic respiratory failure secondary to bilateral lower lobe collapse; cannot rule out pneumonia:  Low suspicion for PNA but will use zosyn until seen by pulmonology/intensivist; vent management per intensivist  2. NSTEMI:  Patient with new LBBB and elevated troponin; therapeutic lovenox per cardiology recommendations  3. DMII:  Q6H SSI while NPO  4. CKD:  Renally dose meds  5. Functional Status: Fall precautions. Up with assistance. PT OT  6. Diet: NPO  7. DVT ppx: Lovenox SCDs  8. Disposition: Dependent on hospital course. Will discharge once medically stable. SW on board for discharge planning.      Patient Active Problem List   Diagnosis Code    Venous stasis ulcer of right lower leg with edema of right lower leg (HCC) I83.019, I83.891, L97.919, R60.9    Uncontrolled diabetes mellitus with complication, without long-term current use of insulin (HCC) E11.8, E11.65    Morbid obesity ([facility]) E66.01    Chronic kidney disease (CKD) N18.9    Non-pressure 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 layer exposed ([facility]) X20.851    Lymphedema of both lower extremities I89.0    Noncompliance with medication regimen Z91.14    Shortness of breath R06.02    Bilateral carotid bruits R09.89    Dyslipidemia E78.5    Edema of right lower leg due to peripheral venous insufficiency I87.2, R60.0    Acute respiratory failure with hypoxia ([facility]) J96.01     Hank Morris MD, MD  Admitting Hospitalist    Emergency Contact: Azelaic Acid Pregnancy And Lactation Text: This medication is considered safe during pregnancy and breast feeding.